# Patient Record
Sex: FEMALE | Race: WHITE | NOT HISPANIC OR LATINO
[De-identification: names, ages, dates, MRNs, and addresses within clinical notes are randomized per-mention and may not be internally consistent; named-entity substitution may affect disease eponyms.]

---

## 2017-01-23 ENCOUNTER — APPOINTMENT (OUTPATIENT)
Age: 52
End: 2017-01-23

## 2017-01-23 VITALS
BODY MASS INDEX: 19.67 KG/M2 | DIASTOLIC BLOOD PRESSURE: 73 MMHG | RESPIRATION RATE: 14 BRPM | HEIGHT: 63 IN | SYSTOLIC BLOOD PRESSURE: 118 MMHG | WEIGHT: 111 LBS | HEART RATE: 90 BPM | TEMPERATURE: 98.6 F

## 2017-01-23 DIAGNOSIS — K76.89 OTHER SPECIFIED DISEASES OF LIVER: ICD-10-CM

## 2017-01-23 LAB
BASOPHILS # BLD AUTO: 0.03 K/UL
BASOPHILS NFR BLD AUTO: 0.4 %
EOSINOPHIL # BLD AUTO: 0.02 K/UL
EOSINOPHIL NFR BLD AUTO: 0.3 %
HCT VFR BLD CALC: 40.6 %
HGB BLD-MCNC: 13.3 G/DL
IMM GRANULOCYTES NFR BLD AUTO: 0.3 %
LYMPHOCYTES # BLD AUTO: 1.38 K/UL
LYMPHOCYTES NFR BLD AUTO: 18.9 %
MAN DIFF?: NORMAL
MCHC RBC-ENTMCNC: 30.2 PG
MCHC RBC-ENTMCNC: 32.8 GM/DL
MCV RBC AUTO: 92.1 FL
MONOCYTES # BLD AUTO: 0.41 K/UL
MONOCYTES NFR BLD AUTO: 5.6 %
NEUTROPHILS # BLD AUTO: 5.43 K/UL
NEUTROPHILS NFR BLD AUTO: 74.5 %
PLATELET # BLD AUTO: 181 K/UL
RBC # BLD: 4.41 M/UL
RBC # FLD: 12.4 %
WBC # FLD AUTO: 7.29 K/UL

## 2017-01-24 LAB
ALBUMIN SERPL ELPH-MCNC: 4.3 G/DL
ALP BLD-CCNC: 49 U/L
ALT SERPL-CCNC: 5 U/L
ANION GAP SERPL CALC-SCNC: 13 MMOL/L
AST SERPL-CCNC: 14 U/L
BILIRUB SERPL-MCNC: 0.4 MG/DL
BUN SERPL-MCNC: 13 MG/DL
CALCIUM SERPL-MCNC: 8.9 MG/DL
CHLORIDE SERPL-SCNC: 102 MMOL/L
CO2 SERPL-SCNC: 26 MMOL/L
CREAT SERPL-MCNC: 0.63 MG/DL
GLUCOSE SERPL-MCNC: 87 MG/DL
HAV IGG+IGM SER QL: NONREACTIVE
HBV CORE IGG+IGM SER QL: NONREACTIVE
HBV SURFACE AB SER QL: NONREACTIVE
HBV SURFACE AG SER QL: NONREACTIVE
HCV AB SER QL: NONREACTIVE
HCV S/CO RATIO: 0.17 S/CO
POTASSIUM SERPL-SCNC: 3.7 MMOL/L
PROT SERPL-MCNC: 7 G/DL
SODIUM SERPL-SCNC: 141 MMOL/L

## 2017-01-25 ENCOUNTER — LABORATORY RESULT (OUTPATIENT)
Age: 52
End: 2017-01-25

## 2017-01-25 ENCOUNTER — APPOINTMENT (OUTPATIENT)
Dept: ENDOCRINOLOGY | Facility: CLINIC | Age: 52
End: 2017-01-25

## 2017-01-25 VITALS
BODY MASS INDEX: 18.61 KG/M2 | OXYGEN SATURATION: 98 % | SYSTOLIC BLOOD PRESSURE: 110 MMHG | HEIGHT: 63 IN | WEIGHT: 105 LBS | DIASTOLIC BLOOD PRESSURE: 60 MMHG | HEART RATE: 61 BPM

## 2017-01-26 LAB
T3RU NFR SERPL: 1.06 INDEX
T4 SERPL-MCNC: 6.7 UG/DL
TSH SERPL-ACNC: 0.82 UIU/ML

## 2017-01-27 LAB
ALBUMIN SERPL ELPH-MCNC: 4.5 G/DL
ALP BLD-CCNC: 57 U/L
ALT SERPL-CCNC: 8 U/L
ANION GAP SERPL CALC-SCNC: 18 MMOL/L
AST SERPL-CCNC: 16 U/L
BILIRUB SERPL-MCNC: 0.4 MG/DL
BUN SERPL-MCNC: 10 MG/DL
CALCIUM SERPL-MCNC: 9.6 MG/DL
CHLORIDE SERPL-SCNC: 103 MMOL/L
CO2 SERPL-SCNC: 24 MMOL/L
CREAT SERPL-MCNC: 0.72 MG/DL
GLUCOSE SERPL-MCNC: 82 MG/DL
POTASSIUM SERPL-SCNC: 4.2 MMOL/L
PROT SERPL-MCNC: 7.1 G/DL
SODIUM SERPL-SCNC: 145 MMOL/L

## 2017-02-27 ENCOUNTER — APPOINTMENT (OUTPATIENT)
Dept: GASTROENTEROLOGY | Facility: CLINIC | Age: 52
End: 2017-02-27

## 2017-02-27 VITALS
RESPIRATION RATE: 12 BRPM | DIASTOLIC BLOOD PRESSURE: 60 MMHG | HEIGHT: 63 IN | SYSTOLIC BLOOD PRESSURE: 127 MMHG | TEMPERATURE: 83 F | WEIGHT: 105 LBS | BODY MASS INDEX: 18.61 KG/M2

## 2017-03-02 ENCOUNTER — APPOINTMENT (OUTPATIENT)
Dept: GASTROENTEROLOGY | Facility: CLINIC | Age: 52
End: 2017-03-02

## 2017-03-02 VITALS
BODY MASS INDEX: 18.15 KG/M2 | DIASTOLIC BLOOD PRESSURE: 61 MMHG | HEART RATE: 58 BPM | HEIGHT: 63.75 IN | SYSTOLIC BLOOD PRESSURE: 98 MMHG | WEIGHT: 105 LBS

## 2017-03-27 ENCOUNTER — NON-APPOINTMENT (OUTPATIENT)
Age: 52
End: 2017-03-27

## 2017-03-27 ENCOUNTER — APPOINTMENT (OUTPATIENT)
Dept: CARDIOLOGY | Facility: CLINIC | Age: 52
End: 2017-03-27

## 2017-03-27 VITALS
WEIGHT: 105 LBS | BODY MASS INDEX: 18.61 KG/M2 | SYSTOLIC BLOOD PRESSURE: 111 MMHG | HEIGHT: 63 IN | DIASTOLIC BLOOD PRESSURE: 69 MMHG

## 2017-03-27 LAB
25(OH)D3 SERPL-MCNC: 40.6 NG/ML
ALBUMIN SERPL ELPH-MCNC: 4.5 G/DL
ALP BLD-CCNC: 59 U/L
ALT SERPL-CCNC: 11 U/L
ANION GAP SERPL CALC-SCNC: 23 MMOL/L
AST SERPL-CCNC: 19 U/L
BASOPHILS # BLD AUTO: 0.02 K/UL
BASOPHILS NFR BLD AUTO: 0.3 %
BILIRUB SERPL-MCNC: 0.5 MG/DL
BUN SERPL-MCNC: 12 MG/DL
CALCIUM SERPL-MCNC: 9.2 MG/DL
CHLORIDE SERPL-SCNC: 101 MMOL/L
CHOLEST SERPL-MCNC: 196 MG/DL
CHOLEST/HDLC SERPL: 2.6 RATIO
CO2 SERPL-SCNC: 21 MMOL/L
CREAT SERPL-MCNC: 0.71 MG/DL
EOSINOPHIL # BLD AUTO: 0.28 K/UL
EOSINOPHIL NFR BLD AUTO: 3.8 %
GLUCOSE SERPL-MCNC: 88 MG/DL
HBA1C MFR BLD HPLC: 5 %
HCT VFR BLD CALC: 34.6 %
HDLC SERPL-MCNC: 74 MG/DL
HGB BLD-MCNC: 11.5 G/DL
IMM GRANULOCYTES NFR BLD AUTO: 0.3 %
IRON SATN MFR SERPL: 41 %
IRON SERPL-MCNC: 104 UG/DL
LDLC SERPL CALC-MCNC: 110 MG/DL
LYMPHOCYTES # BLD AUTO: 1.99 K/UL
LYMPHOCYTES NFR BLD AUTO: 27.3 %
MAN DIFF?: NORMAL
MCHC RBC-ENTMCNC: 30.5 PG
MCHC RBC-ENTMCNC: 33.2 GM/DL
MCV RBC AUTO: 91.8 FL
MONOCYTES # BLD AUTO: 0.59 K/UL
MONOCYTES NFR BLD AUTO: 8.1 %
NEUTROPHILS # BLD AUTO: 4.4 K/UL
NEUTROPHILS NFR BLD AUTO: 60.2 %
PLATELET # BLD AUTO: 164 K/UL
POTASSIUM SERPL-SCNC: 3.7 MMOL/L
PROT SERPL-MCNC: 7.3 G/DL
RBC # BLD: 3.77 M/UL
RBC # FLD: 16.1 %
SODIUM SERPL-SCNC: 145 MMOL/L
TIBC SERPL-MCNC: 251 UG/DL
TRIGL SERPL-MCNC: 58 MG/DL
TSH SERPL-ACNC: 0.89 UIU/ML
UIBC SERPL-MCNC: 147 UG/DL
VIT B12 SERPL-MCNC: 567 PG/ML
WBC # FLD AUTO: 7.3 K/UL

## 2017-03-27 RX ORDER — ASCORBIC ACID 500 MG
TABLET ORAL
Refills: 0 | Status: DISCONTINUED | COMMUNITY
End: 2017-03-27

## 2017-03-27 RX ORDER — IBUPROFEN 200 MG
600 CAPSULE ORAL
Refills: 0 | Status: DISCONTINUED | COMMUNITY
End: 2017-03-27

## 2017-03-28 LAB — 24R-OH-CALCIDIOL SERPL-MCNC: 65.5 PG/ML

## 2017-03-30 LAB
B HENSELAE IGG SER-ACNC: NORMAL TITER
B HENSELAE IGM SER QL: NORMAL TITER
B QUINTANA IGG SER QL: NORMAL TITER
B QUINTANA IGM SER QL: NORMAL TITER

## 2017-04-09 ENCOUNTER — RESULT REVIEW (OUTPATIENT)
Age: 52
End: 2017-04-09

## 2017-04-10 ENCOUNTER — APPOINTMENT (OUTPATIENT)
Dept: OBGYN | Facility: CLINIC | Age: 52
End: 2017-04-10

## 2017-04-28 ENCOUNTER — APPOINTMENT (OUTPATIENT)
Age: 52
End: 2017-04-28

## 2017-05-05 ENCOUNTER — APPOINTMENT (OUTPATIENT)
Dept: GASTROENTEROLOGY | Facility: GI CENTER | Age: 52
End: 2017-05-05

## 2017-05-05 ENCOUNTER — OUTPATIENT (OUTPATIENT)
Dept: OUTPATIENT SERVICES | Facility: HOSPITAL | Age: 52
LOS: 1 days | End: 2017-05-05
Payer: COMMERCIAL

## 2017-05-05 DIAGNOSIS — Z83.71 FAMILY HISTORY OF COLONIC POLYPS: ICD-10-CM

## 2017-05-05 DIAGNOSIS — K62.5 HEMORRHAGE OF ANUS AND RECTUM: ICD-10-CM

## 2017-05-05 DIAGNOSIS — K60.2 ANAL FISSURE, UNSPECIFIED: ICD-10-CM

## 2017-05-05 PROCEDURE — 45378 DIAGNOSTIC COLONOSCOPY: CPT

## 2017-06-09 ENCOUNTER — APPOINTMENT (OUTPATIENT)
Dept: OTOLARYNGOLOGY | Facility: CLINIC | Age: 52
End: 2017-06-09

## 2017-06-09 VITALS
BODY MASS INDEX: 18.61 KG/M2 | DIASTOLIC BLOOD PRESSURE: 60 MMHG | OXYGEN SATURATION: 98 % | WEIGHT: 105 LBS | SYSTOLIC BLOOD PRESSURE: 97 MMHG | TEMPERATURE: 98.5 F | HEART RATE: 60 BPM | RESPIRATION RATE: 12 BRPM | HEIGHT: 63 IN

## 2017-06-09 RX ORDER — HYDROCORTISONE 25 MG/G
2.5 CREAM TOPICAL
Qty: 1 | Refills: 3 | Status: DISCONTINUED | COMMUNITY
Start: 2017-03-06 | End: 2017-06-09

## 2017-06-09 RX ORDER — LORATADINE 5 MG
17 TABLET,CHEWABLE ORAL
Refills: 0 | Status: DISCONTINUED | COMMUNITY
Start: 2017-03-02 | End: 2017-06-09

## 2017-06-09 RX ORDER — HYDROCORTISONE 25 MG/G
2.5 CREAM TOPICAL
Qty: 1 | Refills: 3 | Status: DISCONTINUED | COMMUNITY
Start: 2017-03-02 | End: 2017-06-09

## 2017-06-09 RX ORDER — HYDROCORTISONE 25 MG/G
2.5 CREAM TOPICAL
Qty: 28 | Refills: 0 | Status: DISCONTINUED | COMMUNITY
Start: 2017-03-06

## 2017-09-06 ENCOUNTER — RESULT REVIEW (OUTPATIENT)
Age: 52
End: 2017-09-06

## 2017-09-07 ENCOUNTER — APPOINTMENT (OUTPATIENT)
Dept: OBGYN | Facility: CLINIC | Age: 52
End: 2017-09-07
Payer: COMMERCIAL

## 2017-09-07 PROCEDURE — 99214 OFFICE O/P EST MOD 30 MIN: CPT

## 2017-09-14 ENCOUNTER — APPOINTMENT (OUTPATIENT)
Dept: DERMATOLOGY | Facility: CLINIC | Age: 52
End: 2017-09-14

## 2017-09-14 ENCOUNTER — APPOINTMENT (OUTPATIENT)
Dept: OBGYN | Facility: CLINIC | Age: 52
End: 2017-09-14
Payer: COMMERCIAL

## 2017-09-14 ENCOUNTER — APPOINTMENT (OUTPATIENT)
Dept: OPHTHALMOLOGY | Facility: CLINIC | Age: 52
End: 2017-09-14
Payer: COMMERCIAL

## 2017-09-14 PROCEDURE — 92004 COMPRE OPH EXAM NEW PT 1/>: CPT

## 2017-09-14 PROCEDURE — 99214 OFFICE O/P EST MOD 30 MIN: CPT

## 2017-10-05 ENCOUNTER — APPOINTMENT (OUTPATIENT)
Dept: DERMATOLOGY | Facility: CLINIC | Age: 52
End: 2017-10-05
Payer: COMMERCIAL

## 2017-10-05 ENCOUNTER — RESULT REVIEW (OUTPATIENT)
Age: 52
End: 2017-10-05

## 2017-10-05 PROCEDURE — 99214 OFFICE O/P EST MOD 30 MIN: CPT | Mod: 25

## 2017-10-05 PROCEDURE — 11100 BX SKIN SUBCUTANEOUS&/MUCOUS MEMBRANE 1 LESION: CPT

## 2017-10-12 ENCOUNTER — APPOINTMENT (OUTPATIENT)
Dept: OBGYN | Facility: CLINIC | Age: 52
End: 2017-10-12
Payer: COMMERCIAL

## 2017-10-12 PROCEDURE — 99213 OFFICE O/P EST LOW 20 MIN: CPT

## 2018-01-18 ENCOUNTER — NON-APPOINTMENT (OUTPATIENT)
Age: 53
End: 2018-01-18

## 2018-01-18 ENCOUNTER — APPOINTMENT (OUTPATIENT)
Dept: CARDIOLOGY | Facility: CLINIC | Age: 53
End: 2018-01-18
Payer: COMMERCIAL

## 2018-01-18 VITALS
HEART RATE: 80 BPM | HEIGHT: 63 IN | DIASTOLIC BLOOD PRESSURE: 67 MMHG | SYSTOLIC BLOOD PRESSURE: 110 MMHG | OXYGEN SATURATION: 99 %

## 2018-01-18 PROCEDURE — 99214 OFFICE O/P EST MOD 30 MIN: CPT

## 2018-01-18 PROCEDURE — 93000 ELECTROCARDIOGRAM COMPLETE: CPT

## 2018-01-19 LAB
25(OH)D3 SERPL-MCNC: 42.1 NG/ML
ALBUMIN SERPL ELPH-MCNC: 4.6 G/DL
ALP BLD-CCNC: 63 U/L
ALT SERPL-CCNC: 9 U/L
AMYLASE/CREAT SERPL: 87 U/L
ANION GAP SERPL CALC-SCNC: 11 MMOL/L
APPEARANCE: CLEAR
AST SERPL-CCNC: 19 U/L
BACTERIA: NEGATIVE
BASOPHILS # BLD AUTO: 0.03 K/UL
BASOPHILS NFR BLD AUTO: 0.5 %
BILIRUB SERPL-MCNC: 0.4 MG/DL
BILIRUBIN URINE: NEGATIVE
BLOOD URINE: NEGATIVE
BUN SERPL-MCNC: 11 MG/DL
CALCIUM OXALATE CRYSTALS: ABNORMAL
CALCIUM SERPL-MCNC: 9.7 MG/DL
CHLORIDE SERPL-SCNC: 102 MMOL/L
CHOLEST SERPL-MCNC: 192 MG/DL
CHOLEST/HDLC SERPL: 2.6 RATIO
CO2 SERPL-SCNC: 29 MMOL/L
COLOR: YELLOW
CREAT SERPL-MCNC: 0.76 MG/DL
EOSINOPHIL # BLD AUTO: 0.04 K/UL
EOSINOPHIL NFR BLD AUTO: 0.7 %
FERRITIN SERPL-MCNC: 75 NG/ML
FSH SERPL-MCNC: 84.2 IU/L
GLUCOSE QUALITATIVE U: NEGATIVE MG/DL
GLUCOSE SERPL-MCNC: 90 MG/DL
HBA1C MFR BLD HPLC: 4.7 %
HCT VFR BLD CALC: 39.9 %
HDLC SERPL-MCNC: 74 MG/DL
HGB BLD-MCNC: 13.2 G/DL
HYALINE CASTS: 3 /LPF
IMM GRANULOCYTES NFR BLD AUTO: 0.2 %
IRON SATN MFR SERPL: 24 %
IRON SERPL-MCNC: 60 UG/DL
KETONES URINE: ABNORMAL
LDLC SERPL CALC-MCNC: 107 MG/DL
LEUKOCYTE ESTERASE URINE: NEGATIVE
LH SERPL-ACNC: 33 IU/L
LPL SERPL-CCNC: 35 U/L
LYMPHOCYTES # BLD AUTO: 1.73 K/UL
LYMPHOCYTES NFR BLD AUTO: 31.4 %
MAN DIFF?: NORMAL
MCHC RBC-ENTMCNC: 30.2 PG
MCHC RBC-ENTMCNC: 33.1 GM/DL
MCV RBC AUTO: 91.3 FL
MICROSCOPIC-UA: NORMAL
MONOCYTES # BLD AUTO: 0.4 K/UL
MONOCYTES NFR BLD AUTO: 7.3 %
NEUTROPHILS # BLD AUTO: 3.3 K/UL
NEUTROPHILS NFR BLD AUTO: 59.9 %
NITRITE URINE: NEGATIVE
PH URINE: 7
PLATELET # BLD AUTO: 180 K/UL
POTASSIUM SERPL-SCNC: 4.3 MMOL/L
PROT SERPL-MCNC: 7.4 G/DL
PROTEIN URINE: ABNORMAL MG/DL
RBC # BLD: 4.37 M/UL
RBC # FLD: 12.2 %
RED BLOOD CELLS URINE: 2 /HPF
SODIUM SERPL-SCNC: 142 MMOL/L
SPECIFIC GRAVITY URINE: 1.02
SQUAMOUS EPITHELIAL CELLS: 3 /HPF
T3 SERPL-MCNC: 110 NG/DL
T4 SERPL-MCNC: 6.5 UG/DL
TIBC SERPL-MCNC: 246 UG/DL
TRIGL SERPL-MCNC: 53 MG/DL
TSH SERPL-ACNC: 0.71 UIU/ML
UIBC SERPL-MCNC: 186 UG/DL
UROBILINOGEN URINE: 1 MG/DL
VIT B12 SERPL-MCNC: 569 PG/ML
WBC # FLD AUTO: 5.51 K/UL
WHITE BLOOD CELLS URINE: 4 /HPF

## 2018-01-22 LAB
B HENSELAE IGG SER-ACNC: NORMAL TITER
B HENSELAE IGM SER QL: NORMAL TITER
B QUINTANA IGG SER QL: NORMAL TITER
B QUINTANA IGM SER QL: NORMAL TITER
BACTERIA UR CULT: NORMAL
FOLATE RBC-MCNC: 1305 NG/ML
FOLATE SERPL-MCNC: >20 NG/ML
HCT VFR BLD CALC: 40 %

## 2018-01-26 ENCOUNTER — APPOINTMENT (OUTPATIENT)
Dept: CV DIAGNOSTICS | Facility: HOSPITAL | Age: 53
End: 2018-01-26

## 2018-01-26 ENCOUNTER — OUTPATIENT (OUTPATIENT)
Dept: OUTPATIENT SERVICES | Facility: HOSPITAL | Age: 53
LOS: 1 days | End: 2018-01-26
Payer: COMMERCIAL

## 2018-01-26 DIAGNOSIS — R07.9 CHEST PAIN, UNSPECIFIED: ICD-10-CM

## 2018-01-26 PROCEDURE — 93017 CV STRESS TEST TRACING ONLY: CPT

## 2018-01-26 PROCEDURE — 93018 CV STRESS TEST I&R ONLY: CPT

## 2018-01-26 PROCEDURE — 93016 CV STRESS TEST SUPVJ ONLY: CPT

## 2018-01-29 LAB
APPEARANCE: ABNORMAL
BACTERIA UR CULT: NORMAL
BACTERIA: NEGATIVE
BILIRUBIN URINE: NEGATIVE
BLOOD URINE: NEGATIVE
COLOR: YELLOW
GLUCOSE QUALITATIVE U: NEGATIVE MG/DL
HYALINE CASTS: 1 /LPF
KETONES URINE: NEGATIVE
LEUKOCYTE ESTERASE URINE: NEGATIVE
MICROSCOPIC-UA: NORMAL
NITRITE URINE: NEGATIVE
PH URINE: 7.5
PROTEIN URINE: NEGATIVE MG/DL
RED BLOOD CELLS URINE: 3 /HPF
SPECIFIC GRAVITY URINE: 1.02
SQUAMOUS EPITHELIAL CELLS: 4 /HPF
UROBILINOGEN URINE: NEGATIVE MG/DL
WHITE BLOOD CELLS URINE: 5 /HPF

## 2018-01-31 ENCOUNTER — TRANSCRIPTION ENCOUNTER (OUTPATIENT)
Age: 53
End: 2018-01-31

## 2018-03-30 ENCOUNTER — APPOINTMENT (OUTPATIENT)
Dept: FAMILY MEDICINE | Facility: CLINIC | Age: 53
End: 2018-03-30
Payer: COMMERCIAL

## 2018-03-30 VITALS
SYSTOLIC BLOOD PRESSURE: 120 MMHG | HEIGHT: 63 IN | HEART RATE: 92 BPM | TEMPERATURE: 99.2 F | BODY MASS INDEX: 18.61 KG/M2 | RESPIRATION RATE: 16 BRPM | DIASTOLIC BLOOD PRESSURE: 70 MMHG | OXYGEN SATURATION: 98 % | WEIGHT: 105 LBS

## 2018-03-30 LAB
FLUAV SPEC QL CULT: NEGATIVE
FLUBV AG SPEC QL IA: NEGATIVE
S PYO AG SPEC QL IA: NEGATIVE

## 2018-03-30 PROCEDURE — 99203 OFFICE O/P NEW LOW 30 MIN: CPT | Mod: 25

## 2018-03-30 PROCEDURE — 87804 INFLUENZA ASSAY W/OPTIC: CPT | Mod: QW

## 2018-03-30 PROCEDURE — 87880 STREP A ASSAY W/OPTIC: CPT | Mod: QW

## 2018-05-14 ENCOUNTER — TRANSCRIPTION ENCOUNTER (OUTPATIENT)
Age: 53
End: 2018-05-14

## 2018-05-16 ENCOUNTER — OTHER (OUTPATIENT)
Age: 53
End: 2018-05-16

## 2018-06-04 ENCOUNTER — APPOINTMENT (OUTPATIENT)
Dept: OPHTHALMOLOGY | Facility: CLINIC | Age: 53
End: 2018-06-04

## 2018-06-04 ENCOUNTER — APPOINTMENT (OUTPATIENT)
Dept: OPHTHALMOLOGY | Facility: CLINIC | Age: 53
End: 2018-06-04
Payer: COMMERCIAL

## 2018-06-04 PROCEDURE — 92012 INTRM OPH EXAM EST PATIENT: CPT

## 2018-09-06 ENCOUNTER — RESULT REVIEW (OUTPATIENT)
Age: 53
End: 2018-09-06

## 2018-09-07 ENCOUNTER — APPOINTMENT (OUTPATIENT)
Dept: DERMATOLOGY | Facility: CLINIC | Age: 53
End: 2018-09-07
Payer: COMMERCIAL

## 2018-09-07 PROCEDURE — 99213 OFFICE O/P EST LOW 20 MIN: CPT | Mod: 25

## 2018-09-07 PROCEDURE — 11100 BX SKIN SUBCUTANEOUS&/MUCOUS MEMBRANE 1 LESION: CPT

## 2018-09-10 ENCOUNTER — RESULT REVIEW (OUTPATIENT)
Age: 53
End: 2018-09-10

## 2018-09-10 ENCOUNTER — APPOINTMENT (OUTPATIENT)
Dept: OBGYN | Facility: CLINIC | Age: 53
End: 2018-09-10
Payer: COMMERCIAL

## 2018-09-10 PROCEDURE — 99396 PREV VISIT EST AGE 40-64: CPT

## 2018-09-10 PROCEDURE — 36415 COLL VENOUS BLD VENIPUNCTURE: CPT

## 2018-10-15 ENCOUNTER — APPOINTMENT (OUTPATIENT)
Dept: FAMILY MEDICINE | Facility: CLINIC | Age: 53
End: 2018-10-15
Payer: COMMERCIAL

## 2018-10-15 VITALS
DIASTOLIC BLOOD PRESSURE: 60 MMHG | BODY MASS INDEX: 18.25 KG/M2 | HEIGHT: 63 IN | WEIGHT: 103 LBS | HEART RATE: 61 BPM | RESPIRATION RATE: 16 BRPM | OXYGEN SATURATION: 98 % | TEMPERATURE: 99 F | SYSTOLIC BLOOD PRESSURE: 100 MMHG

## 2018-10-15 DIAGNOSIS — Z87.2 PERSONAL HISTORY OF DISEASES OF THE SKIN AND SUBCUTANEOUS TISSUE: ICD-10-CM

## 2018-10-15 DIAGNOSIS — Z87.898 PERSONAL HISTORY OF OTHER SPECIFIED CONDITIONS: ICD-10-CM

## 2018-10-15 DIAGNOSIS — K13.0 DISEASES OF LIPS: ICD-10-CM

## 2018-10-15 DIAGNOSIS — J39.2 OTHER DISEASES OF PHARYNX: ICD-10-CM

## 2018-10-15 DIAGNOSIS — Z87.09 PERSONAL HISTORY OF OTHER DISEASES OF THE RESPIRATORY SYSTEM: ICD-10-CM

## 2018-10-15 DIAGNOSIS — H11.442 CONJUNCTIVAL CYSTS, LEFT EYE: ICD-10-CM

## 2018-10-15 DIAGNOSIS — Z87.19 PERSONAL HISTORY OF OTHER DISEASES OF THE DIGESTIVE SYSTEM: ICD-10-CM

## 2018-10-15 DIAGNOSIS — R68.89 OTHER GENERAL SYMPTOMS AND SIGNS: ICD-10-CM

## 2018-10-15 DIAGNOSIS — K12.0 RECURRENT ORAL APHTHAE: ICD-10-CM

## 2018-10-15 LAB — CYTOLOGY CVX/VAG DOC THIN PREP: NORMAL

## 2018-10-15 PROCEDURE — 99386 PREV VISIT NEW AGE 40-64: CPT

## 2018-10-15 PROCEDURE — 99396 PREV VISIT EST AGE 40-64: CPT

## 2018-10-15 RX ORDER — CHLORHEXIDINE GLUCONATE 4 %
400 LIQUID (ML) TOPICAL
Refills: 0 | Status: DISCONTINUED | COMMUNITY
End: 2018-10-15

## 2018-10-15 RX ORDER — AZELASTINE HYDROCHLORIDE 137 UG/1
0.1 SPRAY, METERED NASAL TWICE DAILY
Qty: 1 | Refills: 0 | Status: DISCONTINUED | COMMUNITY
Start: 2018-03-31 | End: 2018-10-15

## 2018-10-15 RX ORDER — AZELASTINE HYDROCHLORIDE 205.5 UG/1
0.15 SPRAY, METERED NASAL TWICE DAILY
Qty: 1 | Refills: 1 | Status: DISCONTINUED | COMMUNITY
Start: 2018-03-30 | End: 2018-10-15

## 2018-10-15 RX ORDER — AZITHROMYCIN 250 MG/1
250 TABLET, FILM COATED ORAL
Qty: 1 | Refills: 0 | Status: DISCONTINUED | COMMUNITY
Start: 2018-03-30 | End: 2018-10-15

## 2018-10-16 ENCOUNTER — TRANSCRIPTION ENCOUNTER (OUTPATIENT)
Age: 53
End: 2018-10-16

## 2018-10-16 PROBLEM — K12.0 CANKER SORE: Status: RESOLVED | Noted: 2018-03-31 | Resolved: 2018-10-16

## 2018-10-16 PROBLEM — R68.89 FLU-LIKE SYMPTOMS: Status: RESOLVED | Noted: 2018-03-30 | Resolved: 2018-10-16

## 2018-10-16 PROBLEM — Z87.19 HISTORY OF RECTAL BLEEDING: Status: RESOLVED | Noted: 2017-03-02 | Resolved: 2018-10-16

## 2018-10-16 PROBLEM — Z87.09 HISTORY OF UPPER RESPIRATORY INFECTION: Status: RESOLVED | Noted: 2018-03-30 | Resolved: 2018-10-16

## 2018-10-16 PROBLEM — K13.0 LIP LESION: Status: RESOLVED | Noted: 2017-06-09 | Resolved: 2018-10-16

## 2018-10-16 PROBLEM — H11.442 CONJUNCTIVAL CYST OF LEFT EYE: Status: RESOLVED | Noted: 2017-09-14 | Resolved: 2018-10-16

## 2018-10-16 PROBLEM — Z87.09 HISTORY OF PHARYNGITIS: Status: RESOLVED | Noted: 2018-03-30 | Resolved: 2018-10-16

## 2018-10-16 NOTE — HEALTH RISK ASSESSMENT
[Good] : ~his/her~  mood as  good [No falls in past year] : Patient reported no falls in the past year [0] : 2) Feeling down, depressed, or hopeless: Not at all (0) [Patient reported mammogram was normal] : Patient reported mammogram was normal [Patient reported PAP Smear was normal] : Patient reported PAP Smear was normal [Patient reported colonoscopy was normal] : Patient reported colonoscopy was normal [None] : None [Employed] : employed [Graduate School] : graduate school [Feels Safe at Home] : Feels safe at home [Fully functional (bathing, dressing, toileting, transferring, walking, feeding)] : Fully functional (bathing, dressing, toileting, transferring, walking, feeding) [Fully functional (using the telephone, shopping, preparing meals, housekeeping, doing laundry, using] : Fully functional and needs no help or supervision to perform IADLs (using the telephone, shopping, preparing meals, housekeeping, doing laundry, using transportation, managing medications and managing finances) [Smoke Detector] : smoke detector [Carbon Monoxide Detector] : carbon monoxide detector [Seat Belt] :  uses seat belt [] : No [[id number]] : 0 [Change in mental status noted] : No change in mental status noted [Language] : denies difficulty with language [Reasoning] : denies difficulty with reasoning [Reports changes in hearing] : Reports no changes in hearing [Reports changes in vision] : Reports no changes in vision [Reports changes in dental health] : Reports no changes in dental health [MammogramDate] : 07/18 [MammogramComments] : Breast MRI done - normal [PapSmearDate] : 9/10/18 [ColonoscopyDate] : 5/5/17 [ColonoscopyComments] : return in 5 years [FreeTextEntry2] :

## 2018-10-16 NOTE — ASSESSMENT
[FreeTextEntry1] : Health maintenance\par - will do screening labs in January when due for yearly\par - up to date with pap\par - does not get mammograms, does breast MRI imaging which was done 7/2018 and normal\par - colonoscopy done in May, recommended to follow up in 5 years\par - declined flu shot at this time, will consider\par - up to date with tetanus \par - EKG done in January\par - had recent skin check with dermatology and a benign lesion removed\par \par Multiple thyroid nodules\par - follows up yearly with endocrine \par - is going for US\par - has had multiple biopsies done

## 2018-10-16 NOTE — HISTORY OF PRESENT ILLNESS
[FreeTextEntry1] : cpe [de-identified] : Patient is feeling well today.  No major complaints.  She did recently get over a cold/ sore throat but she is feeling much better now.  She had a full panel of labs done in January so she will return then to do blood work.

## 2018-10-16 NOTE — PHYSICAL EXAM
[No Acute Distress] : no acute distress [Well Nourished] : well nourished [Well Developed] : well developed [Well-Appearing] : well-appearing [Normal Sclera/Conjunctiva] : normal sclera/conjunctiva [PERRL] : pupils equal round and reactive to light [EOMI] : extraocular movements intact [Normal Outer Ear/Nose] : the outer ears and nose were normal in appearance [Normal Oropharynx] : the oropharynx was normal [Normal TMs] : both tympanic membranes were normal [Supple] : supple [No Lymphadenopathy] : no lymphadenopathy [No Respiratory Distress] : no respiratory distress  [Clear to Auscultation] : lungs were clear to auscultation bilaterally [No Accessory Muscle Use] : no accessory muscle use [Normal Rate] : normal rate  [Regular Rhythm] : with a regular rhythm [Normal S1, S2] : normal S1 and S2 [No Murmur] : no murmur heard [Pedal Pulses Present] : the pedal pulses are present [No Edema] : there was no peripheral edema [Soft] : abdomen soft [Non Tender] : non-tender [Non-distended] : non-distended [No Masses] : no abdominal mass palpated [No HSM] : no HSM [Normal Bowel Sounds] : normal bowel sounds [Normal Posterior Cervical Nodes] : no posterior cervical lymphadenopathy [Normal Anterior Cervical Nodes] : no anterior cervical lymphadenopathy [No CVA Tenderness] : no CVA  tenderness [No Joint Swelling] : no joint swelling [Grossly Normal Strength/Tone] : grossly normal strength/tone [No Rash] : no rash [Normal Gait] : normal gait [No Focal Deficits] : no focal deficits [Normal Affect] : the affect was normal [Normal Insight/Judgement] : insight and judgment were intact [Alert and Oriented x3] : oriented to person, place, and time

## 2018-12-18 ENCOUNTER — OTHER (OUTPATIENT)
Age: 53
End: 2018-12-18

## 2018-12-21 ENCOUNTER — TRANSCRIPTION ENCOUNTER (OUTPATIENT)
Age: 53
End: 2018-12-21

## 2018-12-24 ENCOUNTER — TRANSCRIPTION ENCOUNTER (OUTPATIENT)
Age: 53
End: 2018-12-24

## 2018-12-31 ENCOUNTER — TRANSCRIPTION ENCOUNTER (OUTPATIENT)
Age: 53
End: 2018-12-31

## 2019-01-02 ENCOUNTER — MOBILE ON CALL (OUTPATIENT)
Age: 54
End: 2019-01-02

## 2019-01-03 ENCOUNTER — TRANSCRIPTION ENCOUNTER (OUTPATIENT)
Age: 54
End: 2019-01-03

## 2019-01-14 ENCOUNTER — APPOINTMENT (OUTPATIENT)
Dept: OPHTHALMOLOGY | Facility: CLINIC | Age: 54
End: 2019-01-14
Payer: COMMERCIAL

## 2019-01-14 PROCEDURE — 92014 COMPRE OPH EXAM EST PT 1/>: CPT

## 2019-03-03 ENCOUNTER — TRANSCRIPTION ENCOUNTER (OUTPATIENT)
Age: 54
End: 2019-03-03

## 2019-03-04 ENCOUNTER — TRANSCRIPTION ENCOUNTER (OUTPATIENT)
Age: 54
End: 2019-03-04

## 2019-03-07 ENCOUNTER — RESULT REVIEW (OUTPATIENT)
Age: 54
End: 2019-03-07

## 2019-03-08 ENCOUNTER — APPOINTMENT (OUTPATIENT)
Dept: DERMATOLOGY | Facility: CLINIC | Age: 54
End: 2019-03-08
Payer: COMMERCIAL

## 2019-03-08 PROCEDURE — 17110 DESTRUCTION B9 LES UP TO 14: CPT

## 2019-03-08 PROCEDURE — 11102 TANGNTL BX SKIN SINGLE LES: CPT | Mod: 59

## 2019-03-08 PROCEDURE — 11103 TANGNTL BX SKIN EA SEP/ADDL: CPT

## 2019-03-08 PROCEDURE — 99214 OFFICE O/P EST MOD 30 MIN: CPT | Mod: 25

## 2019-04-01 ENCOUNTER — TRANSCRIPTION ENCOUNTER (OUTPATIENT)
Age: 54
End: 2019-04-01

## 2019-04-01 ENCOUNTER — LABORATORY RESULT (OUTPATIENT)
Age: 54
End: 2019-04-01

## 2019-04-02 ENCOUNTER — TRANSCRIPTION ENCOUNTER (OUTPATIENT)
Age: 54
End: 2019-04-02

## 2019-04-04 ENCOUNTER — TRANSCRIPTION ENCOUNTER (OUTPATIENT)
Age: 54
End: 2019-04-04

## 2019-04-04 LAB
25(OH)D3 SERPL-MCNC: 51.2 NG/ML
ALBUMIN SERPL ELPH-MCNC: 4.6 G/DL
ALP BLD-CCNC: 59 U/L
ALT SERPL-CCNC: 10 U/L
ALUMINUM SERPL-MCNC: 6 UG/L
AMYLASE/CREAT SERPL: 94 U/L
ANION GAP SERPL CALC-SCNC: 9 MMOL/L
APPEARANCE: ABNORMAL
AST SERPL-CCNC: 19 U/L
B HENSELAE IGG SER-ACNC: NORMAL TITER
B HENSELAE IGM SER QL: NORMAL TITER
B QUINTANA IGG SER QL: NORMAL TITER
B QUINTANA IGM SER QL: NORMAL TITER
BACTERIA UR CULT: NORMAL
BASOPHILS # BLD AUTO: 0.05 K/UL
BASOPHILS NFR BLD AUTO: 1 %
BILIRUB SERPL-MCNC: 0.4 MG/DL
BILIRUBIN URINE: NEGATIVE
BLOOD URINE: NEGATIVE
BUN SERPL-MCNC: 12 MG/DL
CALCIUM SERPL-MCNC: 9.2 MG/DL
CHLORIDE SERPL-SCNC: 101 MMOL/L
CHOLEST SERPL-MCNC: 193 MG/DL
CHOLEST/HDLC SERPL: 2.5 RATIO
CO2 SERPL-SCNC: 31 MMOL/L
COLOR: YELLOW
CORTIS SERPL-MCNC: 17.3 UG/DL
CREAT SERPL-MCNC: 0.76 MG/DL
CRP SERPL-MCNC: 0.12 MG/DL
EOSINOPHIL # BLD AUTO: 0.09 K/UL
EOSINOPHIL NFR BLD AUTO: 1.9 %
ERYTHROCYTE [SEDIMENTATION RATE] IN BLOOD BY WESTERGREN METHOD: 6 MM/HR
ESTRADIOL SERPL-MCNC: 54 PG/ML
FERRITIN SERPL-MCNC: 48 NG/ML
FOLATE SERPL-MCNC: >20 NG/ML
FSH SERPL-MCNC: 85.5 IU/L
GLUCOSE QUALITATIVE U: NEGATIVE
GLUCOSE SERPL-MCNC: 83 MG/DL
HBA1C MFR BLD HPLC: 4.7 %
HCT VFR BLD CALC: 39.9 %
HDLC SERPL-MCNC: 77 MG/DL
HGB BLD-MCNC: 13 G/DL
IMM GRANULOCYTES NFR BLD AUTO: 0 %
INSULIN SERPL-MCNC: 3.4 UU/ML
IRON SATN MFR SERPL: 27 %
IRON SERPL-MCNC: 81 UG/DL
KETONES URINE: NEGATIVE
LDLC SERPL CALC-MCNC: 104 MG/DL
LEUKOCYTE ESTERASE URINE: NEGATIVE
LH SERPL-ACNC: 70.6 IU/L
LPL SERPL-CCNC: 42 U/L
LYMPHOCYTES # BLD AUTO: 1.82 K/UL
LYMPHOCYTES NFR BLD AUTO: 37.4 %
MAN DIFF?: NORMAL
MCHC RBC-ENTMCNC: 30.4 PG
MCHC RBC-ENTMCNC: 32.6 GM/DL
MCV RBC AUTO: 93.4 FL
MERCURY BLD-MCNC: NORMAL UG/L
MONOCYTES # BLD AUTO: 0.37 K/UL
MONOCYTES NFR BLD AUTO: 7.6 %
NEUTROPHILS # BLD AUTO: 2.53 K/UL
NEUTROPHILS NFR BLD AUTO: 52.1 %
NITRITE URINE: NEGATIVE
PH URINE: 8
PLATELET # BLD AUTO: 180 K/UL
POTASSIUM SERPL-SCNC: 4 MMOL/L
PROGEST SERPL-MCNC: 0.3 NG/ML
PROT SERPL-MCNC: 6.8 G/DL
PROTEIN URINE: NORMAL
RBC # BLD: 4.27 M/UL
RBC # FLD: 12.2 %
SHBG SERPL-SCNC: 139 NMOL/L
SODIUM SERPL-SCNC: 141 MMOL/L
SPECIFIC GRAVITY URINE: 1.02
T3 SERPL-MCNC: 109 NG/DL
T3FREE SERPL-MCNC: 3.28 PG/ML
T3REVERSE SERPL-MCNC: 20.9 NG/DL
T4 FREE SERPL-MCNC: 1.1 NG/DL
T4 SERPL-MCNC: 6.8 UG/DL
TIBC SERPL-MCNC: 298 UG/DL
TRIGL SERPL-MCNC: 59 MG/DL
TSH SERPL-ACNC: 1.23 UIU/ML
UIBC SERPL-MCNC: 217 UG/DL
UROBILINOGEN URINE: NORMAL
VIT B12 SERPL-MCNC: 686 PG/ML
WBC # FLD AUTO: 4.86 K/UL

## 2019-04-05 ENCOUNTER — TRANSCRIPTION ENCOUNTER (OUTPATIENT)
Age: 54
End: 2019-04-05

## 2019-04-05 LAB
DHEA SERPL-MCNC: 936 NG/DL
TESTOST BND SERPL-MCNC: 1.6 PG/ML
TESTOST SERPL-MCNC: 57.7 NG/DL

## 2019-04-08 ENCOUNTER — TRANSCRIPTION ENCOUNTER (OUTPATIENT)
Age: 54
End: 2019-04-08

## 2019-04-09 ENCOUNTER — NON-APPOINTMENT (OUTPATIENT)
Age: 54
End: 2019-04-09

## 2019-04-09 ENCOUNTER — APPOINTMENT (OUTPATIENT)
Dept: CARDIOLOGY | Facility: CLINIC | Age: 54
End: 2019-04-09
Payer: COMMERCIAL

## 2019-04-09 ENCOUNTER — TRANSCRIPTION ENCOUNTER (OUTPATIENT)
Age: 54
End: 2019-04-09

## 2019-04-09 VITALS
WEIGHT: 104 LBS | HEART RATE: 64 BPM | HEIGHT: 63 IN | BODY MASS INDEX: 18.43 KG/M2 | SYSTOLIC BLOOD PRESSURE: 110 MMHG | OXYGEN SATURATION: 99 % | DIASTOLIC BLOOD PRESSURE: 64 MMHG

## 2019-04-09 PROCEDURE — 93000 ELECTROCARDIOGRAM COMPLETE: CPT

## 2019-04-09 PROCEDURE — 99214 OFFICE O/P EST MOD 30 MIN: CPT | Mod: 25

## 2019-04-09 NOTE — HISTORY OF PRESENT ILLNESS
[FreeTextEntry1] : Pt is a 55 y/o F who is referred here today by their PCP for evaluation.  Was diagnosed with MVP many yrs ago but on recent TTE's this was not seen.  Was following with multiple cardiologists: Dr Kristina Lauren then was following with Dr Melvin Banuelos and most recently Dr Shaina Gordon.  Previous ECG shows NSR with poor R wave progression.  She overall feels well - notes some "weird" chest sensation at times, not associated with exertion.  She denies CP, SOB, diaphoresis, palpitations, dizziness, syncope, LE edema, PND.   She remains active but does not exercise\par \par ETT 01/2018: no ischemic changes, exercise time 9min 5 sec\par TTE 03/2016 normal LV function without significant valvular disease\par \par 04/2019:\par A1c = 4.7\par TG 59\par Tchol 193\par HDL 77\par \par PMH: none\par Employed as a \par Smoking status: never\par No drug use or Etoh\par Current exercise: none\par Daily water intake: "not much"\par Daily caffeine intake: soda - few cups\par OTC medications: folic acid, vit D, probiotic, vit C, vit D\par Family hx: father CAD s/p PCI at age 85\par Previous cardiac testing: as mentioned\par Previous hospitalizations: breast augmentation, R knee surgery - no problems with anesthesia\par no children\par LMP 02/2019

## 2019-04-09 NOTE — DISCUSSION/SUMMARY
[FreeTextEntry1] : Pt is a 53 y/o F who presents today for cardiac evaluation.  She describes odd chest sensation at times.  Father recently had PCI at age 85.  Chol and A1c WNL\par ECG shows sinus arrhythmia, with low voltage\par Will check transthoracic echocardiogram to evaluate left ventricular function and assess for any structural abnormalities\par will perform ETT to assess patient's current cardiac reserve to incremental activity and check for provocable ECG changes.\par Will get results of previous CUS and consider repeating based on results\par VSS\par The described plan was discussed with the pt.  All questions and concerns were addressed to the best of my knowledge. \par

## 2019-04-09 NOTE — REVIEW OF SYSTEMS
[Negative] : Heme/Lymph [see HPI] : see HPI [Shortness Of Breath] : no shortness of breath [Chest  Pressure] : no chest pressure [Dyspnea on exertion] : not dyspnea during exertion [Leg Claudication] : no intermittent leg claudication [Chest Pain] : no chest pain [Lower Ext Edema] : no extremity edema [Palpitations] : no palpitations

## 2019-04-09 NOTE — PHYSICAL EXAM
[General Appearance - Well Developed] : well developed [Normal Appearance] : normal appearance [Well Groomed] : well groomed [General Appearance - Well Nourished] : well nourished [No Deformities] : no deformities [General Appearance - In No Acute Distress] : no acute distress [Normal Conjunctiva] : the conjunctiva exhibited no abnormalities [Eyelids - No Xanthelasma] : the eyelids demonstrated no xanthelasmas [Normal Oral Mucosa] : normal oral mucosa [No Oral Pallor] : no oral pallor [No Oral Cyanosis] : no oral cyanosis [Heart Rate And Rhythm] : heart rate and rhythm were normal [Heart Sounds] : normal S1 and S2 [Murmurs] : no murmurs present [Arterial Pulses Normal] : the arterial pulses were normal [Edema] : no peripheral edema present [Respiration, Rhythm And Depth] : normal respiratory rhythm and effort [Exaggerated Use Of Accessory Muscles For Inspiration] : no accessory muscle use [Auscultation Breath Sounds / Voice Sounds] : lungs were clear to auscultation bilaterally [Abdomen Soft] : soft [Abdomen Tenderness] : non-tender [Abdomen Mass (___ Cm)] : no abdominal mass palpated [Abnormal Walk] : normal gait [Gait - Sufficient For Exercise Testing] : the gait was sufficient for exercise testing [Nail Clubbing] : no clubbing of the fingernails [Cyanosis, Localized] : no localized cyanosis [Petechial Hemorrhages (___cm)] : no petechial hemorrhages [Oriented To Time, Place, And Person] : oriented to person, place, and time [] : no ischemic changes [Impaired Insight] : insight and judgment were intact [Mood] : the mood was normal [Affect] : the affect was normal [No Anxiety] : not feeling anxious [FreeTextEntry1] : no JVD or bruits

## 2019-04-11 ENCOUNTER — OTHER (OUTPATIENT)
Age: 54
End: 2019-04-11

## 2019-04-11 ENCOUNTER — TRANSCRIPTION ENCOUNTER (OUTPATIENT)
Age: 54
End: 2019-04-11

## 2019-04-11 ENCOUNTER — RESULT CHARGE (OUTPATIENT)
Age: 54
End: 2019-04-11

## 2019-04-16 ENCOUNTER — TRANSCRIPTION ENCOUNTER (OUTPATIENT)
Age: 54
End: 2019-04-16

## 2019-04-17 ENCOUNTER — TRANSCRIPTION ENCOUNTER (OUTPATIENT)
Age: 54
End: 2019-04-17

## 2019-04-25 ENCOUNTER — TRANSCRIPTION ENCOUNTER (OUTPATIENT)
Age: 54
End: 2019-04-25

## 2019-04-25 ENCOUNTER — APPOINTMENT (OUTPATIENT)
Dept: CARDIOLOGY | Facility: CLINIC | Age: 54
End: 2019-04-25
Payer: COMMERCIAL

## 2019-04-25 PROCEDURE — 93015 CV STRESS TEST SUPVJ I&R: CPT

## 2019-04-25 PROCEDURE — 93306 TTE W/DOPPLER COMPLETE: CPT

## 2019-04-29 ENCOUNTER — TRANSCRIPTION ENCOUNTER (OUTPATIENT)
Age: 54
End: 2019-04-29

## 2019-05-03 ENCOUNTER — TRANSCRIPTION ENCOUNTER (OUTPATIENT)
Age: 54
End: 2019-05-03

## 2019-05-07 ENCOUNTER — TRANSCRIPTION ENCOUNTER (OUTPATIENT)
Age: 54
End: 2019-05-07

## 2019-05-07 ENCOUNTER — MOBILE ON CALL (OUTPATIENT)
Age: 54
End: 2019-05-07

## 2019-05-07 LAB
APPEARANCE: CLEAR
BACTERIA: NEGATIVE
BILIRUBIN URINE: NEGATIVE
BLOOD URINE: NEGATIVE
COLOR: NORMAL
CORTIS SERPL-MCNC: 15.6 UG/DL
DHEA-S SERPL-MCNC: 76.8 UG/DL
GLUCOSE QUALITATIVE U: NEGATIVE
HYALINE CASTS: 1 /LPF
KETONES URINE: NEGATIVE
LEUKOCYTE ESTERASE URINE: NEGATIVE
MICROSCOPIC-UA: NORMAL
NITRITE URINE: NEGATIVE
PH URINE: 8
PROTEIN URINE: NEGATIVE
RED BLOOD CELLS URINE: 9 /HPF
SPECIFIC GRAVITY URINE: 1.01
SQUAMOUS EPITHELIAL CELLS: 1 /HPF
UROBILINOGEN URINE: NORMAL
WHITE BLOOD CELLS URINE: 1 /HPF

## 2019-05-09 ENCOUNTER — TRANSCRIPTION ENCOUNTER (OUTPATIENT)
Age: 54
End: 2019-05-09

## 2019-05-10 ENCOUNTER — RESULT CHARGE (OUTPATIENT)
Age: 54
End: 2019-05-10

## 2019-05-10 ENCOUNTER — TRANSCRIPTION ENCOUNTER (OUTPATIENT)
Age: 54
End: 2019-05-10

## 2019-05-10 ENCOUNTER — OTHER (OUTPATIENT)
Age: 54
End: 2019-05-10

## 2019-05-10 LAB — DHEA SERPL-MCNC: 455 NG/DL

## 2019-05-11 ENCOUNTER — TRANSCRIPTION ENCOUNTER (OUTPATIENT)
Age: 54
End: 2019-05-11

## 2019-05-13 ENCOUNTER — TRANSCRIPTION ENCOUNTER (OUTPATIENT)
Age: 54
End: 2019-05-13

## 2019-05-13 ENCOUNTER — RESULT CHARGE (OUTPATIENT)
Age: 54
End: 2019-05-13

## 2019-05-13 ENCOUNTER — OTHER (OUTPATIENT)
Age: 54
End: 2019-05-13

## 2019-05-14 ENCOUNTER — TRANSCRIPTION ENCOUNTER (OUTPATIENT)
Age: 54
End: 2019-05-14

## 2019-09-10 ENCOUNTER — RESULT REVIEW (OUTPATIENT)
Age: 54
End: 2019-09-10

## 2019-09-10 ENCOUNTER — APPOINTMENT (OUTPATIENT)
Dept: OBGYN | Facility: CLINIC | Age: 54
End: 2019-09-10
Payer: COMMERCIAL

## 2019-09-10 PROCEDURE — 99396 PREV VISIT EST AGE 40-64: CPT

## 2019-09-10 PROCEDURE — 36415 COLL VENOUS BLD VENIPUNCTURE: CPT

## 2019-09-16 ENCOUNTER — FORM ENCOUNTER (OUTPATIENT)
Age: 54
End: 2019-09-16

## 2019-09-25 ENCOUNTER — APPOINTMENT (OUTPATIENT)
Dept: FAMILY MEDICINE | Facility: CLINIC | Age: 54
End: 2019-09-25
Payer: COMMERCIAL

## 2019-09-25 VITALS
RESPIRATION RATE: 16 BRPM | HEIGHT: 63 IN | OXYGEN SATURATION: 98 % | SYSTOLIC BLOOD PRESSURE: 100 MMHG | DIASTOLIC BLOOD PRESSURE: 62 MMHG | BODY MASS INDEX: 18.07 KG/M2 | WEIGHT: 102 LBS | HEART RATE: 64 BPM

## 2019-09-25 LAB — CYTOLOGY CVX/VAG DOC THIN PREP: NORMAL

## 2019-09-25 PROCEDURE — 96127 BRIEF EMOTIONAL/BEHAV ASSMT: CPT

## 2019-09-25 PROCEDURE — G0442 ANNUAL ALCOHOL SCREEN 15 MIN: CPT

## 2019-09-25 PROCEDURE — 99396 PREV VISIT EST AGE 40-64: CPT | Mod: 25

## 2019-09-25 RX ORDER — FLUOROMETHOLONE 1 MG/ML
0.1 SOLUTION/ DROPS OPHTHALMIC AS DIRECTED
Qty: 1 | Refills: 0 | Status: COMPLETED | COMMUNITY
Start: 2019-01-14 | End: 2019-09-25

## 2019-09-25 NOTE — HISTORY OF PRESENT ILLNESS
[FreeTextEntry1] : cpe  [de-identified] : Patient is here today for a physical. \par Overall doing well. \par She has been having some nasal congestion. \par On December 10 - saw GYN \par Recently had UTI treated with cipro.

## 2019-09-25 NOTE — ASSESSMENT
[FreeTextEntry1] : Health maintenance\par - full labs done in April\par - EKG and stress test done in April\par - declines flu shot today\par - up to date with tetanus\par - declines mammos - went for US and breast MRI this summer 2019 which was normal\par - pap done 12/2018\par - colonoscopy done 2017\par - negative depression and alcohol screen\par \par Hormone imbalance\par - fluctuation in hormones\par - labs ordered

## 2019-09-25 NOTE — HEALTH RISK ASSESSMENT
[Good] : ~his/her~  mood as  good [No] : In the past 12 months have you used drugs other than those required for medical reasons? No [No falls in past year] : Patient reported no falls in the past year [0] : 2) Feeling down, depressed, or hopeless: Not at all (0) [Patient reported PAP Smear was normal] : Patient reported PAP Smear was normal [Patient reported colonoscopy was normal] : Patient reported colonoscopy was normal [None] : None [Single] : single [Employed] : employed [Fully functional (using the telephone, shopping, preparing meals, housekeeping, doing laundry, using] : Fully functional and needs no help or supervision to perform IADLs (using the telephone, shopping, preparing meals, housekeeping, doing laundry, using transportation, managing medications and managing finances) [Fully functional (bathing, dressing, toileting, transferring, walking, feeding)] : Fully functional (bathing, dressing, toileting, transferring, walking, feeding) [Smoke Detector] : smoke detector [Carbon Monoxide Detector] : carbon monoxide detector [Seat Belt] :  uses seat belt [Name: ___] : Health Care Proxy's Name: [unfilled]  [With Patient/Caregiver] : With Patient/Caregiver [Relationship: ___] : Relationship: [unfilled] [] : No [[id number]] : 0 [Language] : denies difficulty with language [Change in mental status noted] : No change in mental status noted [Behavior] : denies difficulty with behavior [Reasoning] : denies difficulty with reasoning [Reports changes in hearing] : Reports no changes in hearing [Reports changes in vision] : Reports no changes in vision [Reports changes in dental health] : Reports no changes in dental health [MammogramComments] : breast US 7/2019, breast MRI normal [PapSmearDate] : 9/2019 [ColonoscopyDate] : 5/2017 [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

## 2019-09-26 ENCOUNTER — TRANSCRIPTION ENCOUNTER (OUTPATIENT)
Age: 54
End: 2019-09-26

## 2019-10-31 ENCOUNTER — TRANSCRIPTION ENCOUNTER (OUTPATIENT)
Age: 54
End: 2019-10-31

## 2019-10-31 ENCOUNTER — OUTPATIENT (OUTPATIENT)
Dept: OUTPATIENT SERVICES | Facility: HOSPITAL | Age: 54
LOS: 1 days | End: 2019-10-31

## 2019-11-01 ENCOUNTER — TRANSCRIPTION ENCOUNTER (OUTPATIENT)
Age: 54
End: 2019-11-01

## 2019-11-11 ENCOUNTER — TRANSCRIPTION ENCOUNTER (OUTPATIENT)
Age: 54
End: 2019-11-11

## 2019-11-12 ENCOUNTER — TRANSCRIPTION ENCOUNTER (OUTPATIENT)
Age: 54
End: 2019-11-12

## 2019-11-22 ENCOUNTER — RESULT REVIEW (OUTPATIENT)
Age: 54
End: 2019-11-22

## 2019-11-22 ENCOUNTER — APPOINTMENT (OUTPATIENT)
Dept: DERMATOLOGY | Facility: CLINIC | Age: 54
End: 2019-11-22
Payer: COMMERCIAL

## 2019-11-22 PROCEDURE — 11102 TANGNTL BX SKIN SINGLE LES: CPT

## 2019-11-22 PROCEDURE — 99214 OFFICE O/P EST MOD 30 MIN: CPT | Mod: 25

## 2019-12-06 ENCOUNTER — RX RENEWAL (OUTPATIENT)
Age: 54
End: 2019-12-06

## 2019-12-06 DIAGNOSIS — Z82.2 FAMILY HISTORY OF DEAFNESS AND HEARING LOSS: ICD-10-CM

## 2019-12-07 ENCOUNTER — TRANSCRIPTION ENCOUNTER (OUTPATIENT)
Age: 54
End: 2019-12-07

## 2020-01-06 ENCOUNTER — TRANSCRIPTION ENCOUNTER (OUTPATIENT)
Age: 55
End: 2020-01-06

## 2020-01-07 ENCOUNTER — TRANSCRIPTION ENCOUNTER (OUTPATIENT)
Age: 55
End: 2020-01-07

## 2020-01-08 ENCOUNTER — TRANSCRIPTION ENCOUNTER (OUTPATIENT)
Age: 55
End: 2020-01-08

## 2020-01-21 ENCOUNTER — TRANSCRIPTION ENCOUNTER (OUTPATIENT)
Age: 55
End: 2020-01-21

## 2020-01-23 ENCOUNTER — TRANSCRIPTION ENCOUNTER (OUTPATIENT)
Age: 55
End: 2020-01-23

## 2020-01-23 LAB
APPEARANCE: ABNORMAL
BACTERIA: NEGATIVE
BILIRUBIN URINE: NEGATIVE
BLOOD URINE: NEGATIVE
COLOR: YELLOW
CORTIS SERPL-MCNC: 11.2 UG/DL
CRP SERPL-MCNC: <0.1 MG/DL
DHEA-S SERPL-MCNC: 90.9 UG/DL
ERYTHROCYTE [SEDIMENTATION RATE] IN BLOOD BY WESTERGREN METHOD: 5 MM/HR
ESTRADIOL SERPL-MCNC: 5 PG/ML
FSH SERPL-MCNC: 128 IU/L
GLUCOSE QUALITATIVE U: NEGATIVE
HYALINE CASTS: 1 /LPF
KETONES URINE: NEGATIVE
LEUKOCYTE ESTERASE URINE: NEGATIVE
MICROSCOPIC-UA: NORMAL
NITRITE URINE: NEGATIVE
PH URINE: 7
PROGEST SERPL-MCNC: 0.2 NG/ML
PROTEIN URINE: NORMAL
RED BLOOD CELLS URINE: 6 /HPF
SPECIFIC GRAVITY URINE: 1.02
SQUAMOUS EPITHELIAL CELLS: 1 /HPF
UROBILINOGEN URINE: NORMAL
WHITE BLOOD CELLS URINE: 3 /HPF

## 2020-01-24 ENCOUNTER — TRANSCRIPTION ENCOUNTER (OUTPATIENT)
Age: 55
End: 2020-01-24

## 2020-01-24 LAB
BACTERIA UR CULT: NORMAL
SHBG SERPL-SCNC: 131 NMOL/L

## 2020-01-28 ENCOUNTER — TRANSCRIPTION ENCOUNTER (OUTPATIENT)
Age: 55
End: 2020-01-28

## 2020-01-28 ENCOUNTER — NON-APPOINTMENT (OUTPATIENT)
Age: 55
End: 2020-01-28

## 2020-01-28 ENCOUNTER — APPOINTMENT (OUTPATIENT)
Dept: OPHTHALMOLOGY | Facility: CLINIC | Age: 55
End: 2020-01-28
Payer: COMMERCIAL

## 2020-01-28 PROCEDURE — 92012 INTRM OPH EXAM EST PATIENT: CPT

## 2020-01-29 LAB
TESTOST BND SERPL-MCNC: 1.7 PG/ML
TESTOST SERPL-MCNC: 35.3 NG/DL

## 2020-01-30 ENCOUNTER — APPOINTMENT (OUTPATIENT)
Dept: OPHTHALMOLOGY | Facility: CLINIC | Age: 55
End: 2020-01-30
Payer: COMMERCIAL

## 2020-01-30 ENCOUNTER — TRANSCRIPTION ENCOUNTER (OUTPATIENT)
Age: 55
End: 2020-01-30

## 2020-01-30 ENCOUNTER — NON-APPOINTMENT (OUTPATIENT)
Age: 55
End: 2020-01-30

## 2020-01-30 PROCEDURE — 92012 INTRM OPH EXAM EST PATIENT: CPT

## 2020-02-04 ENCOUNTER — NON-APPOINTMENT (OUTPATIENT)
Age: 55
End: 2020-02-04

## 2020-02-04 ENCOUNTER — APPOINTMENT (OUTPATIENT)
Dept: OPHTHALMOLOGY | Facility: CLINIC | Age: 55
End: 2020-02-04
Payer: COMMERCIAL

## 2020-02-04 PROCEDURE — 92012 INTRM OPH EXAM EST PATIENT: CPT

## 2020-02-11 ENCOUNTER — TRANSCRIPTION ENCOUNTER (OUTPATIENT)
Age: 55
End: 2020-02-11

## 2020-02-18 ENCOUNTER — NON-APPOINTMENT (OUTPATIENT)
Age: 55
End: 2020-02-18

## 2020-02-18 ENCOUNTER — APPOINTMENT (OUTPATIENT)
Dept: OPHTHALMOLOGY | Facility: CLINIC | Age: 55
End: 2020-02-18
Payer: COMMERCIAL

## 2020-02-18 PROCEDURE — 92014 COMPRE OPH EXAM EST PT 1/>: CPT

## 2020-02-19 LAB — LH SERPL-ACNC: 37.8 IU/L

## 2020-02-24 ENCOUNTER — TRANSCRIPTION ENCOUNTER (OUTPATIENT)
Age: 55
End: 2020-02-24

## 2020-02-25 ENCOUNTER — TRANSCRIPTION ENCOUNTER (OUTPATIENT)
Age: 55
End: 2020-02-25

## 2020-03-09 ENCOUNTER — NON-APPOINTMENT (OUTPATIENT)
Age: 55
End: 2020-03-09

## 2020-03-09 ENCOUNTER — APPOINTMENT (OUTPATIENT)
Dept: OPHTHALMOLOGY | Facility: CLINIC | Age: 55
End: 2020-03-09
Payer: COMMERCIAL

## 2020-03-09 PROCEDURE — 92014 COMPRE OPH EXAM EST PT 1/>: CPT

## 2020-03-23 ENCOUNTER — NON-APPOINTMENT (OUTPATIENT)
Age: 55
End: 2020-03-23

## 2020-03-23 ENCOUNTER — APPOINTMENT (OUTPATIENT)
Dept: OPHTHALMOLOGY | Facility: CLINIC | Age: 55
End: 2020-03-23
Payer: COMMERCIAL

## 2020-03-23 PROCEDURE — 92012 INTRM OPH EXAM EST PATIENT: CPT

## 2020-04-02 ENCOUNTER — APPOINTMENT (OUTPATIENT)
Dept: OPHTHALMOLOGY | Facility: CLINIC | Age: 55
End: 2020-04-02
Payer: COMMERCIAL

## 2020-04-02 ENCOUNTER — NON-APPOINTMENT (OUTPATIENT)
Age: 55
End: 2020-04-02

## 2020-04-02 PROCEDURE — 99212 OFFICE O/P EST SF 10 MIN: CPT

## 2020-05-05 ENCOUNTER — TRANSCRIPTION ENCOUNTER (OUTPATIENT)
Age: 55
End: 2020-05-05

## 2020-05-05 ENCOUNTER — APPOINTMENT (OUTPATIENT)
Dept: DISASTER EMERGENCY | Facility: CLINIC | Age: 55
End: 2020-05-05
Payer: COMMERCIAL

## 2020-05-05 VITALS
OXYGEN SATURATION: 97 % | RESPIRATION RATE: 14 BRPM | SYSTOLIC BLOOD PRESSURE: 102 MMHG | DIASTOLIC BLOOD PRESSURE: 60 MMHG | HEART RATE: 72 BPM | TEMPERATURE: 98.2 F

## 2020-05-05 DIAGNOSIS — Z13.31 ENCOUNTER FOR SCREENING FOR DEPRESSION: ICD-10-CM

## 2020-05-05 DIAGNOSIS — R39.9 UNSPECIFIED SYMPTOMS AND SIGNS INVOLVING THE GENITOURINARY SYSTEM: ICD-10-CM

## 2020-05-05 PROCEDURE — 99203 OFFICE O/P NEW LOW 30 MIN: CPT

## 2020-05-05 RX ORDER — MECLIZINE HYDROCHLORIDE 25 MG/1
25 TABLET ORAL 3 TIMES DAILY
Qty: 30 | Refills: 0 | Status: DISCONTINUED | COMMUNITY
Start: 2019-12-06 | End: 2020-05-05

## 2020-05-05 RX ORDER — ALPRAZOLAM 0.25 MG/1
0.25 TABLET ORAL
Qty: 10 | Refills: 0 | Status: DISCONTINUED | COMMUNITY
Start: 2020-02-11 | End: 2020-05-05

## 2020-05-05 NOTE — ADDENDUM
[FreeTextEntry1] : Patient  education  - COVID-19   Counseling and education provided to the patient.  Advised sign and symptoms of the virus.  Advised contact precautions.  Educated patient on proper hand washing and to participate in social distancing. \par  \par We discussed how COVID 19  is tested.  Which is via nasopharyngeal swab.  Patient agree to testing at this time.\par   \par Advised  Ms. GODINEZ  if they feel SOB, DELACRUZ, change in mental status, orthopnea, or dizziness upon standing to seek immediate medical attention. \par \par Patient is in full awareness of the plan and agrees to it.  All pt question was answered.  \par \par Ms. GODINEZ  advised to self quarantine for 4 days. Discussed in full detail what that means. Note was provided to the pt.\par \par  GABRIELLE  was advised to contact anybody that has been in close contact with since she started feeling sick.  They should be told that you are highly suspicious for being COVID 19 positive and they should monitor for signs and symptoms of COVID.  If the start to have symptoms to call there medical provider for evaluation.  \par \par Hand out was given to the patient to refer to for more information about COVID 19.

## 2020-05-05 NOTE — HISTORY OF PRESENT ILLNESS
[Patient presents to the office today for COVID-19 evaluation and testing.] : Patient presents to the office today for COVID-19 evaluation and testing. [Patient has been pre-screened by RN at call center for appointment today with our facility.] : Patient has been pre-screened by RN at call center for appointment today with our facility. [] : no dizziness on standing [With Suspected Case] : patient exposed to a suspected case of COVID-19 [None] : none [Clear] : clear [Good Air Entry] : good air entry [Speaks in full sentences] : speaks in full sentences [Normal O2 sat at rest] : normal O2 sat at rest [Grossly normal, interacts, not tired or weak] : grossly normal, interacts, not tired or weak [Discharged with current Quarantine instructions and advised of signs of worsening illness.] : Patient discharged with current quarantine instructions and advised of signs of worsening illness. Patient told to seek emergent care if symptoms occur. [COVID-19 testing ordered and specimen obtained] : COVID-19 testing ordered and specimen obtained [FreeTextEntry1] : Patient ha no symptoms. 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

## 2020-05-06 ENCOUNTER — APPOINTMENT (OUTPATIENT)
Dept: FAMILY MEDICINE | Facility: CLINIC | Age: 55
End: 2020-05-06
Payer: COMMERCIAL

## 2020-05-06 LAB — SARS-COV-2 N GENE NPH QL NAA+PROBE: DETECTED

## 2020-05-06 PROCEDURE — 99213 OFFICE O/P EST LOW 20 MIN: CPT | Mod: 95

## 2020-05-06 NOTE — HISTORY OF PRESENT ILLNESS
[Home] : at home, [unfilled] , at the time of the visit. [Medical Office: ([facility])___] : at the medical office located in  [Patient] : the patient [FreeTextEntry8] : HAd symptoms about 4-5 weeks ago\par Seen in Testing site yesterday and tested positive.\par She had antibody testing and tested negative for IgM.\par Tested positive for IgG.\par She has an elderly father who she is afraid to visit because she tested positive.

## 2020-05-06 NOTE — PHYSICAL EXAM
[No Acute Distress] : no acute distress [Normal Sclera/Conjunctiva] : normal sclera/conjunctiva [No Respiratory Distress] : no respiratory distress  [de-identified] : anxious appearing

## 2020-05-06 NOTE — ASSESSMENT
[FreeTextEntry1] : COVID Positive yesterday\par IgM neg, IgG postive\par discussed CDC guidelines. 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 Advised to get repeat testing for a negative test.\par

## 2020-05-12 ENCOUNTER — TRANSCRIPTION ENCOUNTER (OUTPATIENT)
Age: 55
End: 2020-05-12

## 2020-05-13 ENCOUNTER — TRANSCRIPTION ENCOUNTER (OUTPATIENT)
Age: 55
End: 2020-05-13

## 2020-05-15 ENCOUNTER — TRANSCRIPTION ENCOUNTER (OUTPATIENT)
Age: 55
End: 2020-05-15

## 2020-05-18 ENCOUNTER — TRANSCRIPTION ENCOUNTER (OUTPATIENT)
Age: 55
End: 2020-05-18

## 2020-05-26 ENCOUNTER — LABORATORY RESULT (OUTPATIENT)
Age: 55
End: 2020-05-26

## 2020-05-26 ENCOUNTER — TRANSCRIPTION ENCOUNTER (OUTPATIENT)
Age: 55
End: 2020-05-26

## 2020-05-27 ENCOUNTER — TRANSCRIPTION ENCOUNTER (OUTPATIENT)
Age: 55
End: 2020-05-27

## 2020-05-28 ENCOUNTER — TRANSCRIPTION ENCOUNTER (OUTPATIENT)
Age: 55
End: 2020-05-28

## 2020-05-29 ENCOUNTER — RESULT REVIEW (OUTPATIENT)
Age: 55
End: 2020-05-29

## 2020-05-29 ENCOUNTER — APPOINTMENT (OUTPATIENT)
Dept: DERMATOLOGY | Facility: CLINIC | Age: 55
End: 2020-05-29
Payer: COMMERCIAL

## 2020-05-29 PROCEDURE — 11103 TANGNTL BX SKIN EA SEP/ADDL: CPT | Mod: 59

## 2020-05-29 PROCEDURE — 17110 DESTRUCTION B9 LES UP TO 14: CPT

## 2020-05-29 PROCEDURE — 11102 TANGNTL BX SKIN SINGLE LES: CPT | Mod: 59

## 2020-05-29 PROCEDURE — 99214 OFFICE O/P EST MOD 30 MIN: CPT | Mod: 25

## 2020-06-04 ENCOUNTER — APPOINTMENT (OUTPATIENT)
Dept: FAMILY MEDICINE | Facility: CLINIC | Age: 55
End: 2020-06-04
Payer: COMMERCIAL

## 2020-06-04 VITALS
SYSTOLIC BLOOD PRESSURE: 100 MMHG | BODY MASS INDEX: 18.07 KG/M2 | HEART RATE: 81 BPM | HEIGHT: 63 IN | WEIGHT: 102 LBS | DIASTOLIC BLOOD PRESSURE: 62 MMHG | OXYGEN SATURATION: 98 %

## 2020-06-04 PROCEDURE — 99204 OFFICE O/P NEW MOD 45 MIN: CPT

## 2020-06-04 NOTE — PHYSICAL EXAM
[No Acute Distress] : no acute distress [Well-Appearing] : well-appearing [Normal Sclera/Conjunctiva] : normal sclera/conjunctiva [Normal Outer Ear/Nose] : the outer ears and nose were normal in appearance [No Respiratory Distress] : no respiratory distress  [No Accessory Muscle Use] : no accessory muscle use [Clear to Auscultation] : lungs were clear to auscultation bilaterally [Normal Rate] : normal rate  [Regular Rhythm] : with a regular rhythm [Grossly Normal Strength/Tone] : grossly normal strength/tone [No Focal Deficits] : no focal deficits

## 2020-06-06 ENCOUNTER — TRANSCRIPTION ENCOUNTER (OUTPATIENT)
Age: 55
End: 2020-06-06

## 2020-06-09 ENCOUNTER — NON-APPOINTMENT (OUTPATIENT)
Age: 55
End: 2020-06-09

## 2020-06-09 ENCOUNTER — TRANSCRIPTION ENCOUNTER (OUTPATIENT)
Age: 55
End: 2020-06-09

## 2020-06-09 ENCOUNTER — APPOINTMENT (OUTPATIENT)
Dept: CARDIOLOGY | Facility: CLINIC | Age: 55
End: 2020-06-09
Payer: COMMERCIAL

## 2020-06-09 VITALS
BODY MASS INDEX: 18.07 KG/M2 | DIASTOLIC BLOOD PRESSURE: 70 MMHG | HEART RATE: 80 BPM | HEIGHT: 63 IN | SYSTOLIC BLOOD PRESSURE: 114 MMHG | OXYGEN SATURATION: 100 % | WEIGHT: 102 LBS

## 2020-06-09 PROCEDURE — 93000 ELECTROCARDIOGRAM COMPLETE: CPT

## 2020-06-09 PROCEDURE — 99214 OFFICE O/P EST MOD 30 MIN: CPT | Mod: 25

## 2020-06-09 RX ORDER — GLUCOSA SU 2KCL/CHONDROITIN SU 500-400 MG
500 CAPSULE ORAL DAILY
Qty: 1 | Refills: 1 | Status: COMPLETED | COMMUNITY
Start: 2019-09-26 | End: 2020-06-09

## 2020-06-09 NOTE — PLAN
[FreeTextEntry1] : plan as above. Repeat labs in 3 months. Pt concerned for slightly elevated LDL. Counseled on diet and exercise. Does not need medication at this time. Has f/u with cardio. \par \par thyroid nodules\par - f/u annual thyroid sono \par \par  COVID + in 4/20\par -  positive IgG on labs\par \par f/u for CPE or PRN\par \par \par \par

## 2020-06-09 NOTE — HISTORY OF PRESENT ILLNESS
[FreeTextEntry1] : establish care , review blood work  [de-identified] : 55 year old female presents to establish care. Was referred from Dr. Landin as she is no longer at [place]. Had recent blood work done and would like to discuss results. She states she has a hx of thyroid nodules and gets annual sonograms. She gets breast US and pelvic US every 6 months by GYN. She tested positive for covid ab. Recalls having symptoms in April followed by a positive COVID-19 test on 5/5. \par She is followed by multiple specialists including cardiology, dermatology and GYN. She previously was followed by urology for a small renal cyst that was found incidentally on pelvic US but does not longer need to followup unless change is noted .

## 2020-06-13 ENCOUNTER — NON-APPOINTMENT (OUTPATIENT)
Age: 55
End: 2020-06-13

## 2020-06-15 ENCOUNTER — NON-APPOINTMENT (OUTPATIENT)
Age: 55
End: 2020-06-15

## 2020-06-15 ENCOUNTER — APPOINTMENT (OUTPATIENT)
Dept: OPHTHALMOLOGY | Facility: CLINIC | Age: 55
End: 2020-06-15
Payer: COMMERCIAL

## 2020-06-15 PROCEDURE — 92012 INTRM OPH EXAM EST PATIENT: CPT

## 2020-06-17 NOTE — REVIEW OF SYSTEMS
[see HPI] : see HPI [Negative] : Heme/Lymph [Shortness Of Breath] : no shortness of breath [Chest  Pressure] : no chest pressure [Dyspnea on exertion] : not dyspnea during exertion [Lower Ext Edema] : no extremity edema [Chest Pain] : chest pain [Leg Claudication] : no intermittent leg claudication [Palpitations] : no palpitations

## 2020-06-17 NOTE — HISTORY OF PRESENT ILLNESS
[FreeTextEntry1] : Pt is a 54 y/o F who presents today for f/u.  On recent lab work her cholesterol level had increased (LDL and Tchol higher with high HDL).  She tells me today that she felt sick 04/2020 and then tested positive for COVID 05/05/2020 - now with some residual chest discomfort but feeling better overall\par In the past she has seen multiple cardiologists: Dr Kristina Lauren then was following with Dr Melvin Banuelos and most recently Dr Shaina Gordon.  \par Previous ECG shows NSR with poor R wave progression.  \par My nurse was present for today's exam\par \par ETT 01/2018: no ischemic changes, exercise time 9min 5 sec\par ETT 04/2019 no ischemic changes, exercise time 9min 30sec\par TTE 03/2016 normal LV function without significant valvular disease\par TTE 04/2019 EF 65-70% without significant valvular disease\par \par 04/2019 - 05/2020:\par A1c = 4.7 - 4.9\par TG 59 - 78\par Tchol 193 - 228\par HDL 77 - 78\par  - 134\par PMH: none\par Smoking status: never\par No drug use or Etoh\par Current exercise: none\par Daily water intake: "not much"\par Daily caffeine intake: soda - few cups\par OTC medications: folic acid, vit D, probiotic, vit C, vit D\par Family hx: father CAD s/p PCI at age 85\par Previous cardiac testing: as mentioned\par Previous hospitalizations: breast augmentation, R knee surgery - no problems with anesthesia\par no children\par

## 2020-06-17 NOTE — PHYSICAL EXAM
[General Appearance - Well Developed] : well developed [Normal Appearance] : normal appearance [General Appearance - Well Nourished] : well nourished [Well Groomed] : well groomed [No Deformities] : no deformities [Normal Conjunctiva] : the conjunctiva exhibited no abnormalities [General Appearance - In No Acute Distress] : no acute distress [Normal Oral Mucosa] : normal oral mucosa [No Oral Pallor] : no oral pallor [Eyelids - No Xanthelasma] : the eyelids demonstrated no xanthelasmas [No Oral Cyanosis] : no oral cyanosis [Respiration, Rhythm And Depth] : normal respiratory rhythm and effort [Exaggerated Use Of Accessory Muscles For Inspiration] : no accessory muscle use [Auscultation Breath Sounds / Voice Sounds] : lungs were clear to auscultation bilaterally [Heart Rate And Rhythm] : heart rate and rhythm were normal [Heart Sounds] : normal S1 and S2 [Murmurs] : no murmurs present [Arterial Pulses Normal] : the arterial pulses were normal [Abdomen Soft] : soft [Abdomen Tenderness] : non-tender [Edema] : no peripheral edema present [Abdomen Mass (___ Cm)] : no abdominal mass palpated [Gait - Sufficient For Exercise Testing] : the gait was sufficient for exercise testing [Abnormal Walk] : normal gait [Nail Clubbing] : no clubbing of the fingernails [Cyanosis, Localized] : no localized cyanosis [Petechial Hemorrhages (___cm)] : no petechial hemorrhages [] : no ischemic changes [Oriented To Time, Place, And Person] : oriented to person, place, and time [Affect] : the affect was normal [No Anxiety] : not feeling anxious [Impaired Insight] : insight and judgment were intact [Mood] : the mood was normal [FreeTextEntry1] : no JVD or bruits

## 2020-06-17 NOTE — DISCUSSION/SUMMARY
[FreeTextEntry1] : Pt is a 54 y/o F who presents today for f/u. \par ECG shows NSR without ST-T wave abnormalities. \par ETT 01/2018: no ischemic changes, exercise time 9min 5 sec\par ETT 04/2019 no ischemic changes, exercise time 9min 30sec\par TTE 03/2016 normal LV function without significant valvular disease\par TTE 04/2019 EF 65-70% without significant valvular disease\par No need for repeat cardiac testing at this time\par Mild elevation in cholesterol numbers - will monitor closely. Advised lifestyle modifications \par Recent COVID 19 infection with some residual symptoms, monitor for now.  If symptoms worsen, can consider further cardiac evaluation\par VSS\par The described plan was discussed with the pt.  All questions and concerns were addressed to the best of my knowledge. \par

## 2020-08-03 ENCOUNTER — FORM ENCOUNTER (OUTPATIENT)
Age: 55
End: 2020-08-03

## 2020-09-01 ENCOUNTER — APPOINTMENT (OUTPATIENT)
Dept: OPHTHALMOLOGY | Facility: CLINIC | Age: 55
End: 2020-09-01
Payer: COMMERCIAL

## 2020-09-01 ENCOUNTER — NON-APPOINTMENT (OUTPATIENT)
Age: 55
End: 2020-09-01

## 2020-09-01 PROCEDURE — 92012 INTRM OPH EXAM EST PATIENT: CPT

## 2020-09-04 ENCOUNTER — TRANSCRIPTION ENCOUNTER (OUTPATIENT)
Age: 55
End: 2020-09-04

## 2020-09-04 LAB
ALUMINUM SERPL-MCNC: 7 UG/L
APPEARANCE: CLEAR
BACTERIA: NEGATIVE
BILIRUBIN URINE: NEGATIVE
BLOOD URINE: NEGATIVE
CHOLEST SERPL-MCNC: 181 MG/DL
CHOLEST/HDLC SERPL: 2.4 RATIO
COLOR: NORMAL
CREAT SPEC-SCNC: 81 MG/DL
GLUCOSE QUALITATIVE U: NEGATIVE
HDLC SERPL-MCNC: 76 MG/DL
HYALINE CASTS: 2 /LPF
KETONES URINE: NEGATIVE
LDLC SERPL CALC-MCNC: 95 MG/DL
LEUKOCYTE ESTERASE URINE: NEGATIVE
MICROALBUMIN 24H UR DL<=1MG/L-MCNC: 1.7 MG/DL
MICROALBUMIN/CREAT 24H UR-RTO: 21 MG/G
MICROSCOPIC-UA: NORMAL
NITRITE URINE: NEGATIVE
PH URINE: 8
PROTEIN URINE: NEGATIVE
RED BLOOD CELLS URINE: 2 /HPF
SPECIFIC GRAVITY URINE: 1.01
SQUAMOUS EPITHELIAL CELLS: 4 /HPF
TRIGL SERPL-MCNC: 53 MG/DL
UROBILINOGEN URINE: NORMAL
VIT B12 SERPL-MCNC: 1248 PG/ML
WHITE BLOOD CELLS URINE: 1 /HPF

## 2020-09-05 ENCOUNTER — TRANSCRIPTION ENCOUNTER (OUTPATIENT)
Age: 55
End: 2020-09-05

## 2020-09-14 ENCOUNTER — FORM ENCOUNTER (OUTPATIENT)
Age: 55
End: 2020-09-14

## 2020-09-15 ENCOUNTER — RESULT REVIEW (OUTPATIENT)
Age: 55
End: 2020-09-15

## 2020-09-15 ENCOUNTER — APPOINTMENT (OUTPATIENT)
Dept: OBGYN | Facility: CLINIC | Age: 55
End: 2020-09-15
Payer: COMMERCIAL

## 2020-09-15 PROCEDURE — 36415 COLL VENOUS BLD VENIPUNCTURE: CPT

## 2020-09-15 PROCEDURE — 99396 PREV VISIT EST AGE 40-64: CPT

## 2020-09-21 ENCOUNTER — FORM ENCOUNTER (OUTPATIENT)
Age: 55
End: 2020-09-21

## 2020-11-02 ENCOUNTER — APPOINTMENT (OUTPATIENT)
Dept: FAMILY MEDICINE | Facility: CLINIC | Age: 55
End: 2020-11-02
Payer: COMMERCIAL

## 2020-11-02 VITALS
OXYGEN SATURATION: 98 % | WEIGHT: 100 LBS | DIASTOLIC BLOOD PRESSURE: 70 MMHG | HEART RATE: 88 BPM | TEMPERATURE: 97.6 F | HEIGHT: 63.75 IN | SYSTOLIC BLOOD PRESSURE: 132 MMHG | BODY MASS INDEX: 17.28 KG/M2

## 2020-11-02 VITALS — DIASTOLIC BLOOD PRESSURE: 70 MMHG | SYSTOLIC BLOOD PRESSURE: 120 MMHG

## 2020-11-02 PROCEDURE — 90471 IMMUNIZATION ADMIN: CPT

## 2020-11-02 PROCEDURE — 99396 PREV VISIT EST AGE 40-64: CPT | Mod: 25

## 2020-11-02 PROCEDURE — 90715 TDAP VACCINE 7 YRS/> IM: CPT

## 2020-11-02 PROCEDURE — 99072 ADDL SUPL MATRL&STAF TM PHE: CPT

## 2020-11-02 NOTE — PLAN
[FreeTextEntry1] : Thyroid nodules\par - f/u US. Has appt in a few weeks\par - f/u annually\par \par Breast CA screening\par - cont breast US and MRI\par \par HCM\par - tdap administered in L deltoid, pt tolerated well\par - pt will need referrals in January when she gets a new insurance card \par - f/u labs. Pt gets them done in April.

## 2020-11-02 NOTE — PHYSICAL EXAM
[No Acute Distress] : no acute distress [Well-Appearing] : well-appearing [Normal Sclera/Conjunctiva] : normal sclera/conjunctiva [Normal Outer Ear/Nose] : the outer ears and nose were normal in appearance [No Respiratory Distress] : no respiratory distress  [No Accessory Muscle Use] : no accessory muscle use [Clear to Auscultation] : lungs were clear to auscultation bilaterally [Normal Rate] : normal rate  [Regular Rhythm] : with a regular rhythm [Soft] : abdomen soft [Non Tender] : non-tender [Non-distended] : non-distended [Normal Bowel Sounds] : normal bowel sounds [No Rash] : no rash [No Focal Deficits] : no focal deficits [Normal Affect] : the affect was normal [Normal Insight/Judgement] : insight and judgment were intact

## 2020-11-02 NOTE — REVIEW OF SYSTEMS
[Depression] : depression [Fever] : no fever [Chills] : no chills [Fatigue] : no fatigue [Discharge] : no discharge [Vision Problems] : no vision problems [Earache] : no earache [Nasal Discharge] : no nasal discharge [Sore Throat] : no sore throat [Chest Pain] : no chest pain [Palpitations] : no palpitations [Shortness Of Breath] : no shortness of breath [Wheezing] : no wheezing [Cough] : no cough [Abdominal Pain] : no abdominal pain [Nausea] : no nausea [Diarrhea] : diarrhea [Vomiting] : no vomiting [Dysuria] : no dysuria [Hematuria] : no hematuria [Headache] : no headache [Dizziness] : no dizziness [Anxiety] : no anxiety

## 2020-11-02 NOTE — HEALTH RISK ASSESSMENT
[Patient reported mammogram was normal] : Patient reported mammogram was normal [Patient reported colonoscopy was normal] : Patient reported colonoscopy was normal [MammogramDate] : 10/20 [MammogramComments] : breast MRI [ColonoscopyDate] : 05/17

## 2020-11-02 NOTE — HISTORY OF PRESENT ILLNESS
[FreeTextEntry1] : CPE [de-identified] : 55 year old female presents for CPE. She is feeling well today overall.\maile Admits to recently losing her father in August and since has been suffering from stress and anxiety. She is coping with it and does not feel she needs additional help or medication. \par She follows with multiple specialists annually and is UTD with preventative screening\par She does not get mammograms due to hx of breast implants. Instead she undergoes breast US twice a year and an MRI once a year which she had last month and was normal. \maile Has upcoming appt for annual thyroid US.

## 2020-11-05 ENCOUNTER — TRANSCRIPTION ENCOUNTER (OUTPATIENT)
Age: 55
End: 2020-11-05

## 2020-11-23 ENCOUNTER — LABORATORY RESULT (OUTPATIENT)
Age: 55
End: 2020-11-23

## 2020-11-23 ENCOUNTER — APPOINTMENT (OUTPATIENT)
Dept: FAMILY MEDICINE | Facility: CLINIC | Age: 55
End: 2020-11-23
Payer: COMMERCIAL

## 2020-11-23 ENCOUNTER — APPOINTMENT (OUTPATIENT)
Dept: RADIOLOGY | Facility: CLINIC | Age: 55
End: 2020-11-23
Payer: COMMERCIAL

## 2020-11-23 ENCOUNTER — OUTPATIENT (OUTPATIENT)
Dept: OUTPATIENT SERVICES | Facility: HOSPITAL | Age: 55
LOS: 1 days | End: 2020-11-23
Payer: COMMERCIAL

## 2020-11-23 VITALS
BODY MASS INDEX: 17.11 KG/M2 | HEIGHT: 63.75 IN | OXYGEN SATURATION: 99 % | TEMPERATURE: 98.2 F | WEIGHT: 99 LBS | HEART RATE: 83 BPM

## 2020-11-23 DIAGNOSIS — R05 COUGH: ICD-10-CM

## 2020-11-23 DIAGNOSIS — Z00.00 ENCOUNTER FOR GENERAL ADULT MEDICAL EXAMINATION WITHOUT ABNORMAL FINDINGS: ICD-10-CM

## 2020-11-23 DIAGNOSIS — Z13.29 ENCOUNTER FOR SCREENING FOR OTHER SUSPECTED ENDOCRINE DISORDER: ICD-10-CM

## 2020-11-23 DIAGNOSIS — Z13.1 ENCOUNTER FOR SCREENING FOR DIABETES MELLITUS: ICD-10-CM

## 2020-11-23 DIAGNOSIS — Z13.220 ENCOUNTER FOR SCREENING FOR LIPOID DISORDERS: ICD-10-CM

## 2020-11-23 PROCEDURE — 99214 OFFICE O/P EST MOD 30 MIN: CPT

## 2020-11-23 PROCEDURE — 71046 X-RAY EXAM CHEST 2 VIEWS: CPT

## 2020-11-23 PROCEDURE — 71046 X-RAY EXAM CHEST 2 VIEWS: CPT | Mod: 26

## 2020-11-24 ENCOUNTER — NON-APPOINTMENT (OUTPATIENT)
Age: 55
End: 2020-11-24

## 2020-11-24 ENCOUNTER — APPOINTMENT (OUTPATIENT)
Dept: OPHTHALMOLOGY | Facility: CLINIC | Age: 55
End: 2020-11-24
Payer: COMMERCIAL

## 2020-11-24 LAB
25(OH)D3 SERPL-MCNC: 56.5 NG/ML
ALBUMIN SERPL ELPH-MCNC: 4.9 G/DL
ALP BLD-CCNC: 67 U/L
ALT SERPL-CCNC: 8 U/L
ANION GAP SERPL CALC-SCNC: 13 MMOL/L
AST SERPL-CCNC: 17 U/L
BASOPHILS # BLD AUTO: 0.05 K/UL
BASOPHILS NFR BLD AUTO: 0.8 %
BILIRUB SERPL-MCNC: 0.4 MG/DL
BUN SERPL-MCNC: 12 MG/DL
CALCIUM SERPL-MCNC: 9.6 MG/DL
CHLORIDE SERPL-SCNC: 102 MMOL/L
CHOLEST SERPL-MCNC: 209 MG/DL
CO2 SERPL-SCNC: 28 MMOL/L
CREAT SERPL-MCNC: 0.7 MG/DL
EOSINOPHIL # BLD AUTO: 0.03 K/UL
EOSINOPHIL NFR BLD AUTO: 0.5 %
ERYTHROCYTE [SEDIMENTATION RATE] IN BLOOD BY WESTERGREN METHOD: 9 MM/HR
ESTIMATED AVERAGE GLUCOSE: 97 MG/DL
GLUCOSE SERPL-MCNC: 84 MG/DL
HBA1C MFR BLD HPLC: 5 %
HCT VFR BLD CALC: 44.7 %
HDLC SERPL-MCNC: 78 MG/DL
HGB BLD-MCNC: 14 G/DL
IMM GRANULOCYTES NFR BLD AUTO: 0.3 %
LDLC SERPL CALC-MCNC: 109 MG/DL
LYMPHOCYTES # BLD AUTO: 1.38 K/UL
LYMPHOCYTES NFR BLD AUTO: 20.8 %
MAN DIFF?: NORMAL
MCHC RBC-ENTMCNC: 29.4 PG
MCHC RBC-ENTMCNC: 31.3 GM/DL
MCV RBC AUTO: 93.7 FL
MONOCYTES # BLD AUTO: 0.37 K/UL
MONOCYTES NFR BLD AUTO: 5.6 %
NEUTROPHILS # BLD AUTO: 4.8 K/UL
NEUTROPHILS NFR BLD AUTO: 72 %
NONHDLC SERPL-MCNC: 131 MG/DL
PLATELET # BLD AUTO: 219 K/UL
POTASSIUM SERPL-SCNC: 4.1 MMOL/L
PROT SERPL-MCNC: 7.3 G/DL
RBC # BLD: 4.77 M/UL
RBC # FLD: 12.1 %
RHEUMATOID FACT SER QL: 14 IU/ML
SODIUM SERPL-SCNC: 143 MMOL/L
T4 SERPL-MCNC: 6.7 UG/DL
TRIGL SERPL-MCNC: 107 MG/DL
TSH SERPL-ACNC: 0.35 UIU/ML
URATE SERPL-MCNC: 3 MG/DL
VIT B12 SERPL-MCNC: 1068 PG/ML
WBC # FLD AUTO: 6.65 K/UL

## 2020-11-24 PROCEDURE — 92012 INTRM OPH EXAM EST PATIENT: CPT

## 2020-11-25 VITALS — DIASTOLIC BLOOD PRESSURE: 70 MMHG | SYSTOLIC BLOOD PRESSURE: 114 MMHG

## 2020-11-25 LAB — B BURGDOR IGG+IGM SER QL IB: NORMAL

## 2020-11-30 LAB — ANA SER IF-ACNC: NEGATIVE

## 2020-12-22 ENCOUNTER — TRANSCRIPTION ENCOUNTER (OUTPATIENT)
Age: 55
End: 2020-12-22

## 2020-12-23 ENCOUNTER — TRANSCRIPTION ENCOUNTER (OUTPATIENT)
Age: 55
End: 2020-12-23

## 2020-12-29 ENCOUNTER — TRANSCRIPTION ENCOUNTER (OUTPATIENT)
Age: 55
End: 2020-12-29

## 2021-01-04 DIAGNOSIS — E34.9 ENDOCRINE DISORDER, UNSPECIFIED: ICD-10-CM

## 2021-01-05 ENCOUNTER — TRANSCRIPTION ENCOUNTER (OUTPATIENT)
Age: 56
End: 2021-01-05

## 2021-01-07 ENCOUNTER — TRANSCRIPTION ENCOUNTER (OUTPATIENT)
Age: 56
End: 2021-01-07

## 2021-01-07 LAB
CORTIS SERPL-MCNC: 12.4 UG/DL
CRP SERPL-MCNC: <0.1 MG/DL
DHEA-S SERPL-MCNC: 82.1 UG/DL
ERYTHROCYTE [SEDIMENTATION RATE] IN BLOOD BY WESTERGREN METHOD: 5 MM/HR
ESTRADIOL SERPL-MCNC: <5 PG/ML
FSH SERPL-MCNC: 80.2 IU/L
LH SERPL-ACNC: 25.1 IU/L
PROGEST SERPL-MCNC: 0.2 NG/ML

## 2021-01-08 ENCOUNTER — TRANSCRIPTION ENCOUNTER (OUTPATIENT)
Age: 56
End: 2021-01-08

## 2021-01-08 LAB — SHBG SERPL-SCNC: 166 NMOL/L

## 2021-01-09 ENCOUNTER — TRANSCRIPTION ENCOUNTER (OUTPATIENT)
Age: 56
End: 2021-01-09

## 2021-01-11 ENCOUNTER — TRANSCRIPTION ENCOUNTER (OUTPATIENT)
Age: 56
End: 2021-01-11

## 2021-01-11 LAB
TESTOST BND SERPL-MCNC: 1.4 PG/ML
TESTOST SERPL-MCNC: 31.2 NG/DL

## 2021-01-14 ENCOUNTER — TRANSCRIPTION ENCOUNTER (OUTPATIENT)
Age: 56
End: 2021-01-14

## 2021-01-14 LAB
APPEARANCE: ABNORMAL
BACTERIA UR CULT: NORMAL
BACTERIA: NEGATIVE
BILIRUBIN URINE: NEGATIVE
BLOOD URINE: NEGATIVE
COLOR: YELLOW
GLUCOSE QUALITATIVE U: NEGATIVE
HYALINE CASTS: 0 /LPF
KETONES URINE: NEGATIVE
LEUKOCYTE ESTERASE URINE: NEGATIVE
MICROSCOPIC-UA: NORMAL
NITRITE URINE: NEGATIVE
PH URINE: 8
PROTEIN URINE: NORMAL
RED BLOOD CELLS URINE: 3 /HPF
SPECIFIC GRAVITY URINE: 1.02
SQUAMOUS EPITHELIAL CELLS: 1 /HPF
UROBILINOGEN URINE: NORMAL
WHITE BLOOD CELLS URINE: 1 /HPF

## 2021-01-19 ENCOUNTER — TRANSCRIPTION ENCOUNTER (OUTPATIENT)
Age: 56
End: 2021-01-19

## 2021-01-29 ENCOUNTER — APPOINTMENT (OUTPATIENT)
Dept: DERMATOLOGY | Facility: CLINIC | Age: 56
End: 2021-01-29
Payer: COMMERCIAL

## 2021-01-29 PROCEDURE — 99213 OFFICE O/P EST LOW 20 MIN: CPT

## 2021-01-29 PROCEDURE — 99072 ADDL SUPL MATRL&STAF TM PHE: CPT

## 2021-02-08 ENCOUNTER — APPOINTMENT (OUTPATIENT)
Dept: DERMATOLOGY | Facility: CLINIC | Age: 56
End: 2021-02-08

## 2021-04-06 ENCOUNTER — TRANSCRIPTION ENCOUNTER (OUTPATIENT)
Age: 56
End: 2021-04-06

## 2021-04-07 ENCOUNTER — TRANSCRIPTION ENCOUNTER (OUTPATIENT)
Age: 56
End: 2021-04-07

## 2021-04-09 ENCOUNTER — TRANSCRIPTION ENCOUNTER (OUTPATIENT)
Age: 56
End: 2021-04-09

## 2021-04-13 ENCOUNTER — TRANSCRIPTION ENCOUNTER (OUTPATIENT)
Age: 56
End: 2021-04-13

## 2021-04-15 ENCOUNTER — TRANSCRIPTION ENCOUNTER (OUTPATIENT)
Age: 56
End: 2021-04-15

## 2021-04-15 ENCOUNTER — NON-APPOINTMENT (OUTPATIENT)
Age: 56
End: 2021-04-15

## 2021-04-16 ENCOUNTER — APPOINTMENT (OUTPATIENT)
Dept: OPHTHALMOLOGY | Facility: CLINIC | Age: 56
End: 2021-04-16
Payer: COMMERCIAL

## 2021-04-16 ENCOUNTER — NON-APPOINTMENT (OUTPATIENT)
Age: 56
End: 2021-04-16

## 2021-04-16 PROCEDURE — 99072 ADDL SUPL MATRL&STAF TM PHE: CPT

## 2021-04-16 PROCEDURE — 92014 COMPRE OPH EXAM EST PT 1/>: CPT

## 2021-04-20 ENCOUNTER — LABORATORY RESULT (OUTPATIENT)
Age: 56
End: 2021-04-20

## 2021-04-22 ENCOUNTER — TRANSCRIPTION ENCOUNTER (OUTPATIENT)
Age: 56
End: 2021-04-22

## 2021-04-22 LAB
25(OH)D3 SERPL-MCNC: 54.2 NG/ML
ALBUMIN SERPL ELPH-MCNC: 4.7 G/DL
ALP BLD-CCNC: 81 U/L
ALT SERPL-CCNC: 8 U/L
AMYLASE/CREAT SERPL: 86 U/L
ANION GAP SERPL CALC-SCNC: 15 MMOL/L
AST SERPL-CCNC: 18 U/L
BASOPHILS # BLD AUTO: 0.05 K/UL
BASOPHILS NFR BLD AUTO: 1.1 %
BILIRUB SERPL-MCNC: 0.4 MG/DL
BUN SERPL-MCNC: 13 MG/DL
CALCIUM SERPL-MCNC: 9.7 MG/DL
CHLORIDE SERPL-SCNC: 103 MMOL/L
CHOLEST SERPL-MCNC: 214 MG/DL
CO2 SERPL-SCNC: 26 MMOL/L
CORTIS SERPL-MCNC: 13.1 UG/DL
CREAT SERPL-MCNC: 0.71 MG/DL
CRP SERPL-MCNC: 4 MG/L
DHEA-S SERPL-MCNC: 99.9 UG/DL
EOSINOPHIL # BLD AUTO: 0.04 K/UL
EOSINOPHIL NFR BLD AUTO: 0.9 %
ERYTHROCYTE [SEDIMENTATION RATE] IN BLOOD BY WESTERGREN METHOD: 18 MM/HR
ESTIMATED AVERAGE GLUCOSE: 94 MG/DL
ESTRADIOL SERPL-MCNC: 9 PG/ML
FERRITIN SERPL-MCNC: 104 NG/ML
FOLATE SERPL-MCNC: >20 NG/ML
FSH SERPL-MCNC: 130 IU/L
GLUCOSE SERPL-MCNC: 96 MG/DL
HBA1C MFR BLD HPLC: 4.9 %
HCT VFR BLD CALC: 45.5 %
HDLC SERPL-MCNC: 82 MG/DL
HGB BLD-MCNC: 14.3 G/DL
IMM GRANULOCYTES NFR BLD AUTO: 0.2 %
INSULIN SERPL-MCNC: 15 UU/ML
IRON SATN MFR SERPL: 27 %
IRON SERPL-MCNC: 85 UG/DL
LDLC SERPL CALC-MCNC: 114 MG/DL
LH SERPL-ACNC: 53.3 IU/L
LPL SERPL-CCNC: 33 U/L
LYMPHOCYTES # BLD AUTO: 1.09 K/UL
LYMPHOCYTES NFR BLD AUTO: 24.3 %
MAN DIFF?: NORMAL
MCHC RBC-ENTMCNC: 30 PG
MCHC RBC-ENTMCNC: 31.4 GM/DL
MCV RBC AUTO: 95.4 FL
MONOCYTES # BLD AUTO: 0.26 K/UL
MONOCYTES NFR BLD AUTO: 5.8 %
NEUTROPHILS # BLD AUTO: 3.03 K/UL
NEUTROPHILS NFR BLD AUTO: 67.7 %
NONHDLC SERPL-MCNC: 132 MG/DL
PLATELET # BLD AUTO: 212 K/UL
POTASSIUM SERPL-SCNC: 4.1 MMOL/L
PROGEST SERPL-MCNC: 0.3 NG/ML
PROT SERPL-MCNC: 7.7 G/DL
RBC # BLD: 4.77 M/UL
RBC # FLD: 11.9 %
SODIUM SERPL-SCNC: 144 MMOL/L
T3 SERPL-MCNC: 142 NG/DL
T3FREE SERPL-MCNC: 3.6 PG/ML
T4 FREE SERPL-MCNC: 1.1 NG/DL
T4 SERPL-MCNC: 7.7 UG/DL
TIBC SERPL-MCNC: 315 UG/DL
TRIGL SERPL-MCNC: 89 MG/DL
TSH SERPL-ACNC: 0.86 UIU/ML
UIBC SERPL-MCNC: 230 UG/DL
VIT B12 SERPL-MCNC: 1094 PG/ML
WBC # FLD AUTO: 4.48 K/UL

## 2021-04-23 ENCOUNTER — APPOINTMENT (OUTPATIENT)
Dept: FAMILY MEDICINE | Facility: CLINIC | Age: 56
End: 2021-04-23
Payer: COMMERCIAL

## 2021-04-23 VITALS
RESPIRATION RATE: 16 BRPM | HEIGHT: 63.75 IN | DIASTOLIC BLOOD PRESSURE: 73 MMHG | HEART RATE: 77 BPM | BODY MASS INDEX: 17.11 KG/M2 | SYSTOLIC BLOOD PRESSURE: 117 MMHG | OXYGEN SATURATION: 98 % | WEIGHT: 99 LBS

## 2021-04-23 LAB
ALUMINUM SERPL-MCNC: 13 UG/L
MERCURY BLD-MCNC: <1 UG/L

## 2021-04-23 PROCEDURE — 99214 OFFICE O/P EST MOD 30 MIN: CPT

## 2021-04-23 PROCEDURE — 99072 ADDL SUPL MATRL&STAF TM PHE: CPT

## 2021-04-23 NOTE — ASSESSMENT
[FreeTextEntry1] : Lory is a 55 yo F presenting for follow up. She follows up with multiple specialist and takes multiple supplements. \par \par She expresses concern about lipid panel and blood pressure. \par Also complaining of R orbital pressure likely secondary to sinusitis vs allergic rhinitis and episode of "room spinning" likely secondary to benign positional vertigo. \par \par Allergic rhinitis\par - Flonase and zyrtec as needed \par - may consider ENT follow up if no improvement\par \par Benign positional vertigo\par - Meclizine as needed\par \par Reviewed labs with patient\par Lipid panel ok - patient aware of normal triglycerides and high HDL.  LDL goal of 100.\par BP well controlled today - likely elevated at last visit with PCP because of stress at home\par Culture not done with last lab order - reordered urinalysis and culture\par

## 2021-04-23 NOTE — HISTORY OF PRESENT ILLNESS
[FreeTextEntry1] : follow up [de-identified] : 57 yo F presenting for follow up \par Patient states that she is very upset because her cholesterol was high during her last visit with previous PCP. States she was eating a bagel with butter every day the month prior and she thinks that might have been the cause of her abnormal lipid panel. She is also very upset because her blood pressure "Was higher than normal" last time she went to the Doctor. She has recently lost her father. \par 4/16 Moderna COVID 19 1/2 \par 4/6 had an episode of vertigo (room spinning) which was relieved with one dose of meclizine. \par 4/13 felt pain on her R eye. When to eye doctor, diagnosed with allergies and has been using allergy relief drops that help. \par C/o sporadic orbital pressure\par \par

## 2021-04-23 NOTE — HISTORY OF PRESENT ILLNESS
[FreeTextEntry1] : follow up [de-identified] : 55 yo F presenting for follow up \par Patient states that she is very upset because her cholesterol was high during her last visit with previous PCP. States she was eating a bagel with butter every day the month prior and she thinks that might have been the cause of her abnormal lipid panel. She is also very upset because her blood pressure "Was higher than normal" last time she went to the Doctor. She has recently lost her father. \par 4/16 Moderna COVID 19 1/2 \par 4/6 had an episode of vertigo (room spinning) which was relieved with one dose of meclizine. \par 4/13 felt pain on her R eye. When to eye doctor, diagnosed with allergies and has been using allergy relief drops that help. \par C/o sporadic orbital pressure\par \par

## 2021-04-23 NOTE — PHYSICAL EXAM
[Normal] : normal gait, coordination grossly intact, no focal deficits and deep tendon reflexes were 2+ and symmetric No

## 2021-04-23 NOTE — REVIEW OF SYSTEMS
[Pain] : pain [Dryness] : dryness  [Itching] : itching [Earache] : earache [Anxiety] : anxiety [Negative] : Heme/Lymph [Headache] : headache [Discharge] : no discharge [Redness] : no redness [Vision Problems] : no vision problems [Hearing Loss] : no hearing loss [Hoarseness] : no hoarseness [Nasal Discharge] : no nasal discharge [Sore Throat] : no sore throat [Postnasal Drip] : no postnasal drip [FreeTextEntry3] : Pressure like feeling to R eye

## 2021-04-26 LAB
TESTOST BND SERPL-MCNC: 1.2 PG/ML
TESTOST SERPL-MCNC: 29.6 NG/DL

## 2021-04-29 LAB
B HENSELAE IGG SER-ACNC: NORMAL
B HENSELAE IGM SER QL: NORMAL
B QUINTANA IGG SER QL: NORMAL
B QUINTANA IGM SER QL: NORMAL
SHBG SERPL-SCNC: NORMAL
T3REVERSE SERPL-MCNC: 21.8 NG/DL

## 2021-04-30 ENCOUNTER — TRANSCRIPTION ENCOUNTER (OUTPATIENT)
Age: 56
End: 2021-04-30

## 2021-04-30 LAB
APPEARANCE: ABNORMAL
BACTERIA UR CULT: NORMAL
BACTERIA: NEGATIVE
BILIRUBIN URINE: NEGATIVE
BLOOD URINE: NEGATIVE
COLOR: YELLOW
GLUCOSE QUALITATIVE U: NEGATIVE
HYALINE CASTS: 0 /LPF
KETONES URINE: NEGATIVE
LEUKOCYTE ESTERASE URINE: NEGATIVE
MICROSCOPIC-UA: NORMAL
NITRITE URINE: NEGATIVE
PH URINE: 8.5
PROTEIN URINE: NORMAL
RED BLOOD CELLS URINE: 4 /HPF
SPECIFIC GRAVITY URINE: 1.02
SQUAMOUS EPITHELIAL CELLS: 0 /HPF
UROBILINOGEN URINE: NORMAL
WHITE BLOOD CELLS URINE: 1 /HPF

## 2021-05-07 ENCOUNTER — TRANSCRIPTION ENCOUNTER (OUTPATIENT)
Age: 56
End: 2021-05-07

## 2021-05-08 ENCOUNTER — TRANSCRIPTION ENCOUNTER (OUTPATIENT)
Age: 56
End: 2021-05-08

## 2021-05-10 ENCOUNTER — NON-APPOINTMENT (OUTPATIENT)
Age: 56
End: 2021-05-10

## 2021-05-10 ENCOUNTER — APPOINTMENT (OUTPATIENT)
Dept: OPHTHALMOLOGY | Facility: CLINIC | Age: 56
End: 2021-05-10
Payer: COMMERCIAL

## 2021-05-10 PROCEDURE — 99072 ADDL SUPL MATRL&STAF TM PHE: CPT

## 2021-05-10 PROCEDURE — 92014 COMPRE OPH EXAM EST PT 1/>: CPT

## 2021-05-13 ENCOUNTER — APPOINTMENT (OUTPATIENT)
Dept: CARDIOLOGY | Facility: CLINIC | Age: 56
End: 2021-05-13
Payer: COMMERCIAL

## 2021-05-13 ENCOUNTER — NON-APPOINTMENT (OUTPATIENT)
Age: 56
End: 2021-05-13

## 2021-05-13 ENCOUNTER — APPOINTMENT (OUTPATIENT)
Dept: OTOLARYNGOLOGY | Facility: CLINIC | Age: 56
End: 2021-05-13
Payer: COMMERCIAL

## 2021-05-13 VITALS
HEIGHT: 63.75 IN | SYSTOLIC BLOOD PRESSURE: 110 MMHG | BODY MASS INDEX: 17.8 KG/M2 | HEART RATE: 76 BPM | WEIGHT: 103 LBS | DIASTOLIC BLOOD PRESSURE: 70 MMHG | OXYGEN SATURATION: 99 %

## 2021-05-13 VITALS
HEART RATE: 76 BPM | DIASTOLIC BLOOD PRESSURE: 70 MMHG | SYSTOLIC BLOOD PRESSURE: 110 MMHG | WEIGHT: 103 LBS | BODY MASS INDEX: 18.25 KG/M2 | TEMPERATURE: 98.2 F | HEIGHT: 63 IN

## 2021-05-13 PROCEDURE — 93000 ELECTROCARDIOGRAM COMPLETE: CPT

## 2021-05-13 PROCEDURE — 99072 ADDL SUPL MATRL&STAF TM PHE: CPT

## 2021-05-13 PROCEDURE — 31575 DIAGNOSTIC LARYNGOSCOPY: CPT

## 2021-05-13 PROCEDURE — 92557 COMPREHENSIVE HEARING TEST: CPT

## 2021-05-13 PROCEDURE — 99214 OFFICE O/P EST MOD 30 MIN: CPT | Mod: 25

## 2021-05-13 PROCEDURE — 99204 OFFICE O/P NEW MOD 45 MIN: CPT | Mod: 25

## 2021-05-13 PROCEDURE — 92550 TYMPANOMETRY & REFLEX THRESH: CPT

## 2021-05-13 PROCEDURE — 31231 NASAL ENDOSCOPY DX: CPT

## 2021-05-13 NOTE — REVIEW OF SYSTEMS
[Sneezing] : sneezing [Post Nasal Drip] : post nasal drip [Vertigo] : vertigo [Ear Noises] : ear noises [Nose Bleeds] : nose bleeds [Sinus Pressure] : sinus pressure [Hoarseness] : hoarseness [Throat Clearing] : throat clearing [Throat Dryness] : throat dryness [Eye Pain] : eye pain [Cough] : cough [Itching] : itching [Negative] : Heme/Lymph [As Noted in HPI] : as noted in HPI [de-identified] : headaches

## 2021-05-13 NOTE — CONSULT LETTER
[Dear  ___] : Dear  [unfilled], [Consult Letter:] : I had the pleasure of evaluating your patient, [unfilled]. [Consult Closing:] : Thank you very much for allowing me to participate in the care of this patient.  If you have any questions, please do not hesitate to contact me. [Sincerely,] : Sincerely, [FreeTextEntry3] : Ric Cueto M.D.\par

## 2021-05-13 NOTE — ASSESSMENT
[FreeTextEntry1] :  WNL AU with slight left asymmetry 4KHz w type A AU\par \par videostrobe\par FEES\par dietary/lifestyle mod instructions given\par \par CT PNS\par \par f/u after studies are complete\par \par

## 2021-05-13 NOTE — HISTORY OF PRESENT ILLNESS
[de-identified] : 56 yr old anxious female c/o vertigo 4/2021 for about 12 hours, resolved after meclizine and zofran. -tinnitus, aural fullness\par -hx otitis, noise exp, FH, head trauma\par 1 week later had pressure OD, redness of the eye w right facial pressure going to the top of her head. Optho rx pataday.  Eval by Dr Landin rx flonase and zyrtec.\par Had dried blood on the right once, no active bleeding. \par c/o thick mucous in her throat, lots of throat clearing\par +change in her voice\par +poss allergy\par -hx sinusitis, heartburn

## 2021-05-18 ENCOUNTER — APPOINTMENT (OUTPATIENT)
Dept: CT IMAGING | Facility: CLINIC | Age: 56
End: 2021-05-18
Payer: COMMERCIAL

## 2021-05-18 PROCEDURE — 70486 CT MAXILLOFACIAL W/O DYE: CPT

## 2021-05-26 ENCOUNTER — APPOINTMENT (OUTPATIENT)
Dept: OTOLARYNGOLOGY | Facility: CLINIC | Age: 56
End: 2021-05-26
Payer: COMMERCIAL

## 2021-05-26 PROCEDURE — 31579 LARYNGOSCOPY TELESCOPIC: CPT | Mod: GN

## 2021-05-26 PROCEDURE — 92612 ENDOSCOPY SWALLOW (FEES) VID: CPT | Mod: GN

## 2021-05-28 NOTE — HISTORY OF PRESENT ILLNESS
[FreeTextEntry1] : Pt is a 55 y/o F who presents today for f/u.  She states that she is feeling well overall and has no acctive cardia symptoms.  She is concerned about her family history - father AS/CHERRIE (completely occluded)/CAD s/p PCI " maker" at age 85\par COVID+ 05/05/2020 \par COVID vaccine 04/2021 #1 Moderna\par In the past she has seen multiple cardiologists: Dr Kristina Lauren then was following with Dr Melvin Banuelos and most recently Dr Shaina Gordon.  \par Previous ECG shows NSR with poor R wave progression.  \par My MA was present for today's exam\par \par ETT 01/2018: no ischemic changes, exercise time 9min 5 sec\par ETT 04/2019 no ischemic changes, exercise time 9min 30sec\par TTE 03/2016 normal LV function without significant valvular disease\par TTE 04/2019 EF 65-70% without significant valvular disease\par CUS 01/2014 WNL\par \par 04/2019 - 05/2020 - 04/2021:\par A1c = 4.7 - 4.9 - 4.9\par TG 59 - 78 - 89\par Tchol 193 - 228 - 214\par HDL 77 - 78 - 82\par  - 134 - 114\par \par PMH: none\par Smoking status: never\par No drug use or Etoh\par Current exercise: none\par Daily water intake: "not much"\par Daily caffeine intake: soda - few cups\par OTC medications: folic acid, vit D, probiotic, vit C, vit D\par Family hx: father AS/CHERRIE (completely occluded)/CAD s/p PCI " maker" at age 85\par Previous cardiac testing: as mentioned\par Previous hospitalizations: breast augmentation, R knee surgery - no problems with anesthesia\par no children\par

## 2021-05-28 NOTE — DISCUSSION/SUMMARY
[FreeTextEntry1] : Pt is a 56 y/o F who presents today for f/u. \par ECG shows NSR low voltage - unchanged. \par ETT 01/2018: no ischemic changes, exercise time 9min 5 sec\par ETT 04/2019 no ischemic changes, exercise time 9min 30sec\par TTE 03/2016 normal LV function without significant valvular disease\par TTE 04/2019 EF 65-70% without significant valvular disease\par \par check TTE\par check ETT\par check CUS\par Mild elevation in cholesterol numbers - will monitor closely. Advised lifestyle modifications \par VSS\par She is scheduled for next blood work 08/2021\par The described plan was discussed with the pt.  All questions and concerns were addressed to the best of my knowledge. \par

## 2021-06-01 ENCOUNTER — APPOINTMENT (OUTPATIENT)
Dept: OTOLARYNGOLOGY | Facility: CLINIC | Age: 56
End: 2021-06-01
Payer: COMMERCIAL

## 2021-06-01 VITALS
SYSTOLIC BLOOD PRESSURE: 110 MMHG | TEMPERATURE: 97.2 F | DIASTOLIC BLOOD PRESSURE: 75 MMHG | HEART RATE: 80 BPM | WEIGHT: 103 LBS | HEIGHT: 63 IN | BODY MASS INDEX: 18.25 KG/M2

## 2021-06-01 PROCEDURE — 99214 OFFICE O/P EST MOD 30 MIN: CPT

## 2021-06-01 NOTE — ASSESSMENT
[FreeTextEntry1] : trial of Mucinex instead of antihistamine\par continue Flonase\par diet/lifestyle mod for GERD\par prefers not to take more meds

## 2021-06-01 NOTE — REVIEW OF SYSTEMS
[Sneezing] : sneezing [Post Nasal Drip] : post nasal drip [Ear Noises] : ear noises [Nose Bleeds] : nose bleeds [Sinus Pressure] : sinus pressure [As Noted in HPI] : as noted in HPI [Hoarseness] : hoarseness [Throat Clearing] : throat clearing [Throat Dryness] : throat dryness [Eye Pain] : eye pain [Cough] : cough [Itching] : itching [Negative] : Heme/Lymph [Vertigo] : no vertigo [de-identified] : headaches

## 2021-06-07 ENCOUNTER — NON-APPOINTMENT (OUTPATIENT)
Age: 56
End: 2021-06-07

## 2021-06-07 ENCOUNTER — TRANSCRIPTION ENCOUNTER (OUTPATIENT)
Age: 56
End: 2021-06-07

## 2021-06-16 ENCOUNTER — FORM ENCOUNTER (OUTPATIENT)
Age: 56
End: 2021-06-16

## 2021-06-24 ENCOUNTER — NON-APPOINTMENT (OUTPATIENT)
Age: 56
End: 2021-06-24

## 2021-06-28 DIAGNOSIS — Z86.79 PERSONAL HISTORY OF OTHER DISEASES OF THE CIRCULATORY SYSTEM: ICD-10-CM

## 2021-06-29 ENCOUNTER — FORM ENCOUNTER (OUTPATIENT)
Age: 56
End: 2021-06-29

## 2021-08-15 ENCOUNTER — FORM ENCOUNTER (OUTPATIENT)
Age: 56
End: 2021-08-15

## 2021-08-16 ENCOUNTER — TRANSCRIPTION ENCOUNTER (OUTPATIENT)
Age: 56
End: 2021-08-16

## 2021-08-18 ENCOUNTER — FORM ENCOUNTER (OUTPATIENT)
Age: 56
End: 2021-08-18

## 2021-08-19 ENCOUNTER — TRANSCRIPTION ENCOUNTER (OUTPATIENT)
Age: 56
End: 2021-08-19

## 2021-08-22 ENCOUNTER — FORM ENCOUNTER (OUTPATIENT)
Age: 56
End: 2021-08-22

## 2021-08-23 ENCOUNTER — APPOINTMENT (OUTPATIENT)
Dept: RADIOLOGY | Facility: CLINIC | Age: 56
End: 2021-08-23
Payer: COMMERCIAL

## 2021-08-23 ENCOUNTER — LABORATORY RESULT (OUTPATIENT)
Age: 56
End: 2021-08-23

## 2021-08-23 ENCOUNTER — APPOINTMENT (OUTPATIENT)
Dept: RHEUMATOLOGY | Facility: CLINIC | Age: 56
End: 2021-08-23
Payer: COMMERCIAL

## 2021-08-23 ENCOUNTER — OUTPATIENT (OUTPATIENT)
Dept: OUTPATIENT SERVICES | Facility: HOSPITAL | Age: 56
LOS: 1 days | End: 2021-08-23
Payer: COMMERCIAL

## 2021-08-23 VITALS
HEART RATE: 67 BPM | OXYGEN SATURATION: 98 % | TEMPERATURE: 98 F | BODY MASS INDEX: 18.25 KG/M2 | DIASTOLIC BLOOD PRESSURE: 70 MMHG | WEIGHT: 103 LBS | HEIGHT: 63 IN | SYSTOLIC BLOOD PRESSURE: 124 MMHG | RESPIRATION RATE: 17 BRPM

## 2021-08-23 DIAGNOSIS — Z78.9 OTHER SPECIFIED HEALTH STATUS: ICD-10-CM

## 2021-08-23 DIAGNOSIS — U07.1 COVID-19: ICD-10-CM

## 2021-08-23 DIAGNOSIS — M19.90 UNSPECIFIED OSTEOARTHRITIS, UNSPECIFIED SITE: ICD-10-CM

## 2021-08-23 DIAGNOSIS — M25.542 PAIN IN JOINTS OF LEFT HAND: ICD-10-CM

## 2021-08-23 DIAGNOSIS — M15.2 BOUCHARD'S NODES (WITH ARTHROPATHY): ICD-10-CM

## 2021-08-23 DIAGNOSIS — M25.50 PAIN IN UNSPECIFIED JOINT: ICD-10-CM

## 2021-08-23 PROCEDURE — 77085 DXA BONE DENSITY AXL VRT FX: CPT | Mod: 26

## 2021-08-23 PROCEDURE — 73110 X-RAY EXAM OF WRIST: CPT

## 2021-08-23 PROCEDURE — 71046 X-RAY EXAM CHEST 2 VIEWS: CPT | Mod: 26

## 2021-08-23 PROCEDURE — 72050 X-RAY EXAM NECK SPINE 4/5VWS: CPT | Mod: 26

## 2021-08-23 PROCEDURE — 77085 DXA BONE DENSITY AXL VRT FX: CPT

## 2021-08-23 PROCEDURE — 73130 X-RAY EXAM OF HAND: CPT | Mod: 26,50

## 2021-08-23 PROCEDURE — 72050 X-RAY EXAM NECK SPINE 4/5VWS: CPT

## 2021-08-23 PROCEDURE — 71046 X-RAY EXAM CHEST 2 VIEWS: CPT

## 2021-08-23 PROCEDURE — 73110 X-RAY EXAM OF WRIST: CPT | Mod: 26,50

## 2021-08-23 PROCEDURE — 73130 X-RAY EXAM OF HAND: CPT

## 2021-08-23 PROCEDURE — 99205 OFFICE O/P NEW HI 60 MIN: CPT

## 2021-08-24 ENCOUNTER — APPOINTMENT (OUTPATIENT)
Dept: CARDIOLOGY | Facility: CLINIC | Age: 56
End: 2021-08-24
Payer: COMMERCIAL

## 2021-08-24 ENCOUNTER — FORM ENCOUNTER (OUTPATIENT)
Age: 56
End: 2021-08-24

## 2021-08-24 PROCEDURE — 93306 TTE W/DOPPLER COMPLETE: CPT

## 2021-08-25 ENCOUNTER — NON-APPOINTMENT (OUTPATIENT)
Age: 56
End: 2021-08-25

## 2021-08-25 LAB
ALBUMIN SERPL ELPH-MCNC: 4.7 G/DL
ALP BLD-CCNC: 83 U/L
ALT SERPL-CCNC: 11 U/L
ANA SER IF-ACNC: NEGATIVE
ANION GAP SERPL CALC-SCNC: 12 MMOL/L
APPEARANCE: ABNORMAL
AST SERPL-CCNC: 21 U/L
B BURGDOR IGG+IGM SER QL IB: NORMAL
BACTERIA: NEGATIVE
BASOPHILS # BLD AUTO: 0.06 K/UL
BASOPHILS NFR BLD AUTO: 1 %
BILIRUB SERPL-MCNC: 0.3 MG/DL
BILIRUBIN URINE: NEGATIVE
BLOOD URINE: NEGATIVE
BUN SERPL-MCNC: 11 MG/DL
CALCIUM SERPL-MCNC: 9.3 MG/DL
CCP AB SER IA-ACNC: 9 UNITS
CHLORIDE SERPL-SCNC: 101 MMOL/L
CK SERPL-CCNC: 86 U/L
CO2 SERPL-SCNC: 29 MMOL/L
COLOR: NORMAL
CREAT SERPL-MCNC: 0.69 MG/DL
CRP SERPL-MCNC: <3 MG/L
DEPRECATED KAPPA LC FREE/LAMBDA SER: 0.84 RATIO
DSDNA AB SER-ACNC: <12 IU/ML
ENA SS-A AB SER IA-ACNC: <0.2 AL
ENA SS-B AB SER IA-ACNC: <0.2 AL
EOSINOPHIL # BLD AUTO: 0.03 K/UL
EOSINOPHIL NFR BLD AUTO: 0.5 %
ERYTHROCYTE [SEDIMENTATION RATE] IN BLOOD BY WESTERGREN METHOD: 12 MM/HR
GLUCOSE QUALITATIVE U: NEGATIVE
GLUCOSE SERPL-MCNC: 88 MG/DL
HCT VFR BLD CALC: 42.2 %
HGB BLD-MCNC: 13.4 G/DL
HYALINE CASTS: 0 /LPF
IGA SER QL IEP: 344 MG/DL
IGG SER QL IEP: 935 MG/DL
IGM SER QL IEP: 214 MG/DL
IMM GRANULOCYTES NFR BLD AUTO: 0.2 %
KAPPA LC CSF-MCNC: 1.09 MG/DL
KAPPA LC SERPL-MCNC: 0.92 MG/DL
KETONES URINE: NEGATIVE
LDH SERPL-CCNC: 184 U/L
LEUKOCYTE ESTERASE URINE: NEGATIVE
LYMPHOCYTES # BLD AUTO: 1.63 K/UL
LYMPHOCYTES NFR BLD AUTO: 28.4 %
MAGNESIUM SERPL-MCNC: 2.2 MG/DL
MAN DIFF?: NORMAL
MCHC RBC-ENTMCNC: 30.2 PG
MCHC RBC-ENTMCNC: 31.8 GM/DL
MCV RBC AUTO: 95.3 FL
MICROSCOPIC-UA: NORMAL
MONOCYTES # BLD AUTO: 0.38 K/UL
MONOCYTES NFR BLD AUTO: 6.6 %
NEUTROPHILS # BLD AUTO: 3.62 K/UL
NEUTROPHILS NFR BLD AUTO: 63.3 %
NITRITE URINE: NEGATIVE
PH URINE: 8
PHOSPHATE SERPL-MCNC: 3.8 MG/DL
PLATELET # BLD AUTO: 224 K/UL
POTASSIUM SERPL-SCNC: 3.8 MMOL/L
PROT SERPL-MCNC: 7 G/DL
PROTEIN URINE: NORMAL
RBC # BLD: 4.43 M/UL
RBC # FLD: 12.2 %
RED BLOOD CELLS URINE: 1 /HPF
RF+CCP IGG SER-IMP: NEGATIVE
RHEUMATOID FACT SER QL: 14 IU/ML
SODIUM SERPL-SCNC: 142 MMOL/L
SPECIFIC GRAVITY URINE: 1.01
SQUAMOUS EPITHELIAL CELLS: 4 /HPF
T3 SERPL-MCNC: 127 NG/DL
T3RU NFR SERPL: 1.1 TBI
T4 SERPL-MCNC: 6.6 UG/DL
THYROGLOB AB SERPL-ACNC: <20 IU/ML
THYROPEROXIDASE AB SERPL IA-ACNC: 15.7 IU/ML
TSH SERPL-ACNC: 0.92 UIU/ML
URATE SERPL-MCNC: 3 MG/DL
UROBILINOGEN URINE: NORMAL
WBC # FLD AUTO: 5.73 K/UL
WHITE BLOOD CELLS URINE: 2 /HPF

## 2021-08-30 ENCOUNTER — NON-APPOINTMENT (OUTPATIENT)
Age: 56
End: 2021-08-30

## 2021-08-30 PROBLEM — U07.1 COVID-19 VIRUS DETECTED: Status: RESOLVED | Noted: 2020-05-06 | Resolved: 2021-08-30

## 2021-08-30 PROBLEM — Z78.9 DOES NOT USE ILLICIT DRUGS: Status: ACTIVE | Noted: 2021-08-30

## 2021-08-30 LAB
HLA-B27 RELATED AG QL: NEGATIVE
M PROTEIN SPEC IFE-MCNC: NORMAL

## 2021-08-31 ENCOUNTER — NON-APPOINTMENT (OUTPATIENT)
Age: 56
End: 2021-08-31

## 2021-09-08 ENCOUNTER — APPOINTMENT (OUTPATIENT)
Dept: CARDIOLOGY | Facility: CLINIC | Age: 56
End: 2021-09-08

## 2021-09-08 ENCOUNTER — TRANSCRIPTION ENCOUNTER (OUTPATIENT)
Age: 56
End: 2021-09-08

## 2021-09-13 ENCOUNTER — APPOINTMENT (OUTPATIENT)
Dept: MRI IMAGING | Facility: CLINIC | Age: 56
End: 2021-09-13

## 2021-09-14 ENCOUNTER — NON-APPOINTMENT (OUTPATIENT)
Age: 56
End: 2021-09-14

## 2021-09-17 ENCOUNTER — TRANSCRIPTION ENCOUNTER (OUTPATIENT)
Age: 56
End: 2021-09-17

## 2021-09-17 ENCOUNTER — NON-APPOINTMENT (OUTPATIENT)
Age: 56
End: 2021-09-17

## 2021-09-18 ENCOUNTER — OUTPATIENT (OUTPATIENT)
Dept: OUTPATIENT SERVICES | Facility: HOSPITAL | Age: 56
LOS: 1 days | End: 2021-09-18
Payer: COMMERCIAL

## 2021-09-18 ENCOUNTER — APPOINTMENT (OUTPATIENT)
Dept: MRI IMAGING | Facility: CLINIC | Age: 56
End: 2021-09-18
Payer: COMMERCIAL

## 2021-09-18 DIAGNOSIS — M25.50 PAIN IN UNSPECIFIED JOINT: ICD-10-CM

## 2021-09-18 DIAGNOSIS — Z00.8 ENCOUNTER FOR OTHER GENERAL EXAMINATION: ICD-10-CM

## 2021-09-18 PROCEDURE — 73218 MRI UPPER EXTREMITY W/O DYE: CPT

## 2021-09-18 PROCEDURE — 73218 MRI UPPER EXTREMITY W/O DYE: CPT | Mod: 26,LT

## 2021-09-21 ENCOUNTER — TRANSCRIPTION ENCOUNTER (OUTPATIENT)
Age: 56
End: 2021-09-21

## 2021-09-21 ENCOUNTER — NON-APPOINTMENT (OUTPATIENT)
Age: 56
End: 2021-09-21

## 2021-09-21 RX ORDER — IBUPROFEN 400 MG/1
400 TABLET, FILM COATED ORAL
Qty: 100 | Refills: 0 | Status: DISCONTINUED | COMMUNITY
Start: 2021-09-14 | End: 2021-09-21

## 2021-09-21 RX ORDER — ASPIRIN ENTERIC COATED TABLETS 81 MG 81 MG/1
81 TABLET, DELAYED RELEASE ORAL
Qty: 100 | Refills: 0 | Status: DISCONTINUED | COMMUNITY
Start: 2021-09-14 | End: 2021-09-21

## 2021-09-22 ENCOUNTER — TRANSCRIPTION ENCOUNTER (OUTPATIENT)
Age: 56
End: 2021-09-22

## 2021-09-22 DIAGNOSIS — N76.0 ACUTE VAGINITIS: ICD-10-CM

## 2021-09-22 DIAGNOSIS — Z86.018 PERSONAL HISTORY OF OTHER BENIGN NEOPLASM: ICD-10-CM

## 2021-09-22 DIAGNOSIS — Z87.42 PERSONAL HISTORY OF OTHER DISEASES OF THE FEMALE GENITAL TRACT: ICD-10-CM

## 2021-09-22 DIAGNOSIS — Z82.3 FAMILY HISTORY OF STROKE: ICD-10-CM

## 2021-09-22 DIAGNOSIS — B96.89 ACUTE VAGINITIS: ICD-10-CM

## 2021-09-23 ENCOUNTER — APPOINTMENT (OUTPATIENT)
Dept: ORTHOPEDIC SURGERY | Facility: CLINIC | Age: 56
End: 2021-09-23
Payer: COMMERCIAL

## 2021-09-23 ENCOUNTER — APPOINTMENT (OUTPATIENT)
Dept: RHEUMATOLOGY | Facility: CLINIC | Age: 56
End: 2021-09-23

## 2021-09-23 VITALS
SYSTOLIC BLOOD PRESSURE: 115 MMHG | HEART RATE: 84 BPM | HEIGHT: 63 IN | BODY MASS INDEX: 18.25 KG/M2 | DIASTOLIC BLOOD PRESSURE: 74 MMHG | WEIGHT: 103 LBS

## 2021-09-23 PROCEDURE — 99203 OFFICE O/P NEW LOW 30 MIN: CPT

## 2021-09-24 ENCOUNTER — TRANSCRIPTION ENCOUNTER (OUTPATIENT)
Age: 56
End: 2021-09-24

## 2021-09-27 ENCOUNTER — APPOINTMENT (OUTPATIENT)
Dept: CARDIOLOGY | Facility: CLINIC | Age: 56
End: 2021-09-27
Payer: COMMERCIAL

## 2021-09-27 PROCEDURE — 93015 CV STRESS TEST SUPVJ I&R: CPT

## 2021-09-29 ENCOUNTER — NON-APPOINTMENT (OUTPATIENT)
Age: 56
End: 2021-09-29

## 2021-09-30 ENCOUNTER — APPOINTMENT (OUTPATIENT)
Dept: CARDIOLOGY | Facility: CLINIC | Age: 56
End: 2021-09-30

## 2021-10-05 ENCOUNTER — APPOINTMENT (OUTPATIENT)
Dept: NEUROLOGY | Facility: CLINIC | Age: 56
End: 2021-10-05
Payer: COMMERCIAL

## 2021-10-05 VITALS
HEIGHT: 63 IN | HEART RATE: 78 BPM | WEIGHT: 103 LBS | TEMPERATURE: 97.8 F | BODY MASS INDEX: 18.25 KG/M2 | SYSTOLIC BLOOD PRESSURE: 114 MMHG | DIASTOLIC BLOOD PRESSURE: 74 MMHG

## 2021-10-05 PROCEDURE — 99244 OFF/OP CNSLTJ NEW/EST MOD 40: CPT

## 2021-10-05 PROCEDURE — 99204 OFFICE O/P NEW MOD 45 MIN: CPT

## 2021-10-05 NOTE — HISTORY OF PRESENT ILLNESS
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 \par \par Opthalmology and ENT evaluations have been unrevealing.\par \par Right sided discomfort persists with a sensation of pressure around her face, orbit, jaw and into her head.\par This pressure is also accompanied by sharp pangs around the orbit.\par She also has right sided neck and shoulder pain.\par A heating pad at the end of the day provides some relief.\par \par Two years earlier she had an episode of vertigo. She was treated with meclizine. Her symptoms resolved.\par In June 2021 she had another episode of vertigo accompanied by vomiting.\par \par She had a course of Augmentin for possible sinusitis.\par Initially she thought that she felt better but she still is noticing the post nasal drip.\par \par She has a history of bruxism. She is currently using Invisalign and is no longer using a .\par \par She has not had any facial or sinus surgery. She does get cosmetic fillers.\par \par She had covid in April 2020.

## 2021-10-05 NOTE — CONSULT LETTER
[Dear  ___] : Dear  [unfilled], [Consult Letter:] : I had the pleasure of evaluating your patient, [unfilled]. [Please see my note below.] : Please see my note below. [Consult Closing:] : Thank you very much for allowing me to participate in the care of this patient.  If you have any questions, please do not hesitate to contact me. [FreeTextEntry2] : Amparo Landin [FreeTextEntry3] : Sincerely,\par \par \par Juliana Garcia MD\par Diplomate, American Academy of Psychiatry and Neurology\par Board Certified in the Subspecialty of Clinical Neurophysiology\par Board Certified in the Subspecialty of Sleep Medicine\par Board Certified in the Subspecialty of Epilepsy\par

## 2021-10-05 NOTE — DISCUSSION/SUMMARY
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 This has also been accompanied by a sensation of a postnasal drip.\par Workup thus far has been unrevealing.\par Her neurological examination is significant for hypoactive reflexes diffusely. She has no focal findings.\par \par Right sided facial and head pain:\par -Likely an element of TMJ dysfunction. Will get MRI brain.\par -Will also get MRI brain and IACs without contrast to r/o any structural lesion in the setting of persistent unilateral pain.\par -If imaging is unremarkable and symptoms persist, can consider medications, physical therapy, acupuncture.\par \par Hypoactive reflexes\par -May be chronic\par -She has had extensive blood tests with Dr. Landin and Dr. Kleiner. Common causes of neuropathy have been checked.\par \par Anxiety\par -Offered treatment for anxiety. 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

## 2021-10-05 NOTE — PHYSICAL EXAM
[FreeTextEntry1] : Examination:\par Constitutional: normal, no apparent distress\par Eyes: normal conjunctiva b/l, no ptosis, visual fields full\par Respiratory: no respiratory distress, normal effort, normal auscultation\par Cardiovascular: normal rate, rhythm, no murmurs\par Neck: supple, no masses\par Vascular: carotids normal\par Skin: normal color, no rashes\par Psych: anxious\par \par Neurological:\par Memory: normal memory, oriented to person, place, time\par Language intact/no aphasia\par Cranial Nerves: II-XII intact, Pupils equally round and reactive to light, ocular muscles/movements intact, no ptosis, no facial weakness, tongue protrudes normally in the midline, \par Motor: normal tone, no pronator drift, full strength in all four extremities in the proximal and distal muscle groups\par Coordination: Fine motor movements intact, rapid alternating movements intact, finger to nose intact bilaterally\par Sensory: intact to light touch, vibration, joint position sense\par DTRs: symmetric, trace in b/l triceps, 1+ in b/l biceps, 1+ in b/l brachioradialis, trace in bilateral patellars, trace in bilateral Achilles, Babinskis negative bilaterally\par Gait: narrow based, steady\par \par

## 2021-10-05 NOTE — DATA REVIEWED
[de-identified] : CT sinus 5/20/21:\par Sinus CT without contrast\par 1. Well aerated paranasal sinuses, as detailed above.\par 2. No intranasal mass.\par \par MRI left hand 2/21/21:1.  Low-grade grade 2 tear (involving 5% of the width) of the radial collateral ligament of the second MCP joint at its attachment on the head of the second metacarpal with adjacent reactive subcutaneous edema.\par 2.  Chronic-appearing low-grade grade 1 sprain of the ulnar collateral ligament of the proximal interphalangeal joint of the index finger.\par 3.  No discrete mass lesions are identified\par \par \par

## 2021-10-06 ENCOUNTER — TRANSCRIPTION ENCOUNTER (OUTPATIENT)
Age: 56
End: 2021-10-06

## 2021-10-07 ENCOUNTER — TRANSCRIPTION ENCOUNTER (OUTPATIENT)
Age: 56
End: 2021-10-07

## 2021-10-08 ENCOUNTER — TRANSCRIPTION ENCOUNTER (OUTPATIENT)
Age: 56
End: 2021-10-08

## 2021-10-08 DIAGNOSIS — M15.2 BOUCHARD'S NODES (WITH ARTHROPATHY): ICD-10-CM

## 2021-10-08 DIAGNOSIS — Z86.19 PERSONAL HISTORY OF OTHER INFECTIOUS AND PARASITIC DISEASES: ICD-10-CM

## 2021-10-08 DIAGNOSIS — Z13.39 ENCOUNTER FOR SCREENING EXAM FOR OTHER MENTAL HEALTH AND BEHAVIORAL DISORDERS: ICD-10-CM

## 2021-10-08 DIAGNOSIS — Z87.39 PERSONAL HISTORY OF OTHER DISEASES OF THE MUSCULOSKELETAL SYSTEM AND CONNECTIVE TISSUE: ICD-10-CM

## 2021-10-13 ENCOUNTER — TRANSCRIPTION ENCOUNTER (OUTPATIENT)
Age: 56
End: 2021-10-13

## 2021-10-14 ENCOUNTER — TRANSCRIPTION ENCOUNTER (OUTPATIENT)
Age: 56
End: 2021-10-14

## 2021-10-15 ENCOUNTER — TRANSCRIPTION ENCOUNTER (OUTPATIENT)
Age: 56
End: 2021-10-15

## 2021-10-15 ENCOUNTER — NON-APPOINTMENT (OUTPATIENT)
Age: 56
End: 2021-10-15

## 2021-10-18 ENCOUNTER — TRANSCRIPTION ENCOUNTER (OUTPATIENT)
Age: 56
End: 2021-10-18

## 2021-10-20 ENCOUNTER — APPOINTMENT (OUTPATIENT)
Dept: DERMATOLOGY | Facility: CLINIC | Age: 56
End: 2021-10-20
Payer: COMMERCIAL

## 2021-10-20 PROCEDURE — 99213 OFFICE O/P EST LOW 20 MIN: CPT

## 2021-10-21 ENCOUNTER — LABORATORY RESULT (OUTPATIENT)
Age: 56
End: 2021-10-21

## 2021-10-21 ENCOUNTER — TRANSCRIPTION ENCOUNTER (OUTPATIENT)
Age: 56
End: 2021-10-21

## 2021-10-22 ENCOUNTER — TRANSCRIPTION ENCOUNTER (OUTPATIENT)
Age: 56
End: 2021-10-22

## 2021-10-22 LAB
24R-OH-CALCIDIOL SERPL-MCNC: 40.6 PG/ML
ANION GAP SERPL CALC-SCNC: 13 MMOL/L
BUN SERPL-MCNC: 12 MG/DL
CALCIUM SERPL-MCNC: 9.5 MG/DL
CHLORIDE SERPL-SCNC: 103 MMOL/L
CO2 SERPL-SCNC: 28 MMOL/L
CREAT SERPL-MCNC: 0.73 MG/DL
GLUCOSE SERPL-MCNC: 92 MG/DL
POTASSIUM SERPL-SCNC: 4.6 MMOL/L
SODIUM SERPL-SCNC: 144 MMOL/L

## 2021-10-24 ENCOUNTER — NON-APPOINTMENT (OUTPATIENT)
Age: 56
End: 2021-10-24

## 2021-10-26 LAB — BACTERIA UR CULT: NORMAL

## 2021-10-27 ENCOUNTER — TRANSCRIPTION ENCOUNTER (OUTPATIENT)
Age: 56
End: 2021-10-27

## 2021-10-28 ENCOUNTER — TRANSCRIPTION ENCOUNTER (OUTPATIENT)
Age: 56
End: 2021-10-28

## 2021-10-29 ENCOUNTER — TRANSCRIPTION ENCOUNTER (OUTPATIENT)
Age: 56
End: 2021-10-29

## 2021-11-01 ENCOUNTER — APPOINTMENT (OUTPATIENT)
Dept: OBGYN | Facility: CLINIC | Age: 56
End: 2021-11-01
Payer: COMMERCIAL

## 2021-11-01 VITALS
DIASTOLIC BLOOD PRESSURE: 68 MMHG | BODY MASS INDEX: 18.25 KG/M2 | SYSTOLIC BLOOD PRESSURE: 110 MMHG | WEIGHT: 103 LBS | HEIGHT: 63 IN

## 2021-11-01 DIAGNOSIS — N39.0 URINARY TRACT INFECTION, SITE NOT SPECIFIED: ICD-10-CM

## 2021-11-01 DIAGNOSIS — Z11.3 ENCOUNTER FOR SCREENING FOR INFECTIONS WITH A PREDOMINANTLY SEXUAL MODE OF TRANSMISSION: ICD-10-CM

## 2021-11-01 PROCEDURE — 36415 COLL VENOUS BLD VENIPUNCTURE: CPT

## 2021-11-01 PROCEDURE — 99396 PREV VISIT EST AGE 40-64: CPT

## 2021-11-01 RX ORDER — AMOXICILLIN AND CLAVULANATE POTASSIUM 875; 125 MG/1; MG/1
875-125 TABLET, COATED ORAL
Qty: 14 | Refills: 0 | Status: DISCONTINUED | COMMUNITY
Start: 2021-09-24 | End: 2021-11-01

## 2021-11-01 NOTE — PROCEDURE
16 [Cervical Pap Smear] : cervical Pap smear [Liquid Base] : liquid base [GC Chlamydia Culture] : GC Chlamydia Culture [Affirm (Triple Culture)] : Affirm (triple culture) [General Vaginal Culture] : general vaginal culture [Tolerated Well] : the patient tolerated the procedure well [No Complications] : there were no complications

## 2021-11-01 NOTE — DISCUSSION/SUMMARY
[FreeTextEntry1] : PAP done\par GC/Ct\par BD affirm\par STD eval\par UA/UCx\par Breast sono\par pelvic sono

## 2021-11-01 NOTE — PROCEDURE
[Cervical Pap Smear] : cervical Pap smear [Liquid Base] : liquid base [GC Chlamydia Culture] : GC Chlamydia Culture [Affirm (Triple Culture)] : Affirm (triple culture) [General Vaginal Culture] : general vaginal culture [Tolerated Well] : the patient tolerated the procedure well [No Complications] : there were no complications

## 2021-11-02 ENCOUNTER — TRANSCRIPTION ENCOUNTER (OUTPATIENT)
Age: 56
End: 2021-11-02

## 2021-11-02 LAB
APPEARANCE: CLEAR
BACTERIA: NEGATIVE
BILIRUBIN URINE: NEGATIVE
BLOOD URINE: NEGATIVE
COLOR: NORMAL
GLUCOSE QUALITATIVE U: NEGATIVE
HPV HIGH+LOW RISK DNA PNL CVX: NOT DETECTED
HYALINE CASTS: 1 /LPF
KETONES URINE: NEGATIVE
LEUKOCYTE ESTERASE URINE: NEGATIVE
MICROSCOPIC-UA: NORMAL
NITRITE URINE: NEGATIVE
PH URINE: 7
PROTEIN URINE: NEGATIVE
RED BLOOD CELLS URINE: 0 /HPF
SPECIFIC GRAVITY URINE: 1.01
SQUAMOUS EPITHELIAL CELLS: 0 /HPF
UROBILINOGEN URINE: NORMAL
WHITE BLOOD CELLS URINE: 1 /HPF

## 2021-11-03 LAB
BACTERIA UR CULT: NORMAL
CANDIDA VAG CYTO: NOT DETECTED
G VAGINALIS+PREV SP MTYP VAG QL MICRO: NOT DETECTED
HBV SURFACE AG SER QL: NONREACTIVE
HCV AB SER QL: NONREACTIVE
HCV S/CO RATIO: 0.25 S/CO
HIV1+2 AB SPEC QL IA.RAPID: NONREACTIVE
T PALLIDUM AB SER QL IA: NEGATIVE
T VAGINALIS VAG QL WET PREP: NOT DETECTED

## 2021-11-04 ENCOUNTER — TRANSCRIPTION ENCOUNTER (OUTPATIENT)
Age: 56
End: 2021-11-04

## 2021-11-04 LAB
B-HEM STREP SPEC QL CULT: NORMAL
C TRACH RRNA SPEC QL NAA+PROBE: NOT DETECTED
N GONORRHOEA RRNA SPEC QL NAA+PROBE: NOT DETECTED
SOURCE AMPLIFICATION: NORMAL

## 2021-11-05 ENCOUNTER — NON-APPOINTMENT (OUTPATIENT)
Age: 56
End: 2021-11-05

## 2021-11-05 ENCOUNTER — TRANSCRIPTION ENCOUNTER (OUTPATIENT)
Age: 56
End: 2021-11-05

## 2021-11-08 ENCOUNTER — NON-APPOINTMENT (OUTPATIENT)
Age: 56
End: 2021-11-08

## 2021-11-08 ENCOUNTER — TRANSCRIPTION ENCOUNTER (OUTPATIENT)
Age: 56
End: 2021-11-08

## 2021-11-08 LAB — CYTOLOGY CVX/VAG DOC THIN PREP: ABNORMAL

## 2021-11-09 ENCOUNTER — TRANSCRIPTION ENCOUNTER (OUTPATIENT)
Age: 56
End: 2021-11-09

## 2021-11-09 LAB
MYCOPLASMA HOMINIS CULTURE: NEGATIVE
UREAPLASMA CULTURE: NEGATIVE

## 2021-11-10 ENCOUNTER — TRANSCRIPTION ENCOUNTER (OUTPATIENT)
Age: 56
End: 2021-11-10

## 2021-11-11 ENCOUNTER — TRANSCRIPTION ENCOUNTER (OUTPATIENT)
Age: 56
End: 2021-11-11

## 2021-11-15 ENCOUNTER — TRANSCRIPTION ENCOUNTER (OUTPATIENT)
Age: 56
End: 2021-11-15

## 2021-11-16 ENCOUNTER — TRANSCRIPTION ENCOUNTER (OUTPATIENT)
Age: 56
End: 2021-11-16

## 2021-11-17 ENCOUNTER — TRANSCRIPTION ENCOUNTER (OUTPATIENT)
Age: 56
End: 2021-11-17

## 2021-11-18 ENCOUNTER — TRANSCRIPTION ENCOUNTER (OUTPATIENT)
Age: 56
End: 2021-11-18

## 2021-11-19 ENCOUNTER — TRANSCRIPTION ENCOUNTER (OUTPATIENT)
Age: 56
End: 2021-11-19

## 2021-11-22 ENCOUNTER — TRANSCRIPTION ENCOUNTER (OUTPATIENT)
Age: 56
End: 2021-11-22

## 2021-11-23 ENCOUNTER — TRANSCRIPTION ENCOUNTER (OUTPATIENT)
Age: 56
End: 2021-11-23

## 2021-11-23 ENCOUNTER — APPOINTMENT (OUTPATIENT)
Dept: ORTHOPEDIC SURGERY | Facility: CLINIC | Age: 56
End: 2021-11-23
Payer: COMMERCIAL

## 2021-11-23 PROCEDURE — 99441: CPT

## 2021-11-23 RX ORDER — DICLOFENAC SODIUM 1% 10 MG/G
1 GEL TOPICAL
Qty: 1 | Refills: 0 | Status: DISCONTINUED | COMMUNITY
Start: 2021-09-23 | End: 2021-11-23

## 2021-11-27 ENCOUNTER — APPOINTMENT (OUTPATIENT)
Dept: MRI IMAGING | Facility: CLINIC | Age: 56
End: 2021-11-27
Payer: COMMERCIAL

## 2021-11-27 ENCOUNTER — OUTPATIENT (OUTPATIENT)
Dept: OUTPATIENT SERVICES | Facility: HOSPITAL | Age: 56
LOS: 1 days | End: 2021-11-27
Payer: COMMERCIAL

## 2021-11-27 DIAGNOSIS — S69.92XD UNSPECIFIED INJURY OF LEFT WRIST, HAND AND FINGER(S), SUBSEQUENT ENCOUNTER: ICD-10-CM

## 2021-11-27 DIAGNOSIS — Z00.8 ENCOUNTER FOR OTHER GENERAL EXAMINATION: ICD-10-CM

## 2021-11-27 PROCEDURE — 73218 MRI UPPER EXTREMITY W/O DYE: CPT

## 2021-11-27 PROCEDURE — 73218 MRI UPPER EXTREMITY W/O DYE: CPT | Mod: 26,LT

## 2021-11-29 ENCOUNTER — NON-APPOINTMENT (OUTPATIENT)
Age: 56
End: 2021-11-29

## 2021-11-29 ENCOUNTER — APPOINTMENT (OUTPATIENT)
Dept: OTOLARYNGOLOGY | Facility: CLINIC | Age: 56
End: 2021-11-29
Payer: COMMERCIAL

## 2021-11-29 VITALS
HEIGHT: 63 IN | SYSTOLIC BLOOD PRESSURE: 110 MMHG | WEIGHT: 103 LBS | RESPIRATION RATE: 18 BRPM | DIASTOLIC BLOOD PRESSURE: 70 MMHG | BODY MASS INDEX: 18.25 KG/M2 | HEART RATE: 75 BPM

## 2021-11-29 PROCEDURE — 99215 OFFICE O/P EST HI 40 MIN: CPT

## 2021-11-29 PROCEDURE — 99245 OFF/OP CONSLTJ NEW/EST HI 55: CPT

## 2021-11-29 NOTE — HISTORY OF PRESENT ILLNESS
[None] : The patient is currently asymptomatic. [de-identified] : Ms. GODINEZ is a 56 year female with history of thyroid nodules presents reporting that she had her annual thyroid ultrasound which noted a 6mm left thyroid nodule.  Final pathology report was AUS and sent for Thyroseq which was positive for cancer - BRAF mutation.  She noted that she had seen Dr. Antonio many years ago when the nodule was only 3mm. [Neck Mass] : no neck mass [Difficulty Swallowing] : no difficulty swallowing [Painful Swallowing] : no painful swallowing

## 2021-11-29 NOTE — DATA REVIEWED
[de-identified] : Cytopath report ([facility] 11/9/21) L upper pole thyroid FNA = AUS, [place] III.\par \par Thyroseq Report (11/26/21) - L upper pole lesion FNA was [place] III.  Subsequent testing was + for BRAF [id number] mutation. [de-identified] : US neck (11/2/21 [facility]) - Stable R inferior 2.2 cm cystic nodule, R inferior 6 mm complex nodule and R superior cystic 5 mm nodule. All 3 nodules were biopsied in 2015 and were benign.\par \par L upper pole 6 mm nodule with microcalcifications, increased from 2015 when it was 3 mm in size.  This was biopsied in 2015 and atypia was seen but Thyroseq was negative at that time

## 2021-11-29 NOTE — PHYSICAL EXAM
[Midline] : trachea located in midline position [Normal] : no rashes [de-identified] : Soft palpable R thyroid nodule.  L thyroid lesion not palpable.

## 2021-11-29 NOTE — ASSESSMENT
[FreeTextEntry1] : Pt with multiple thyroid nodules.  the L upper pole nodule has a BRAF mutation, which carries a high risk of malignancy.  Pt will need complete excision of the lesion.  She has a strong desire to spare as much thyroid as possible.  Since the lesion is only 6 mm in size a partial thyroid lobectomy may be possible.  \par \par Risks of thyroid surgery were discussed with patient including, but not limited to:\par Possible need for more extensive surgery than anticipated, including total thyroidectomy with central neck dissection and parathyroid reimplantation.  \par Recurrent nerve injury with resulting temporary/permanent hoarseness, and respiratory difficulty\par Parathyroid injury with resulting hypocalcemia and need for long term calcium and Vit. D supplementation\par Rare potential need for tracheostomy\par Tumor recurrence\par Scarring/keloid formation\par Need for additional surgery\par Lifetime need for thyroid hormone replacement\par

## 2021-11-29 NOTE — CONSULT LETTER
[Dear  ___] : Dear  [unfilled], [DrJuanjose  ___] : Dr. JONES [Consult Letter:] : I had the pleasure of evaluating your patient, [unfilled]. [Please see my note below.] : Please see my note below. [Consult Closing:] : Thank you very much for allowing me to participate in the care of this patient.  If you have any questions, please do not hesitate to contact me. [Sincerely,] : Sincerely, [FreeTextEntry2] : Amparo Landin DO ([place], [place])\par  [FreeTextEntry3] : Baron Delacruz MD, FACS\par Chief of Otolaryngology [facility]\par  - Dept. of Otolaryngology\par [facility] [employer]\par \par

## 2021-11-30 ENCOUNTER — APPOINTMENT (OUTPATIENT)
Dept: ORTHOPEDIC SURGERY | Facility: CLINIC | Age: 56
End: 2021-11-30
Payer: COMMERCIAL

## 2021-11-30 ENCOUNTER — APPOINTMENT (OUTPATIENT)
Dept: ORTHOPEDIC SURGERY | Facility: CLINIC | Age: 56
End: 2021-11-30

## 2021-11-30 PROCEDURE — 99443: CPT

## 2021-12-01 ENCOUNTER — NON-APPOINTMENT (OUTPATIENT)
Age: 56
End: 2021-12-01

## 2021-12-01 ENCOUNTER — APPOINTMENT (OUTPATIENT)
Dept: ENDOCRINOLOGY | Facility: CLINIC | Age: 56
End: 2021-12-01
Payer: COMMERCIAL

## 2021-12-01 VITALS
OXYGEN SATURATION: 98 % | HEIGHT: 63 IN | DIASTOLIC BLOOD PRESSURE: 62 MMHG | HEART RATE: 99 BPM | SYSTOLIC BLOOD PRESSURE: 106 MMHG | WEIGHT: 104 LBS | BODY MASS INDEX: 18.43 KG/M2

## 2021-12-01 PROCEDURE — 99204 OFFICE O/P NEW MOD 45 MIN: CPT

## 2021-12-01 RX ORDER — FLUTICASONE PROPIONATE 50 UG/1
50 SPRAY, METERED NASAL
Qty: 1 | Refills: 0 | Status: DISCONTINUED | COMMUNITY
Start: 2021-04-23 | End: 2021-12-01

## 2021-12-01 RX ORDER — CALCIUM CARBONATE 500 MG/1
TABLET, CHEWABLE ORAL
Refills: 0 | Status: DISCONTINUED | COMMUNITY
End: 2021-12-01

## 2021-12-01 RX ORDER — CETIRIZINE HYDROCHLORIDE 10 MG/1
CAPSULE, LIQUID FILLED ORAL
Refills: 0 | Status: DISCONTINUED | COMMUNITY
End: 2021-12-01

## 2021-12-02 ENCOUNTER — APPOINTMENT (OUTPATIENT)
Dept: OTOLARYNGOLOGY | Facility: CLINIC | Age: 56
End: 2021-12-02

## 2021-12-03 ENCOUNTER — NON-APPOINTMENT (OUTPATIENT)
Age: 56
End: 2021-12-03

## 2021-12-03 ENCOUNTER — TRANSCRIPTION ENCOUNTER (OUTPATIENT)
Age: 56
End: 2021-12-03

## 2021-12-06 ENCOUNTER — TRANSCRIPTION ENCOUNTER (OUTPATIENT)
Age: 56
End: 2021-12-06

## 2021-12-07 ENCOUNTER — APPOINTMENT (OUTPATIENT)
Dept: CARDIOLOGY | Facility: CLINIC | Age: 56
End: 2021-12-07
Payer: COMMERCIAL

## 2021-12-07 PROCEDURE — 99443: CPT

## 2021-12-09 ENCOUNTER — TRANSCRIPTION ENCOUNTER (OUTPATIENT)
Age: 56
End: 2021-12-09

## 2021-12-13 ENCOUNTER — APPOINTMENT (OUTPATIENT)
Dept: FAMILY MEDICINE | Facility: CLINIC | Age: 56
End: 2021-12-13
Payer: COMMERCIAL

## 2021-12-13 VITALS
OXYGEN SATURATION: 98 % | BODY MASS INDEX: 18.25 KG/M2 | RESPIRATION RATE: 14 BRPM | HEIGHT: 63 IN | WEIGHT: 103 LBS | TEMPERATURE: 98 F | SYSTOLIC BLOOD PRESSURE: 106 MMHG | HEART RATE: 79 BPM | DIASTOLIC BLOOD PRESSURE: 62 MMHG

## 2021-12-13 PROCEDURE — 99214 OFFICE O/P EST MOD 30 MIN: CPT

## 2021-12-13 NOTE — PHYSICAL EXAM
[No Respiratory Distress] : no respiratory distress  [Normal Rate] : normal rate  [Regular Rhythm] : with a regular rhythm [Normal S1, S2] : normal S1 and S2 [No Murmur] : no murmur heard [No Lymphadenopathy] : no lymphadenopathy [Supple] : supple [Normal] : normal gait, coordination grossly intact, no focal deficits and deep tendon reflexes were 2+ and symmetric [Normal Affect] : the affect was normal [Alert and Oriented x3] : oriented to person, place, and time [Normal Insight/Judgement] : insight and judgment were intact

## 2021-12-17 ENCOUNTER — APPOINTMENT (OUTPATIENT)
Dept: OTOLARYNGOLOGY | Facility: CLINIC | Age: 56
End: 2021-12-17
Payer: COMMERCIAL

## 2021-12-17 VITALS
HEIGHT: 63 IN | WEIGHT: 103 LBS | TEMPERATURE: 98.5 F | OXYGEN SATURATION: 98 % | SYSTOLIC BLOOD PRESSURE: 108 MMHG | HEART RATE: 81 BPM | DIASTOLIC BLOOD PRESSURE: 68 MMHG | BODY MASS INDEX: 18.25 KG/M2

## 2021-12-17 PROCEDURE — 99215 OFFICE O/P EST HI 40 MIN: CPT

## 2021-12-17 NOTE — ASSESSMENT
[FreeTextEntry1] : I had an extensive discussion with patient regarding surgical plan.  We will try to simply remove the tumor with a surrounding cuff of normal tissue.  Pt understands that additional thyroid may be taken if it is necessary, in my judgement, to preserve her recurrent nerve.  Pt also understands that if the malignancy is found to be larger than expected, or if it has spread to regional nodes, that a total thyroid with central compartment dissection may be necessary.  \par \par Additional points of discussion included:\par Visible scar \par Potential need for thyroid hormone supplementation.\par Parathyroid injury with resulting hypocalcemia and need for long term calcium and Vit. D supplementation\par Need for additional surgery\par

## 2021-12-17 NOTE — CONSULT LETTER
[Dear  ___] : Dear  [unfilled], [Courtesy Letter:] : I had the pleasure of seeing your patient, [unfilled], in my office today. [Please see my note below.] : Please see my note below. [Consult Closing:] : Thank you very much for allowing me to participate in the care of this patient.  If you have any questions, please do not hesitate to contact me. [Sincerely,] : Sincerely, [FreeTextEntry2] : Amparo Landin, DO [FreeTextEntry3] : Baron Delacruz MD, FACS\par Chief of Otolaryngology [facility]\par  - Dept. of Otolaryngology\par [facility] [employer]\par \par

## 2021-12-20 ENCOUNTER — TRANSCRIPTION ENCOUNTER (OUTPATIENT)
Age: 56
End: 2021-12-20

## 2021-12-26 ENCOUNTER — TRANSCRIPTION ENCOUNTER (OUTPATIENT)
Age: 56
End: 2021-12-26

## 2021-12-27 ENCOUNTER — OUTPATIENT (OUTPATIENT)
Dept: OUTPATIENT SERVICES | Facility: HOSPITAL | Age: 56
LOS: 1 days | End: 2021-12-27
Payer: SELF-PAY

## 2021-12-27 ENCOUNTER — TRANSCRIPTION ENCOUNTER (OUTPATIENT)
Age: 56
End: 2021-12-27

## 2021-12-27 VITALS
DIASTOLIC BLOOD PRESSURE: 74 MMHG | HEART RATE: 69 BPM | OXYGEN SATURATION: 97 % | TEMPERATURE: 97 F | HEIGHT: 64.5 IN | RESPIRATION RATE: 18 BRPM | SYSTOLIC BLOOD PRESSURE: 127 MMHG | WEIGHT: 111.11 LBS

## 2021-12-27 DIAGNOSIS — Z98.82 BREAST IMPLANT STATUS: Chronic | ICD-10-CM

## 2021-12-27 DIAGNOSIS — D27.1 BENIGN NEOPLASM OF LEFT OVARY: Chronic | ICD-10-CM

## 2021-12-27 DIAGNOSIS — Z20.828 CONTACT WITH AND (SUSPECTED) EXPOSURE TO OTHER VIRAL COMMUNICABLE DISEASES: ICD-10-CM

## 2021-12-27 DIAGNOSIS — C73 MALIGNANT NEOPLASM OF THYROID GLAND: ICD-10-CM

## 2021-12-27 DIAGNOSIS — Z98.890 OTHER SPECIFIED POSTPROCEDURAL STATES: Chronic | ICD-10-CM

## 2021-12-27 LAB
ANION GAP SERPL CALC-SCNC: 7 MMOL/L — SIGNIFICANT CHANGE UP (ref 5–17)
BUN SERPL-MCNC: 14 MG/DL — SIGNIFICANT CHANGE UP (ref 7–23)
CALCIUM SERPL-MCNC: 9.4 MG/DL — SIGNIFICANT CHANGE UP (ref 8.4–10.5)
CHLORIDE SERPL-SCNC: 105 MMOL/L — SIGNIFICANT CHANGE UP (ref 96–108)
CO2 SERPL-SCNC: 33 MMOL/L — HIGH (ref 22–31)
CREAT SERPL-MCNC: 0.76 MG/DL — SIGNIFICANT CHANGE UP (ref 0.5–1.3)
GLUCOSE SERPL-MCNC: 94 MG/DL — SIGNIFICANT CHANGE UP (ref 70–99)
HCT VFR BLD CALC: 42.2 % — SIGNIFICANT CHANGE UP (ref 34.5–45)
HGB BLD-MCNC: 13.6 G/DL — SIGNIFICANT CHANGE UP (ref 11.5–15.5)
MCHC RBC-ENTMCNC: 29.7 PG — SIGNIFICANT CHANGE UP (ref 27–34)
MCHC RBC-ENTMCNC: 32.2 GM/DL — SIGNIFICANT CHANGE UP (ref 32–36)
MCV RBC AUTO: 92.1 FL — SIGNIFICANT CHANGE UP (ref 80–100)
NRBC # BLD: 0 /100 WBCS — SIGNIFICANT CHANGE UP (ref 0–0)
PLATELET # BLD AUTO: 191 K/UL — SIGNIFICANT CHANGE UP (ref 150–400)
POTASSIUM SERPL-MCNC: 4.1 MMOL/L — SIGNIFICANT CHANGE UP (ref 3.5–5.3)
POTASSIUM SERPL-SCNC: 4.1 MMOL/L — SIGNIFICANT CHANGE UP (ref 3.5–5.3)
RBC # BLD: 4.58 M/UL — SIGNIFICANT CHANGE UP (ref 3.8–5.2)
RBC # FLD: 11.9 % — SIGNIFICANT CHANGE UP (ref 10.3–14.5)
SODIUM SERPL-SCNC: 145 MMOL/L — SIGNIFICANT CHANGE UP (ref 135–145)
WBC # BLD: 5.67 K/UL — SIGNIFICANT CHANGE UP (ref 3.8–10.5)
WBC # FLD AUTO: 5.67 K/UL — SIGNIFICANT CHANGE UP (ref 3.8–10.5)

## 2021-12-27 PROCEDURE — 80048 BASIC METABOLIC PNL TOTAL CA: CPT

## 2021-12-27 PROCEDURE — G0463: CPT

## 2021-12-27 PROCEDURE — 85027 COMPLETE CBC AUTOMATED: CPT

## 2021-12-27 PROCEDURE — 36415 COLL VENOUS BLD VENIPUNCTURE: CPT

## 2021-12-27 NOTE — H&P PST ADULT - NSICDXFAMILYHX_GEN_ALL_CORE_FT
FAMILY HISTORY:  Father  Still living? Unknown  FH: aortic stenosis, Age at diagnosis: Age Unknown  FH: CAD (coronary artery disease), Age at diagnosis: Age Unknown

## 2021-12-27 NOTE — H&P PST ADULT - NSANTHOSAYNRD_GEN_A_CORE
neck 12.5 inches/No. EVELYN screening performed.  STOP BANG Legend: 0-2 = LOW Risk; 3-4 = INTERMEDIATE Risk; 5-8 = HIGH Risk

## 2021-12-27 NOTE — H&P PST ADULT - NEGATIVE MUSCULOSKELETAL SYMPTOMS
[FreeTextEntry1] : 60 y/o woman with personal history of left breast cancer s/p bilateral mastectomy with SUNNI flap reconstruction on 3/27/19. She is doing well.  Today we have removed drains # 3 and 4. Return in one week for drain removal.
no arthralgia/no arthritis/no joint swelling

## 2021-12-27 NOTE — ASSESSMENT
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 \par PSTs to be done 12/27 - reviewed and within normal limits\par Medically optimized for procedure

## 2021-12-27 NOTE — H&P PST ADULT - NEGATIVE ENMT SYMPTOMS
no hearing difficulty/no ear pain/no nasal discharge/no nasal obstruction/no post-nasal discharge/no nose bleeds/no throat pain

## 2021-12-27 NOTE — H&P PST ADULT - PROBLEM SELECTOR PLAN 1
Scheduled for left thyroidectomy with Dr Delacruz on 01/05/2021.  CBC, BMP, medical clearance pending.  chest x ray and ECHO/stress on chart.  Pre op instructions given and patient verbalized understanding.  Chlorhexidene wash given with instructions.  To stop all ASA, NSAIDs, vitamins and supplements 1 week prior to procedure.  NPO after midnight  UCG AM of surgery.

## 2021-12-27 NOTE — HISTORY OF PRESENT ILLNESS
[No Pertinent Cardiac History] : no history of aortic stenosis, atrial fibrillation, coronary artery disease, recent myocardial infarction, or implantable device/pacemaker [No Pertinent Pulmonary History] : no history of asthma, COPD, sleep apnea, or smoking [No Adverse Anesthesia Reaction] : no adverse anesthesia reaction in self or family member [(Patient denies any chest pain, claudication, dyspnea on exertion, orthopnea, palpitations or syncope)] : Patient denies any chest pain, claudication, dyspnea on exertion, orthopnea, palpitations or syncope [Excellent (>10 METs)] : Excellent (>10 METs) [Aortic Stenosis] : no aortic stenosis [Atrial Fibrillation] : no atrial fibrillation [Coronary Artery Disease] : no coronary artery disease [Recent Myocardial Infarction] : no recent myocardial infarction [Implantable Device/Pacemaker] : no implantable device/pacemaker [Asthma] : no asthma [COPD] : no COPD [Sleep Apnea] : no sleep apnea [Smoker] : not a smoker [Self] : no previous adverse anesthesia reaction [Chronic Anticoagulation] : no chronic anticoagulation [Chronic Kidney Disease] : no chronic kidney disease [Diabetes] : no diabetes [FreeTextEntry1] : partial thyroidectomy [FreeTextEntry2] : 1/5/22 [FreeTextEntry3] : Dr. Delacruz [FreeTextEntry4] : 57 y/o female presents for preop visit for partial thyroidectomy. Patient states she is feeling anxious about the procedure. Patient has had surgery in the past, no issues with anesthesia. No significant cardiac history. Had a stress test and TTE in 9/21 which were normal. Patient has never been a smoker. Not on any blood thinners. Denies fevers, chills, cough, shortness of breath. Exercises daily with walking, no dyspnea on exertion, no chest pain.

## 2021-12-27 NOTE — PLAN
[FreeTextEntry1] : Thyroid Carcinoma:\par -scheduled for partial thyroidectomy with Dr. Delacruz on 1/5/22\par -patient is low risk. No modifiable risk factors\par -medical optimization pending review of presurgical testing results

## 2021-12-27 NOTE — H&P PST ADULT - HISTORY OF PRESENT ILLNESS
57 y/o female with PMH of COVID-19 ( 4/2020-5/2020 - mild - not hospitalized) and thyroid nodules presents for PST.  C/O left thyroid nodule which was biopsied and due to results resulted in recommendation for surgery.  Scheduled for left thyroid lobectomy with Dr Delacruz on 01/05/2021.   COVID-19 testing scheduled for 01/02/2021 at [facility]  States that she is feeling well today.

## 2021-12-27 NOTE — H&P PST ADULT - NSICDXPASTSURGICALHX_GEN_ALL_CORE_FT
PAST SURGICAL HISTORY:  Cyst, ovary, dermoid, left 20 y/o    H/O breast augmentation 2007    S/P arthroscopic surgery of right knee 16 y/p

## 2021-12-27 NOTE — REVIEW OF SYSTEMS
[Fever] : no fever [Chills] : no chills [Discharge] : no discharge [Vision Problems] : no vision problems [Itching] : no itching [Earache] : no earache [Nasal Discharge] : no nasal discharge [Sore Throat] : no sore throat [Chest Pain] : no chest pain [Palpitations] : no palpitations [Lower Ext Edema] : no lower extremity edema [Shortness Of Breath] : no shortness of breath [Wheezing] : no wheezing [Abdominal Pain] : no abdominal pain [Nausea] : no nausea [Vomiting] : no vomiting [Dysuria] : no dysuria [Joint Pain] : no joint pain [Muscle Pain] : no muscle pain [Skin Rash] : no skin rash [Headache] : no headache [Dizziness] : no dizziness

## 2021-12-27 NOTE — H&P PST ADULT - FALL HARM RISK - UNIVERSAL INTERVENTIONS
Bed in lowest position, wheels locked, appropriate side rails in place/Call bell, personal items and telephone in reach/Instruct patient to call for assistance before getting out of bed or chair/Non-slip footwear when patient is out of bed/[place] to call system/Physically safe environment - no spills, clutter or unnecessary equipment/Purposeful Proactive Rounding/Room/bathroom lighting operational, light cord in reach

## 2022-01-03 LAB — SARS-COV-2 N GENE NPH QL NAA+PROBE: NOT DETECTED

## 2022-01-04 ENCOUNTER — NON-APPOINTMENT (OUTPATIENT)
Age: 57
End: 2022-01-04

## 2022-01-04 PROBLEM — U07.1 COVID-19: Chronic | Status: ACTIVE | Noted: 2021-12-27

## 2022-01-04 PROBLEM — Z86.39 PERSONAL HISTORY OF OTHER ENDOCRINE, NUTRITIONAL AND METABOLIC DISEASE: Chronic | Status: ACTIVE | Noted: 2021-12-27

## 2022-01-05 ENCOUNTER — RESULT REVIEW (OUTPATIENT)
Age: 57
End: 2022-01-05

## 2022-01-05 ENCOUNTER — OUTPATIENT (OUTPATIENT)
Dept: OUTPATIENT SERVICES | Facility: HOSPITAL | Age: 57
LOS: 1 days | Discharge: HOME | End: 2022-01-05
Payer: COMMERCIAL

## 2022-01-05 ENCOUNTER — APPOINTMENT (OUTPATIENT)
Dept: OTOLARYNGOLOGY | Facility: HOSPITAL | Age: 57
End: 2022-01-05

## 2022-01-05 VITALS
WEIGHT: 111.11 LBS | SYSTOLIC BLOOD PRESSURE: 118 MMHG | RESPIRATION RATE: 16 BRPM | HEART RATE: 88 BPM | HEIGHT: 64.5 IN | OXYGEN SATURATION: 96 % | TEMPERATURE: 98 F | DIASTOLIC BLOOD PRESSURE: 70 MMHG

## 2022-01-05 VITALS
DIASTOLIC BLOOD PRESSURE: 70 MMHG | RESPIRATION RATE: 16 BRPM | SYSTOLIC BLOOD PRESSURE: 105 MMHG | OXYGEN SATURATION: 97 % | HEART RATE: 75 BPM

## 2022-01-05 DIAGNOSIS — Z98.890 OTHER SPECIFIED POSTPROCEDURAL STATES: Chronic | ICD-10-CM

## 2022-01-05 DIAGNOSIS — C73 MALIGNANT NEOPLASM OF THYROID GLAND: ICD-10-CM

## 2022-01-05 DIAGNOSIS — D27.1 BENIGN NEOPLASM OF LEFT OVARY: Chronic | ICD-10-CM

## 2022-01-05 DIAGNOSIS — Z98.82 BREAST IMPLANT STATUS: Chronic | ICD-10-CM

## 2022-01-05 PROCEDURE — C1889: CPT

## 2022-01-05 PROCEDURE — 60210 PARTIAL THYROID EXCISION: CPT | Mod: LT

## 2022-01-05 PROCEDURE — 88307 TISSUE EXAM BY PATHOLOGIST: CPT | Mod: 26

## 2022-01-05 PROCEDURE — C9399: CPT

## 2022-01-05 PROCEDURE — 88331 PATH CONSLTJ SURG 1 BLK 1SPC: CPT | Mod: 26

## 2022-01-05 PROCEDURE — 88307 TISSUE EXAM BY PATHOLOGIST: CPT

## 2022-01-05 PROCEDURE — 88331 PATH CONSLTJ SURG 1 BLK 1SPC: CPT

## 2022-01-05 PROCEDURE — 60210 PARTIAL THYROID EXCISION: CPT | Mod: AS,LT

## 2022-01-05 DEVICE — CLIP 24 SMALL TITAN: Type: IMPLANTABLE DEVICE | Status: FUNCTIONAL

## 2022-01-05 DEVICE — HEMOCLIP MED BLUE 6 CARTRIDGE TITANIUM: Type: IMPLANTABLE DEVICE | Status: FUNCTIONAL

## 2022-01-05 RX ORDER — ACETAMINOPHEN 500 MG
650 TABLET ORAL EVERY 6 HOURS
Refills: 0 | Status: DISCONTINUED | OUTPATIENT
Start: 2022-01-05 | End: 2022-01-19

## 2022-01-05 RX ORDER — HYDROMORPHONE HYDROCHLORIDE 2 MG/ML
0.5 INJECTION INTRAMUSCULAR; INTRAVENOUS; SUBCUTANEOUS
Refills: 0 | Status: DISCONTINUED | OUTPATIENT
Start: 2022-01-05 | End: 2022-01-05

## 2022-01-05 RX ORDER — SODIUM CHLORIDE 9 MG/ML
1000 INJECTION, SOLUTION INTRAVENOUS
Refills: 0 | Status: DISCONTINUED | OUTPATIENT
Start: 2022-01-05 | End: 2022-01-05

## 2022-01-05 RX ORDER — ACETAMINOPHEN 500 MG
2 TABLET ORAL
Qty: 0 | Refills: 0 | DISCHARGE
Start: 2022-01-05

## 2022-01-05 RX ORDER — SODIUM CHLORIDE 9 MG/ML
3 INJECTION INTRAMUSCULAR; INTRAVENOUS; SUBCUTANEOUS EVERY 8 HOURS
Refills: 0 | Status: DISCONTINUED | OUTPATIENT
Start: 2022-01-05 | End: 2022-01-19

## 2022-01-05 RX ORDER — OXYCODONE AND ACETAMINOPHEN 5; 325 MG/1; MG/1
1 TABLET ORAL EVERY 4 HOURS
Refills: 0 | Status: DISCONTINUED | OUTPATIENT
Start: 2022-01-05 | End: 2022-01-05

## 2022-01-05 RX ORDER — ONDANSETRON 8 MG/1
4 TABLET, FILM COATED ORAL ONCE
Refills: 0 | Status: DISCONTINUED | OUTPATIENT
Start: 2022-01-05 | End: 2022-01-05

## 2022-01-05 RX ADMIN — SODIUM CHLORIDE 50 MILLILITER(S): 9 INJECTION, SOLUTION INTRAVENOUS at 07:38

## 2022-01-05 NOTE — ASU DISCHARGE PLAN (ADULT/PEDIATRIC) - CARE PROVIDER_API CALL
Juan Walsh (PA)  Physician Assistant Services  [address] [place] [address]  Phone: [phone]  Fax: [phone]  Established Patient  Scheduled Appointment: 01/12/2022 11:00 AM

## 2022-01-05 NOTE — ASU DISCHARGE PLAN (ADULT/PEDIATRIC) - PROVIDER TOKENS
PROVIDER:[TOKEN:[[id number]],SCHEDULEDAPPT:[01/12/2022],SCHEDULEDAPPTTIME:[11:00 AM],ESTABLISHEDPATIENT:[T]]

## 2022-01-05 NOTE — ASU PATIENT PROFILE, ADULT - FALL HARM RISK - UNIVERSAL INTERVENTIONS
Bed in lowest position, wheels locked, appropriate side rails in place/Call bell, personal items and telephone in reach/Instruct patient to call for assistance before getting out of bed or chair/Non-slip footwear when patient is out of bed/[place] to call system/Physically safe environment - no spills, clutter or unnecessary equipment/Purposeful Proactive Rounding/Room/bathroom lighting operational, light cord in reach

## 2022-01-05 NOTE — ASU DISCHARGE PLAN (ADULT/PEDIATRIC) - NS MD DC FALL RISK RISK
For information on Fall & Injury Prevention, visit: [email or web address] OR  [email or web address] OR  https://www.cdc.gov/steadi/patient.html

## 2022-01-05 NOTE — ASU DISCHARGE PLAN (ADULT/PEDIATRIC) - NURSING INSTRUCTIONS
All discharge information reviewed with patient including pain, safety, medication and follow up care . Pt acknowl;edges understanding of discharge instructions. All discharge information reviewed with patient including pain, safety, medication and follow up care . Pt acknowledges understanding of discharge instructions.

## 2022-01-05 NOTE — BRIEF OPERATIVE NOTE - OPERATION/FINDINGS
6 mm encapsulated mass in the L upper pole of the thyroid.  Excised with a 1 cm margin.  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  R side strap muscles were not elevated.    L upper parathyroid was preserved.  L inferior para was not dissected.  Recurrent N preserved.

## 2022-01-07 ENCOUNTER — NON-APPOINTMENT (OUTPATIENT)
Age: 57
End: 2022-01-07

## 2022-01-11 ENCOUNTER — NON-APPOINTMENT (OUTPATIENT)
Age: 57
End: 2022-01-11

## 2022-01-12 ENCOUNTER — APPOINTMENT (OUTPATIENT)
Dept: OTOLARYNGOLOGY | Facility: CLINIC | Age: 57
End: 2022-01-12
Payer: COMMERCIAL

## 2022-01-12 PROCEDURE — 99024 POSTOP FOLLOW-UP VISIT: CPT

## 2022-01-12 PROCEDURE — 31575 DIAGNOSTIC LARYNGOSCOPY: CPT | Mod: NC

## 2022-01-17 ENCOUNTER — TRANSCRIPTION ENCOUNTER (OUTPATIENT)
Age: 57
End: 2022-01-17

## 2022-01-18 NOTE — PROCEDURE
[Unable to Cooperate with Mirror] : patient unable to cooperate with mirror [Hoarseness] : hoarseness not clearly evaluated by indirect laryngoscopy [Topical Lidocaine] : topical lidocaine [Oxymetazoline HCl] : oxymetazoline HCl [Flexible Endoscope] : examined with the flexible endoscope [Serial Number: ___] : Serial Number: [unfilled] [Normal] : posterior cricoid area had healthy pink mucosa in the interarytenoid area and the esophageal inlet [de-identified] : s/p left superior partial thyroidectomy

## 2022-01-18 NOTE — HISTORY OF PRESENT ILLNESS
[de-identified] : Ms. GODINEZ is a 56 year female who presents one week post left superior partial lobectomy for 6mm micropapillary carcinomata.  Final pathology report also noted an incidental 0.3mm micropapillary carcinomata.  She is recovering well and denies any pain, discomfort or dysphagia.  She was concerned that her voice sounds "tired" since yesterday.  She notes that she has a follow up appointment with her endocrinologist, Dr. Love on February 2. [Neck Mass] : no neck mass [Difficulty Swallowing] : no difficulty swallowing [Painful Swallowing] : no painful swallowing

## 2022-01-18 NOTE — REVIEW OF SYSTEMS
[Nasal Congestion] : nasal congestion [As Noted in HPI] : as noted in HPI [Negative] : Heme/Lymph [Swelling Face] : no face swelling

## 2022-01-18 NOTE — CONSULT LETTER
[Dear  ___] : Dear  [unfilled], [Courtesy Letter:] : I had the pleasure of seeing your patient, [unfilled], in my office today. [Please see my note below.] : Please see my note below. [Sincerely,] : Sincerely, [FreeTextEntry2] : Amparo Landin, DO\par  [FreeTextEntry3] : Juan Walsh PA-C, NUHA, DFAAPA\par Sr. Physician Assistant, Otolaryngology at [facility]\par Adjunct Clinical Instructor, Department of Physician Assistant Studies, [facility]\par \par [facility]\par [address]\[place] [address]\par

## 2022-01-18 NOTE — PHYSICAL EXAM
[Nasal Endoscopy Performed] : nasal endoscopy was performed, see procedure section for findings [Normal] : mucosa is normal [Midline] : trachea located in midline position [de-identified] : Wound clean and intact without very minimal swelling superior to the incision, nontender, nonerythematous, no discharge.

## 2022-01-20 ENCOUNTER — TRANSCRIPTION ENCOUNTER (OUTPATIENT)
Age: 57
End: 2022-01-20

## 2022-01-20 ENCOUNTER — APPOINTMENT (OUTPATIENT)
Dept: FAMILY MEDICINE | Facility: CLINIC | Age: 57
End: 2022-01-20

## 2022-01-20 DIAGNOSIS — R07.89 OTHER CHEST PAIN: ICD-10-CM

## 2022-01-20 DIAGNOSIS — Z87.09 PERSONAL HISTORY OF OTHER DISEASES OF THE RESPIRATORY SYSTEM: ICD-10-CM

## 2022-01-20 DIAGNOSIS — Z87.898 PERSONAL HISTORY OF OTHER SPECIFIED CONDITIONS: ICD-10-CM

## 2022-01-21 ENCOUNTER — TRANSCRIPTION ENCOUNTER (OUTPATIENT)
Age: 57
End: 2022-01-21

## 2022-01-24 ENCOUNTER — TRANSCRIPTION ENCOUNTER (OUTPATIENT)
Age: 57
End: 2022-01-24

## 2022-01-24 ENCOUNTER — NON-APPOINTMENT (OUTPATIENT)
Age: 57
End: 2022-01-24

## 2022-01-24 DIAGNOSIS — H01.02B SQUAMOUS BLEPHARITIS RIGHT EYE, UPPER AND LOWER EYELIDS: ICD-10-CM

## 2022-01-24 DIAGNOSIS — H01.02A SQUAMOUS BLEPHARITIS RIGHT EYE, UPPER AND LOWER EYELIDS: ICD-10-CM

## 2022-01-25 ENCOUNTER — TRANSCRIPTION ENCOUNTER (OUTPATIENT)
Age: 57
End: 2022-01-25

## 2022-01-25 ENCOUNTER — NON-APPOINTMENT (OUTPATIENT)
Age: 57
End: 2022-01-25

## 2022-01-27 DIAGNOSIS — Z87.2 PERSONAL HISTORY OF DISEASES OF THE SKIN AND SUBCUTANEOUS TISSUE: ICD-10-CM

## 2022-01-31 ENCOUNTER — APPOINTMENT (OUTPATIENT)
Dept: OTOLARYNGOLOGY | Facility: CLINIC | Age: 57
End: 2022-01-31
Payer: COMMERCIAL

## 2022-01-31 VITALS
TEMPERATURE: 98.6 F | HEART RATE: 82 BPM | SYSTOLIC BLOOD PRESSURE: 106 MMHG | DIASTOLIC BLOOD PRESSURE: 68 MMHG | HEIGHT: 63 IN | WEIGHT: 103 LBS | OXYGEN SATURATION: 98 % | BODY MASS INDEX: 18.25 KG/M2

## 2022-01-31 PROCEDURE — 99024 POSTOP FOLLOW-UP VISIT: CPT

## 2022-01-31 RX ORDER — ONDANSETRON 4 MG/1
4 TABLET ORAL 4 TIMES DAILY
Qty: 6 | Refills: 0 | Status: DISCONTINUED | COMMUNITY
Start: 2022-01-04 | End: 2022-01-31

## 2022-01-31 NOTE — HISTORY OF PRESENT ILLNESS
[de-identified] : Pt here for f/u after her L thyroid mass excision.  Pt doing well, without complaints.

## 2022-01-31 NOTE — ASSESSMENT
[FreeTextEntry1] : Pt doing well postoperatively.  Pt to proceed with her Endocrinology evaluation.  She will f/u if necessary.

## 2022-01-31 NOTE — CONSULT LETTER
[Dear  ___] : Dear  [unfilled], [Courtesy Letter:] : I had the pleasure of seeing your patient, [unfilled], in my office today. [Please see my note below.] : Please see my note below. [Consult Closing:] : Thank you very much for allowing me to participate in the care of this patient.  If you have any questions, please do not hesitate to contact me. [Sincerely,] : Sincerely, [DrJuanjose  ___] : Dr. JONES [FreeTextEntry2] : Amparo Ladnin, DO [FreeTextEntry3] : Baron Delacruz MD, FACS\par Chief of Otolaryngology [facility]\par  - Dept. of Otolaryngology\par [employer]\par \par

## 2022-01-31 NOTE — PHYSICAL EXAM
[Midline] : trachea located in midline position [Normal] : no rashes [de-identified] : Anterior neck surgical wound healing well, without keloid formation

## 2022-02-02 ENCOUNTER — APPOINTMENT (OUTPATIENT)
Dept: ENDOCRINOLOGY | Facility: CLINIC | Age: 57
End: 2022-02-02
Payer: COMMERCIAL

## 2022-02-02 ENCOUNTER — TRANSCRIPTION ENCOUNTER (OUTPATIENT)
Age: 57
End: 2022-02-02

## 2022-02-02 DIAGNOSIS — Z85.850 PERSONAL HISTORY OF MALIGNANT NEOPLASM OF THYROID: ICD-10-CM

## 2022-02-02 DIAGNOSIS — Z87.19 PERSONAL HISTORY OF OTHER DISEASES OF THE DIGESTIVE SYSTEM: ICD-10-CM

## 2022-02-02 PROCEDURE — 99215 OFFICE O/P EST HI 40 MIN: CPT

## 2022-02-03 ENCOUNTER — TRANSCRIPTION ENCOUNTER (OUTPATIENT)
Age: 57
End: 2022-02-03

## 2022-02-04 ENCOUNTER — TRANSCRIPTION ENCOUNTER (OUTPATIENT)
Age: 57
End: 2022-02-04

## 2022-02-07 ENCOUNTER — TRANSCRIPTION ENCOUNTER (OUTPATIENT)
Age: 57
End: 2022-02-07

## 2022-02-07 DIAGNOSIS — Z11.59 ENCOUNTER FOR SCREENING FOR OTHER VIRAL DISEASES: ICD-10-CM

## 2022-02-07 DIAGNOSIS — R80.9 PROTEINURIA, UNSPECIFIED: ICD-10-CM

## 2022-02-07 DIAGNOSIS — Z36.85 ENCOUNTER FOR ANTENATAL SCREENING FOR STREPTOCOCCUS B: ICD-10-CM

## 2022-02-07 DIAGNOSIS — Z11.8 ENCOUNTER FOR SCREENING FOR OTHER INFECTIOUS AND PARASITIC DISEASES: ICD-10-CM

## 2022-02-07 DIAGNOSIS — Z11.4 ENCOUNTER FOR SCREENING FOR HUMAN IMMUNODEFICIENCY VIRUS [HIV]: ICD-10-CM

## 2022-02-07 DIAGNOSIS — R82.90 UNSPECIFIED ABNORMAL FINDINGS IN URINE: ICD-10-CM

## 2022-02-14 ENCOUNTER — NON-APPOINTMENT (OUTPATIENT)
Age: 57
End: 2022-02-14

## 2022-02-14 DIAGNOSIS — Z01.818 ENCOUNTER FOR OTHER PREPROCEDURAL EXAMINATION: ICD-10-CM

## 2022-02-15 PROBLEM — Z01.818 PRE-OP EVALUATION: Status: RESOLVED | Noted: 2021-12-13 | Resolved: 2022-02-15

## 2022-02-16 DIAGNOSIS — Z11.51 ENCOUNTER FOR SCREENING FOR HUMAN PAPILLOMAVIRUS (HPV): ICD-10-CM

## 2022-02-22 ENCOUNTER — TRANSCRIPTION ENCOUNTER (OUTPATIENT)
Age: 57
End: 2022-02-22

## 2022-02-26 ENCOUNTER — LABORATORY RESULT (OUTPATIENT)
Age: 57
End: 2022-02-26

## 2022-02-28 ENCOUNTER — LABORATORY RESULT (OUTPATIENT)
Age: 57
End: 2022-02-28

## 2022-02-28 ENCOUNTER — APPOINTMENT (OUTPATIENT)
Dept: ENDOCRINOLOGY | Facility: CLINIC | Age: 57
End: 2022-02-28
Payer: COMMERCIAL

## 2022-02-28 LAB
25(OH)D3 SERPL-MCNC: 53.3 NG/ML
ALBUMIN SERPL ELPH-MCNC: 4.7 G/DL
ALP BLD-CCNC: 79 U/L
ALT SERPL-CCNC: 9 U/L
ANION GAP SERPL CALC-SCNC: 8 MMOL/L
AST SERPL-CCNC: 20 U/L
BILIRUB SERPL-MCNC: 0.4 MG/DL
BUN SERPL-MCNC: 15 MG/DL
CALCIUM SERPL-MCNC: 9.8 MG/DL
CHLORIDE SERPL-SCNC: 103 MMOL/L
CO2 SERPL-SCNC: 31 MMOL/L
CREAT SERPL-MCNC: 0.7 MG/DL
GLUCOSE SERPL-MCNC: 85 MG/DL
POTASSIUM SERPL-SCNC: 4.3 MMOL/L
PROT SERPL-MCNC: 6.9 G/DL
SODIUM SERPL-SCNC: 142 MMOL/L

## 2022-02-28 PROCEDURE — 36415 COLL VENOUS BLD VENIPUNCTURE: CPT

## 2022-03-01 LAB — T4 SERPL-MCNC: 6.4 UG/DL

## 2022-03-02 DIAGNOSIS — Z86.03 PERSONAL HISTORY OF NEOPLASM OF UNCERTAIN BEHAVIOR: ICD-10-CM

## 2022-03-02 DIAGNOSIS — Z86.018 PERSONAL HISTORY OF OTHER BENIGN NEOPLASM: ICD-10-CM

## 2022-03-03 ENCOUNTER — TRANSCRIPTION ENCOUNTER (OUTPATIENT)
Age: 57
End: 2022-03-03

## 2022-03-21 RX ORDER — PREGABALIN 225 MG/1
2 CAPSULE ORAL
Qty: 0 | Refills: 0 | DISCHARGE

## 2022-03-21 RX ORDER — L.ACIDOPH/B.ANIMALIS/B.LONGUM 15B CELL
1 CAPSULE ORAL
Qty: 0 | Refills: 0 | DISCHARGE

## 2022-03-21 RX ORDER — LANOLIN ALCOHOL/MO/W.PET/CERES
1 CREAM (GRAM) TOPICAL
Qty: 0 | Refills: 0 | DISCHARGE

## 2022-03-21 RX ORDER — ASCORBIC ACID 60 MG
1 TABLET,CHEWABLE ORAL
Qty: 0 | Refills: 0 | DISCHARGE

## 2022-03-21 RX ORDER — CHOLECALCIFEROL (VITAMIN D3) 125 MCG
2 CAPSULE ORAL
Qty: 0 | Refills: 0 | DISCHARGE

## 2022-03-21 RX ORDER — FOLIC ACID 0.8 MG
1 TABLET ORAL
Qty: 0 | Refills: 0 | DISCHARGE

## 2022-03-21 RX ORDER — ASCORBIC ACID 60 MG
2 TABLET,CHEWABLE ORAL
Qty: 0 | Refills: 0 | DISCHARGE

## 2022-03-21 RX ORDER — CHOLECALCIFEROL (VITAMIN D3) 125 MCG
1 CAPSULE ORAL
Qty: 0 | Refills: 0 | DISCHARGE

## 2022-03-21 RX ORDER — FOLIC ACID 0.8 MG
400 TABLET ORAL
Qty: 0 | Refills: 0 | DISCHARGE

## 2022-03-31 ENCOUNTER — TRANSCRIPTION ENCOUNTER (OUTPATIENT)
Age: 57
End: 2022-03-31

## 2022-04-08 ENCOUNTER — TRANSCRIPTION ENCOUNTER (OUTPATIENT)
Age: 57
End: 2022-04-08

## 2022-04-11 ENCOUNTER — TRANSCRIPTION ENCOUNTER (OUTPATIENT)
Age: 57
End: 2022-04-11

## 2022-05-04 ENCOUNTER — APPOINTMENT (OUTPATIENT)
Dept: ENDOCRINOLOGY | Facility: CLINIC | Age: 57
End: 2022-05-04

## 2022-05-09 ENCOUNTER — APPOINTMENT (OUTPATIENT)
Dept: OPHTHALMOLOGY | Facility: CLINIC | Age: 57
End: 2022-05-09
Payer: COMMERCIAL

## 2022-05-09 ENCOUNTER — NON-APPOINTMENT (OUTPATIENT)
Age: 57
End: 2022-05-09

## 2022-05-09 PROCEDURE — 92014 COMPRE OPH EXAM EST PT 1/>: CPT

## 2022-05-10 LAB
T3 SERPL-MCNC: 120 NG/DL
T3FREE SERPL-MCNC: 3.2 PG/ML
T4 FREE SERPL-MCNC: 1.1 NG/DL
T4 SERPL-MCNC: 7.1 UG/DL
TSH SERPL-ACNC: 1.57 UIU/ML

## 2022-05-17 ENCOUNTER — APPOINTMENT (OUTPATIENT)
Dept: ENDOCRINOLOGY | Facility: CLINIC | Age: 57
End: 2022-05-17
Payer: COMMERCIAL

## 2022-05-17 VITALS — WEIGHT: 103 LBS | HEART RATE: 81 BPM | HEIGHT: 63 IN | BODY MASS INDEX: 18.25 KG/M2

## 2022-05-17 VITALS — SYSTOLIC BLOOD PRESSURE: 108 MMHG | DIASTOLIC BLOOD PRESSURE: 68 MMHG

## 2022-05-17 DIAGNOSIS — R79.89 OTHER SPECIFIED ABNORMAL FINDINGS OF BLOOD CHEMISTRY: ICD-10-CM

## 2022-05-17 PROCEDURE — 99214 OFFICE O/P EST MOD 30 MIN: CPT

## 2022-06-10 ENCOUNTER — LABORATORY RESULT (OUTPATIENT)
Age: 57
End: 2022-06-10

## 2022-06-10 ENCOUNTER — TRANSCRIPTION ENCOUNTER (OUTPATIENT)
Age: 57
End: 2022-06-10

## 2022-06-13 ENCOUNTER — TRANSCRIPTION ENCOUNTER (OUTPATIENT)
Age: 57
End: 2022-06-13

## 2022-06-13 LAB
25(OH)D3 SERPL-MCNC: 56.2 NG/ML
ALBUMIN SERPL ELPH-MCNC: 4.8 G/DL
ALP BLD-CCNC: 77 U/L
ALT SERPL-CCNC: 10 U/L
ALUMINUM SERPL-MCNC: 7 UG/L
AMYLASE/CREAT SERPL: 102 U/L
ANION GAP SERPL CALC-SCNC: 14 MMOL/L
APPEARANCE: CLEAR
AST SERPL-CCNC: 18 U/L
BACTERIA UR CULT: NORMAL
BACTERIA: NEGATIVE
BASOPHILS # BLD AUTO: 0.04 K/UL
BASOPHILS NFR BLD AUTO: 0.6 %
BILIRUB SERPL-MCNC: 0.5 MG/DL
BILIRUBIN URINE: NEGATIVE
BLOOD URINE: NEGATIVE
BUN SERPL-MCNC: 13 MG/DL
CALCIUM SERPL-MCNC: 9.7 MG/DL
CHLORIDE SERPL-SCNC: 101 MMOL/L
CHOLEST SERPL-MCNC: 216 MG/DL
CO2 SERPL-SCNC: 27 MMOL/L
COLOR: COLORLESS
CORTIS SERPL-MCNC: 7.4 UG/DL
CREAT SERPL-MCNC: 0.72 MG/DL
CRP SERPL-MCNC: <3 MG/L
EGFR: 98 ML/MIN/1.73M2
EOSINOPHIL # BLD AUTO: 0.02 K/UL
EOSINOPHIL NFR BLD AUTO: 0.3 %
ERYTHROCYTE [SEDIMENTATION RATE] IN BLOOD BY WESTERGREN METHOD: 10 MM/HR
ESTIMATED AVERAGE GLUCOSE: 97 MG/DL
ESTRADIOL SERPL-MCNC: 5 PG/ML
FERRITIN SERPL-MCNC: 51 NG/ML
FOLATE SERPL-MCNC: >20 NG/ML
FSH SERPL-MCNC: 132 IU/L
GLUCOSE QUALITATIVE U: NEGATIVE
GLUCOSE SERPL-MCNC: 91 MG/DL
HBA1C MFR BLD HPLC: 5 %
HCT VFR BLD CALC: 41 %
HDLC SERPL-MCNC: 90 MG/DL
HGB BLD-MCNC: 13.2 G/DL
HYALINE CASTS: 0 /LPF
IMM GRANULOCYTES NFR BLD AUTO: 0.2 %
INSULIN SERPL-MCNC: 5 UU/ML
IRON SATN MFR SERPL: 23 %
IRON SERPL-MCNC: 69 UG/DL
KETONES URINE: NEGATIVE
LDLC SERPL CALC-MCNC: 114 MG/DL
LEUKOCYTE ESTERASE URINE: NEGATIVE
LH SERPL-ACNC: 58.7 IU/L
LPL SERPL-CCNC: 45 U/L
LYMPHOCYTES # BLD AUTO: 1.64 K/UL
LYMPHOCYTES NFR BLD AUTO: 26 %
MAN DIFF?: NORMAL
MCHC RBC-ENTMCNC: 30 PG
MCHC RBC-ENTMCNC: 32.2 GM/DL
MCV RBC AUTO: 93.2 FL
MICROSCOPIC-UA: NORMAL
MONOCYTES # BLD AUTO: 0.33 K/UL
MONOCYTES NFR BLD AUTO: 5.2 %
NEUTROPHILS # BLD AUTO: 4.27 K/UL
NEUTROPHILS NFR BLD AUTO: 67.7 %
NITRITE URINE: NEGATIVE
NONHDLC SERPL-MCNC: 126 MG/DL
PH URINE: 7.5
PLATELET # BLD AUTO: 206 K/UL
POTASSIUM SERPL-SCNC: 3.8 MMOL/L
PROGEST SERPL-MCNC: 0.3 NG/ML
PROT SERPL-MCNC: 7 G/DL
PROTEIN URINE: NEGATIVE
RBC # BLD: 4.4 M/UL
RBC # FLD: 12.3 %
RED BLOOD CELLS URINE: 2 /HPF
SODIUM SERPL-SCNC: 143 MMOL/L
SPECIFIC GRAVITY URINE: 1.01
SQUAMOUS EPITHELIAL CELLS: 0 /HPF
T3 SERPL-MCNC: 122 NG/DL
T3FREE SERPL-MCNC: 3.3 PG/ML
T4 FREE SERPL-MCNC: 1.1 NG/DL
T4 SERPL-MCNC: 6.9 UG/DL
TIBC SERPL-MCNC: 304 UG/DL
TRIGL SERPL-MCNC: 60 MG/DL
TSH SERPL-ACNC: 0.64 UIU/ML
UIBC SERPL-MCNC: 234 UG/DL
UROBILINOGEN URINE: NORMAL
VIT B12 SERPL-MCNC: 1117 PG/ML
WBC # FLD AUTO: 6.31 K/UL
WHITE BLOOD CELLS URINE: 0 /HPF

## 2022-06-14 ENCOUNTER — NON-APPOINTMENT (OUTPATIENT)
Age: 57
End: 2022-06-14

## 2022-06-15 ENCOUNTER — RESULT REVIEW (OUTPATIENT)
Age: 57
End: 2022-06-15

## 2022-06-15 ENCOUNTER — APPOINTMENT (OUTPATIENT)
Dept: DERMATOLOGY | Facility: CLINIC | Age: 57
End: 2022-06-15
Payer: COMMERCIAL

## 2022-06-15 PROCEDURE — 17110 DESTRUCTION B9 LES UP TO 14: CPT

## 2022-06-15 PROCEDURE — 99213 OFFICE O/P EST LOW 20 MIN: CPT | Mod: 25

## 2022-06-15 PROCEDURE — 11102 TANGNTL BX SKIN SINGLE LES: CPT | Mod: 59

## 2022-06-16 LAB
B HENSELAE IGG SER-ACNC: NEGATIVE TITER
B HENSELAE IGM SER QL: NEGATIVE TITER
B QUINTANA IGG SER QL: NEGATIVE TITER
B QUINTANA IGM SER QL: NEGATIVE TITER
DHEA-SULFATE, SERUM: 61 UG/DL
MERCURY BLD-MCNC: 1.6 UG/L
SHBG SERPL-SCNC: 123 NMOL/L
TESTOST BND SERPL-MCNC: 0.6 PG/ML
TESTOSTERONE TOTAL S: 17 NG/DL

## 2022-06-17 ENCOUNTER — TRANSCRIPTION ENCOUNTER (OUTPATIENT)
Age: 57
End: 2022-06-17

## 2022-06-20 LAB — T3REVERSE SERPL-MCNC: 16.7 NG/DL

## 2022-06-27 ENCOUNTER — APPOINTMENT (OUTPATIENT)
Dept: FAMILY MEDICINE | Facility: CLINIC | Age: 57
End: 2022-06-27
Payer: COMMERCIAL

## 2022-06-27 VITALS
SYSTOLIC BLOOD PRESSURE: 120 MMHG | OXYGEN SATURATION: 98 % | TEMPERATURE: 98 F | HEIGHT: 63 IN | RESPIRATION RATE: 16 BRPM | DIASTOLIC BLOOD PRESSURE: 72 MMHG | HEART RATE: 86 BPM

## 2022-06-27 DIAGNOSIS — Z87.09 PERSONAL HISTORY OF OTHER DISEASES OF THE RESPIRATORY SYSTEM: ICD-10-CM

## 2022-06-27 DIAGNOSIS — Z87.898 PERSONAL HISTORY OF OTHER SPECIFIED CONDITIONS: ICD-10-CM

## 2022-06-27 DIAGNOSIS — Z72.821 INADEQUATE SLEEP HYGIENE: ICD-10-CM

## 2022-06-27 DIAGNOSIS — E04.2 NONTOXIC MULTINODULAR GOITER: ICD-10-CM

## 2022-06-27 DIAGNOSIS — K21.9 GASTRO-ESOPHAGEAL REFLUX DISEASE W/OUT ESOPHAGITIS: ICD-10-CM

## 2022-06-27 DIAGNOSIS — R51.9 HEADACHE, UNSPECIFIED: ICD-10-CM

## 2022-06-27 DIAGNOSIS — G50.1 ATYPICAL FACIAL PAIN: ICD-10-CM

## 2022-06-27 DIAGNOSIS — Z86.018 PERSONAL HISTORY OF OTHER BENIGN NEOPLASM: ICD-10-CM

## 2022-06-27 DIAGNOSIS — R74.8 ABNORMAL LEVELS OF OTHER SERUM ENZYMES: ICD-10-CM

## 2022-06-27 DIAGNOSIS — Z87.39 PERSONAL HISTORY OF OTHER DISEASES OF THE MUSCULOSKELETAL SYSTEM AND CONNECTIVE TISSUE: ICD-10-CM

## 2022-06-27 DIAGNOSIS — M79.643 PAIN IN UNSPECIFIED HAND: ICD-10-CM

## 2022-06-27 DIAGNOSIS — Z87.2 PERSONAL HISTORY OF DISEASES OF THE SKIN AND SUBCUTANEOUS TISSUE: ICD-10-CM

## 2022-06-27 DIAGNOSIS — R79.89 OTHER SPECIFIED ABNORMAL FINDINGS OF BLOOD CHEMISTRY: ICD-10-CM

## 2022-06-27 DIAGNOSIS — R49.0 DYSPHONIA: ICD-10-CM

## 2022-06-27 DIAGNOSIS — R13.14 DYSPHAGIA, PHARYNGOESOPHAGEAL PHASE: ICD-10-CM

## 2022-06-27 DIAGNOSIS — S69.92XD UNSPECIFIED INJURY OF LEFT WRIST, HAND AND FINGER(S), SUBSEQUENT ENCOUNTER: ICD-10-CM

## 2022-06-27 DIAGNOSIS — H90.42 SENSORINEURAL HEARING LOSS, UNILATERAL, LEFT EAR, WITH UNRESTRICTED HEARING ON THE CONTRALATERAL SIDE: ICD-10-CM

## 2022-06-27 DIAGNOSIS — Z86.39 PERSONAL HISTORY OF OTHER ENDOCRINE, NUTRITIONAL AND METABOLIC DISEASE: ICD-10-CM

## 2022-06-27 DIAGNOSIS — M25.542 PAIN IN JOINTS OF LEFT HAND: ICD-10-CM

## 2022-06-27 DIAGNOSIS — L73.8 OTHER SPECIFIED FOLLICULAR DISORDERS: ICD-10-CM

## 2022-06-27 DIAGNOSIS — L82.0 INFLAMED SEBORRHEIC KERATOSIS: ICD-10-CM

## 2022-06-27 DIAGNOSIS — Z00.00 ENCOUNTER FOR GENERAL ADULT MEDICAL EXAMINATION W/OUT ABNORMAL FINDINGS: ICD-10-CM

## 2022-06-27 PROCEDURE — 99396 PREV VISIT EST AGE 40-64: CPT

## 2022-06-27 RX ORDER — ADHESIVE TAPE 3"X 2.3 YD
50 MCG TAPE, NON-MEDICATED TOPICAL
Qty: 90 | Refills: 0 | Status: ACTIVE | COMMUNITY

## 2022-06-27 NOTE — HEALTH RISK ASSESSMENT
[Very Good] : ~his/her~  mood as very good [Never] : Never [No] : In the past 12 months have you used drugs other than those required for medical reasons? No [No falls in past year] : Patient reported no falls in the past year [0] : 2) Feeling down, depressed, or hopeless: Not at all (0) [PHQ-2 Negative - No further assessment needed] : PHQ-2 Negative - No further assessment needed [Patient reported PAP Smear was normal] : Patient reported PAP Smear was normal [Patient reported bone density results were abnormal] : Patient reported bone density results were abnormal [Patient reported colonoscopy was normal] : Patient reported colonoscopy was normal [None] : None [Employed] : employed [Feels Safe at Home] : Feels safe at home [Fully functional (bathing, dressing, toileting, transferring, walking, feeding)] : Fully functional (bathing, dressing, toileting, transferring, walking, feeding) [Fully functional (using the telephone, shopping, preparing meals, housekeeping, doing laundry, using] : Fully functional and needs no help or supervision to perform IADLs (using the telephone, shopping, preparing meals, housekeeping, doing laundry, using transportation, managing medications and managing finances) [Smoke Detector] : smoke detector [Carbon Monoxide Detector] : carbon monoxide detector [Seat Belt] :  uses seat belt [With Patient/Caregiver] : , with patient/caregiver [Audit-CScore] : 0 [[id number]] : 0 [Change in mental status noted] : No change in mental status noted [Language] : denies difficulty with language [Behavior] : denies difficulty with behavior [Reports changes in hearing] : Reports no changes in hearing [Reports changes in vision] : Reports no changes in vision [Reports changes in dental health] : Reports no changes in dental health [MammogramComments] : breast MRI 9/2021 - normal [PapSmearDate] : 11/2/2021 [BoneDensityDate] : 8/2021 [BoneDensityComments] : osteopenia [ColonoscopyDate] : 5/2017 [AdvancecareDate] : 6/2022 [FreeTextEntry4] : unsure at this time for a health care proxy - she will think about it

## 2022-06-27 NOTE — ASSESSMENT
[FreeTextEntry1] : Health maintenance\par - labs reviewed\par - up to date with maintenance\par - EKGs are done with cardiologist\par - will return in one year for physical

## 2022-06-27 NOTE — HISTORY OF PRESENT ILLNESS
[FreeTextEntry1] : cpe [de-identified] : Patient is here today for a full physical. \par Overall she is doing well. \par She is s/p partial thyroidectomy. (left upper lobe removed)\par She had blood work prior to visit that she would like to discuss today. \par Patient has increased her exercise.  She has been walking 2 miles about 5 times per week.

## 2022-06-27 NOTE — PHYSICAL EXAM
[No Acute Distress] : no acute distress [Well Nourished] : well nourished [Well Developed] : well developed [Normal Sclera/Conjunctiva] : normal sclera/conjunctiva [PERRL] : pupils equal round and reactive to light [Normal Outer Ear/Nose] : the outer ears and nose were normal in appearance [Normal Oropharynx] : the oropharynx was normal [No Lymphadenopathy] : no lymphadenopathy [Supple] : supple [No Respiratory Distress] : no respiratory distress  [No Accessory Muscle Use] : no accessory muscle use [Clear to Auscultation] : lungs were clear to auscultation bilaterally [Normal Rate] : normal rate  [Regular Rhythm] : with a regular rhythm [Normal S1, S2] : normal S1 and S2 [No Edema] : there was no peripheral edema [Soft] : abdomen soft [Non Tender] : non-tender [Non-distended] : non-distended [Normal Bowel Sounds] : normal bowel sounds [No Spinal Tenderness] : no spinal tenderness [Grossly Normal Strength/Tone] : grossly normal strength/tone [No Rash] : no rash [No Focal Deficits] : no focal deficits [Normal Gait] : normal gait [Normal Affect] : the affect was normal [Alert and Oriented x3] : oriented to person, place, and time [Normal Insight/Judgement] : insight and judgment were intact

## 2022-07-01 ENCOUNTER — TRANSCRIPTION ENCOUNTER (OUTPATIENT)
Age: 57
End: 2022-07-01

## 2022-07-08 ENCOUNTER — TRANSCRIPTION ENCOUNTER (OUTPATIENT)
Age: 57
End: 2022-07-08

## 2022-07-25 ENCOUNTER — TRANSCRIPTION ENCOUNTER (OUTPATIENT)
Age: 57
End: 2022-07-25

## 2022-07-25 DIAGNOSIS — N28.1 CYST OF KIDNEY, ACQUIRED: ICD-10-CM

## 2022-07-25 NOTE — H&P PST ADULT - ENERGY EXPENDITURE (METS)
[facility]  Phone: [phone]       PATIENT NAME: Ratna Amin   : 1976    AGE: 46 y.o. DATE: 2022    MRN: [id number]        Reason for Visit / Chief Complaint:  Sinus Problem (Patient is here today with sinus problems and headaches. Patient stated that she was has had a headache for about 3 days with no relief of pain. Patient stated that she was diagnose  with fibromyalgia and it could be symptoms of it and stated that  she took a covid test this morning and it was neg.  )     Subjective:     HPI:  46 yr old aimee presents to the office for sinus congestion and drainage and Ha x 3 days  Hx of fibromyalgia   Took covid test at home today and it was neg   Reports HA is front of head- constant - aching - taking multiple otc without relief     Review of Systems:    Pertinent items are above noted in HPI.    Review of patient's allergies indicates:   Allergen Reactions    Penicillins         Med List:  Current Outpatient Medications on File Prior to Visit   Medication Sig Dispense Refill    cyclobenzaprine (FLEXERIL) 10 MG tablet Take 10 mg by mouth.      albuterol (VENTOLIN HFA) 90 mcg/actuation inhaler Inhale 2 puffs into the lungs every 6 (six) hours as needed for Wheezing. Rescue 18 g 0    citalopram (CELEXA) 20 MG tablet Take 20 mg by mouth.      dexchlorphen-phenylephrine-DM (POLYTUSSIN DM) 1-5-10 mg/5 mL Syrp Take 5 mLs by mouth 3 (three) times daily as needed (for COUGH and CONGESTION). 120 mL 0    dextroamphetamine-amphetamine 30 mg Tab Take 30 mg by mouth.      lamoTRIgine (LAMICTAL) 200 MG tablet Take 200 mg by mouth.      levoFLOXacin (LEVAQUIN) 500 MG tablet Take 1 tablet (500 mg total) by mouth once daily. 7 tablet 0    predniSONE (DELTASONE) 20 MG tablet Take 1 tablet (20 mg total) by mouth 2 (two) times daily. 14 tablet 0     No current facility-administered medications on file prior to visit.  "      Medical/Social/Family History:  Past Medical History:   Diagnosis Date    Bipolar disorder       Social History     Tobacco Use   Smoking Status Current Every Day Smoker    Packs/day: 1.00    Types: Cigarettes   Smokeless Tobacco Never Used      Social History     Substance and Sexual Activity   Alcohol Use Not Currently       Family History   Problem Relation Age of Onset    Diabetes Mother     Cancer Mother     Cancer Father     Mental illness Father     Hypertension Father       Past Surgical History:   Procedure Laterality Date    CHOLECYSTECTOMY      HYSTERECTOMY      TUBAL LIGATION          Objective:      Vitals:    07/25/22 0831   BP: 127/82   BP Location: Left arm   Patient Position: Sitting   Pulse: 78   Resp: 16   Temp: 98 °F (36.7 °C)   SpO2: 98%   Weight: 64 kg (141 lb)   Height: 5' 9" (1.753 m)     Body mass index is 20.82 kg/m².     Physical Exam:    General: well developed, well nourished    Head: normocephalic, atraumatic    Eyes: conjunctivae/corneas clear. PERRL.    Mouth/ENT: ENT exam normal, no neck nodes or sinus tendernessmucous membranes moist, pharynx normal without lesions Uvula is midline.     Neck: supple, symmetrical, trachea midline, no adenopathy and thyroid: not enlarged, symmetric, no tenderness/mass/nodules    Respiratory: unlabored respirations, no intercostal retractions or accessory muscle use, clear to auscultation without rales or wheezes    Cardiovascular: normal rate and regular rhythm, S1 and S2 normal, no murmurs noted    Abdomen: soft, nontender    Musculoskeletal: negative; full range of motion, no tenderness, palpable spasm or pain on motion    Lymphadenopathy: No cervical or supraclavicular adenopathy    Neurological: normal without focal findings and mental status, speech normal, alert and oriented x3    Skin: Skin color, texture, turgor normal. No rashes or lesions    Psych: no auditory or visual hallucinations, no anxiety, no depression/worrying alot "       Assessment:          ICD-10-CM ICD-9-CM   1. Other migraine without status migrainosus, not intractable  G43.809 346.80   2. Cough with exposure to COVID-19 virus  R05.8 786.2    Z20.822 V01.79   3. Sinus congestion  R09.81 478.19        Plan:       Other migraine without status migrainosus, not intractable  -     ketorolac injection 60 mg  -     dexamethasone injection 4 mg  -     methylPREDNISolone acetate injection 40 mg    Cough with exposure to COVID-19 virus  -     POCT SARS-COV2 (COVID) with Flu Antigen    Sinus congestion  -     dexamethasone injection 4 mg  -     methylPREDNISolone acetate injection 40 mg        Current Outpatient Medications:     cyclobenzaprine (FLEXERIL) 10 MG tablet, Take 10 mg by mouth., Disp: , Rfl:     albuterol (VENTOLIN HFA) 90 mcg/actuation inhaler, Inhale 2 puffs into the lungs every 6 (six) hours as needed for Wheezing. Rescue, Disp: 18 g, Rfl: 0    citalopram (CELEXA) 20 MG tablet, Take 20 mg by mouth., Disp: , Rfl:     dexchlorphen-phenylephrine-DM (POLYTUSSIN DM) 1-5-10 mg/5 mL Syrp, Take 5 mLs by mouth 3 (three) times daily as needed (for COUGH and CONGESTION)., Disp: 120 mL, Rfl: 0    dextroamphetamine-amphetamine 30 mg Tab, Take 30 mg by mouth., Disp: , Rfl:     lamoTRIgine (LAMICTAL) 200 MG tablet, Take 200 mg by mouth., Disp: , Rfl:     levoFLOXacin (LEVAQUIN) 500 MG tablet, Take 1 tablet (500 mg total) by mouth once daily., Disp: 7 tablet, Rfl: 0    predniSONE (DELTASONE) 20 MG tablet, Take 1 tablet (20 mg total) by mouth 2 (two) times daily., Disp: 14 tablet, Rfl: 0    Current Facility-Administered Medications:     dexamethasone injection 4 mg, 4 mg, Intramuscular, 1 time in Clinic/HOD, [facility]    ketorolac injection 60 mg, 60 mg, Intramuscular, 1 time in Clinic/HOD, [facility]    methylPREDNISolone acetate injection 40 mg, 40 mg, Intramuscular, 1 time in Clinic/HOD, [facility]      New & refilled meds:  Requested  Prescriptions      No prescriptions requested or ordered in this encounter     Treatment Recommendations:    Orders and follow up as documented in patient record.  mucinext otc r/t congestion   Return to clinic as needed.    Signature: KAMILA Iglesias     6 mets

## 2022-07-28 ENCOUNTER — TRANSCRIPTION ENCOUNTER (OUTPATIENT)
Age: 57
End: 2022-07-28

## 2022-08-03 ENCOUNTER — APPOINTMENT (OUTPATIENT)
Dept: DERMATOLOGY | Facility: CLINIC | Age: 57
End: 2022-08-03

## 2022-08-03 ENCOUNTER — RESULT REVIEW (OUTPATIENT)
Age: 57
End: 2022-08-03

## 2022-08-03 PROCEDURE — 99213 OFFICE O/P EST LOW 20 MIN: CPT | Mod: 25

## 2022-08-03 PROCEDURE — 11103 TANGNTL BX SKIN EA SEP/ADDL: CPT

## 2022-08-03 PROCEDURE — 11104 PUNCH BX SKIN SINGLE LESION: CPT

## 2022-08-04 ENCOUNTER — APPOINTMENT (OUTPATIENT)
Dept: ENDOCRINOLOGY | Facility: CLINIC | Age: 57
End: 2022-08-04

## 2022-08-04 PROCEDURE — 36415 COLL VENOUS BLD VENIPUNCTURE: CPT

## 2022-08-11 ENCOUNTER — APPOINTMENT (OUTPATIENT)
Dept: DERMATOLOGY | Facility: CLINIC | Age: 57
End: 2022-08-11

## 2022-08-11 PROCEDURE — ZZZZZ: CPT

## 2022-08-18 ENCOUNTER — LABORATORY RESULT (OUTPATIENT)
Age: 57
End: 2022-08-18

## 2022-08-18 ENCOUNTER — APPOINTMENT (OUTPATIENT)
Dept: ENDOCRINOLOGY | Facility: CLINIC | Age: 57
End: 2022-08-18

## 2022-08-19 ENCOUNTER — TRANSCRIPTION ENCOUNTER (OUTPATIENT)
Age: 57
End: 2022-08-19

## 2022-08-19 LAB
T3 SERPL-MCNC: 130 NG/DL
T3FREE SERPL-MCNC: 3.28 PG/ML
T4 FREE SERPL-MCNC: 1 NG/DL
T4 SERPL-MCNC: 6.8 UG/DL
THYROGLOB AB SERPL-ACNC: NORMAL
THYROGLOB SERPL-MCNC: NORMAL
TSH SERPL-ACNC: 1.02 UIU/ML

## 2022-08-22 LAB
THYROGLOB AB SERPL-ACNC: <20 IU/ML
THYROGLOB SERPL-MCNC: 24.4 NG/ML

## 2022-08-29 ENCOUNTER — NON-APPOINTMENT (OUTPATIENT)
Age: 57
End: 2022-08-29

## 2022-08-29 LAB
T3 SERPL-MCNC: NORMAL
T3FREE SERPL-MCNC: NORMAL
T4 FREE SERPL-MCNC: NORMAL
T4 SERPL-MCNC: NORMAL
TSH SERPL-ACNC: NORMAL

## 2022-08-30 ENCOUNTER — APPOINTMENT (OUTPATIENT)
Dept: ENDOCRINOLOGY | Facility: CLINIC | Age: 57
End: 2022-08-30

## 2022-08-30 VITALS — DIASTOLIC BLOOD PRESSURE: 60 MMHG | SYSTOLIC BLOOD PRESSURE: 110 MMHG

## 2022-08-30 PROCEDURE — 99214 OFFICE O/P EST MOD 30 MIN: CPT

## 2022-09-06 ENCOUNTER — TRANSCRIPTION ENCOUNTER (OUTPATIENT)
Age: 57
End: 2022-09-06

## 2022-09-13 ENCOUNTER — TRANSCRIPTION ENCOUNTER (OUTPATIENT)
Age: 57
End: 2022-09-13

## 2022-09-16 ENCOUNTER — TRANSCRIPTION ENCOUNTER (OUTPATIENT)
Age: 57
End: 2022-09-16

## 2022-10-31 ENCOUNTER — TRANSCRIPTION ENCOUNTER (OUTPATIENT)
Age: 57
End: 2022-10-31

## 2022-11-01 ENCOUNTER — APPOINTMENT (OUTPATIENT)
Dept: OBGYN | Facility: CLINIC | Age: 57
End: 2022-11-01

## 2022-11-01 VITALS
SYSTOLIC BLOOD PRESSURE: 112 MMHG | WEIGHT: 103 LBS | DIASTOLIC BLOOD PRESSURE: 82 MMHG | BODY MASS INDEX: 18.25 KG/M2 | HEIGHT: 63 IN

## 2022-11-01 PROCEDURE — 99396 PREV VISIT EST AGE 40-64: CPT

## 2022-11-01 NOTE — PLAN
[FreeTextEntry1] : GABRIELLE GODINEZ is an 57 year old G0 pt presents for annual GYN exam. \par \par 1.HCM\par -PAP done today\par -BSE \par -rx  breast sono\par -rx pelvic sono\par -GC/CT, BD affirm, UCx, UA cultures done today\par -STD screen done today\par -Colon screening discussed\par \par RTO in 1 year for annual.

## 2022-11-01 NOTE — HISTORY OF PRESENT ILLNESS
[FreeTextEntry1] : GABRIELLE GODINEZ is an 57 year old G0 pt presents for annual GYN exam. Pt has no further complaints.\par \par GYNHx: h/o STDs (in her early 20s), h/o ovarian cysts, h/o abnormal PAPs, left dermoid (2004)\par PSHx: Breast implants, knee surgery\par NKDA\par Meds: calcium 600 mg 1xd, Folic Acid 400 mg 1xd, Melatonin 10 mg 1xd at night, Vit C 500 mg 1xd, Vit D3 1000 mg 1xd, Probiotic B10, Blz 100 mg 1xd\par  [Mammogramdate] : 08/04/19   [TextBox_19] : BIRADS-3 [BreastSonogramDate] : 08/04/19    [TextBox_25] : BIRADS-3 [PapSmeardate] : 11/01/21 [TextBox_31] : neg [BoneDensityDate] : 08/30/21 [TextBox_37] : Osteopenia

## 2022-11-02 LAB
APPEARANCE: CLEAR
BACTERIA UR CULT: NORMAL
BACTERIA: NEGATIVE
BILIRUBIN URINE: NEGATIVE
BLOOD URINE: NEGATIVE
C TRACH RRNA SPEC QL NAA+PROBE: NOT DETECTED
CANDIDA VAG CYTO: NOT DETECTED
COLOR: NORMAL
G VAGINALIS+PREV SP MTYP VAG QL MICRO: NOT DETECTED
GLUCOSE QUALITATIVE U: NEGATIVE
HBV SURFACE AG SER QL: NONREACTIVE
HCV AB SER QL: NONREACTIVE
HCV S/CO RATIO: 0.09 S/CO
HPV HIGH+LOW RISK DNA PNL CVX: NOT DETECTED
HYALINE CASTS: 0 /LPF
KETONES URINE: NEGATIVE
LEUKOCYTE ESTERASE URINE: NEGATIVE
MICROSCOPIC-UA: NORMAL
N GONORRHOEA RRNA SPEC QL NAA+PROBE: NOT DETECTED
NITRITE URINE: NEGATIVE
PH URINE: 7.5
PROTEIN URINE: NEGATIVE
RED BLOOD CELLS URINE: 2 /HPF
SOURCE AMPLIFICATION: NORMAL
SPECIFIC GRAVITY URINE: 1.01
SQUAMOUS EPITHELIAL CELLS: 0 /HPF
T PALLIDUM AB SER QL IA: NEGATIVE
T VAGINALIS VAG QL WET PREP: NOT DETECTED
UROBILINOGEN URINE: NORMAL
WHITE BLOOD CELLS URINE: 0 /HPF

## 2022-11-03 ENCOUNTER — APPOINTMENT (OUTPATIENT)
Dept: DERMATOLOGY | Facility: CLINIC | Age: 57
End: 2022-11-03

## 2022-11-03 LAB — HIV1+2 AB SPEC QL IA.RAPID: NONREACTIVE

## 2022-11-03 PROCEDURE — 99213 OFFICE O/P EST LOW 20 MIN: CPT

## 2022-11-04 ENCOUNTER — TRANSCRIPTION ENCOUNTER (OUTPATIENT)
Age: 57
End: 2022-11-04

## 2022-11-04 LAB
ALBUMIN SERPL ELPH-MCNC: 4.4 G/DL
ALP BLD-CCNC: 91 U/L
ALT SERPL-CCNC: 9 U/L
ANION GAP SERPL CALC-SCNC: 15 MMOL/L
AST SERPL-CCNC: 21 U/L
B-HEM STREP SPEC QL CULT: NORMAL
BASOPHILS # BLD AUTO: 0.04 K/UL
BASOPHILS NFR BLD AUTO: 0.7 %
BILIRUB SERPL-MCNC: 0.4 MG/DL
BUN SERPL-MCNC: 14 MG/DL
CALCIUM SERPL-MCNC: 9.8 MG/DL
CHLORIDE SERPL-SCNC: 100 MMOL/L
CHOLEST SERPL-MCNC: 207 MG/DL
CO2 SERPL-SCNC: 26 MMOL/L
CORTIS SERPL-MCNC: 10.3 UG/DL
CREAT SERPL-MCNC: 0.69 MG/DL
EGFR: 102 ML/MIN/1.73M2
EOSINOPHIL # BLD AUTO: 0.05 K/UL
EOSINOPHIL NFR BLD AUTO: 0.9 %
FERRITIN SERPL-MCNC: 63 NG/ML
GLUCOSE SERPL-MCNC: 85 MG/DL
HCT VFR BLD CALC: 37.3 %
HDLC SERPL-MCNC: 72 MG/DL
HGB BLD-MCNC: 12.6 G/DL
IMM GRANULOCYTES NFR BLD AUTO: 0.7 %
IRON SATN MFR SERPL: 25 %
IRON SERPL-MCNC: 74 UG/DL
LDLC SERPL CALC-MCNC: 119 MG/DL
LYMPHOCYTES # BLD AUTO: 1.28 K/UL
LYMPHOCYTES NFR BLD AUTO: 23.5 %
MAN DIFF?: NORMAL
MCHC RBC-ENTMCNC: 30.1 PG
MCHC RBC-ENTMCNC: 33.8 GM/DL
MCV RBC AUTO: 89 FL
MONOCYTES # BLD AUTO: 0.45 K/UL
MONOCYTES NFR BLD AUTO: 8.3 %
NEUTROPHILS # BLD AUTO: 3.59 K/UL
NEUTROPHILS NFR BLD AUTO: 65.9 %
NONHDLC SERPL-MCNC: 134 MG/DL
PLATELET # BLD AUTO: 192 K/UL
POTASSIUM SERPL-SCNC: 3.9 MMOL/L
PROT SERPL-MCNC: 7.1 G/DL
RBC # BLD: 4.19 M/UL
RBC # FLD: 12.6 %
SODIUM SERPL-SCNC: 141 MMOL/L
T3 SERPL-MCNC: 139 NG/DL
T4 FREE SERPL-MCNC: 1.1 NG/DL
T4 SERPL-MCNC: 7.8 UG/DL
TIBC SERPL-MCNC: 298 UG/DL
TRIGL SERPL-MCNC: 73 MG/DL
TSH SERPL-ACNC: 0.84 UIU/ML
UIBC SERPL-MCNC: 224 UG/DL
WBC # FLD AUTO: 5.45 K/UL

## 2022-11-08 LAB
CYTOLOGY CVX/VAG DOC THIN PREP: ABNORMAL
MYCOPLASMA HOMINIS CULTURE: NEGATIVE
UREAPLASMA CULTURE: NEGATIVE

## 2022-11-08 RX ORDER — VITAMIN B COMPLEX
CAPSULE ORAL DAILY
Qty: 30 | Refills: 0 | Status: ACTIVE | COMMUNITY
Start: 2022-11-08

## 2022-11-08 RX ORDER — ELECTROLYTES/DEXTROSE
10 SOLUTION, ORAL ORAL AT BEDTIME
Refills: 0 | Status: COMPLETED | COMMUNITY
Start: 2020-06-04 | End: 2022-11-08

## 2022-11-21 ENCOUNTER — LABORATORY RESULT (OUTPATIENT)
Age: 57
End: 2022-11-21

## 2022-11-21 ENCOUNTER — TRANSCRIPTION ENCOUNTER (OUTPATIENT)
Age: 57
End: 2022-11-21

## 2022-11-22 ENCOUNTER — LABORATORY RESULT (OUTPATIENT)
Age: 57
End: 2022-11-22

## 2022-11-22 ENCOUNTER — APPOINTMENT (OUTPATIENT)
Dept: ORTHOPEDIC SURGERY | Facility: CLINIC | Age: 57
End: 2022-11-22
Payer: COMMERCIAL

## 2022-11-22 ENCOUNTER — TRANSCRIPTION ENCOUNTER (OUTPATIENT)
Age: 57
End: 2022-11-22

## 2022-11-22 VITALS — SYSTOLIC BLOOD PRESSURE: 136 MMHG | DIASTOLIC BLOOD PRESSURE: 85 MMHG | HEART RATE: 76 BPM

## 2022-11-22 VITALS — BODY MASS INDEX: 18.25 KG/M2 | HEIGHT: 63 IN | WEIGHT: 103 LBS

## 2022-11-22 PROCEDURE — 99215 OFFICE O/P EST HI 40 MIN: CPT

## 2022-11-22 PROCEDURE — 73110 X-RAY EXAM OF WRIST: CPT | Mod: RT

## 2022-11-28 ENCOUNTER — TRANSCRIPTION ENCOUNTER (OUTPATIENT)
Age: 57
End: 2022-11-28

## 2022-12-01 ENCOUNTER — TRANSCRIPTION ENCOUNTER (OUTPATIENT)
Age: 57
End: 2022-12-01

## 2022-12-01 LAB
CCP AB SER IA-ACNC: 14 UNITS
CRP SERPL-MCNC: <3 MG/L
ERYTHROCYTE [SEDIMENTATION RATE] IN BLOOD BY WESTERGREN METHOD: 21 MM/HR
MAGNESIUM SERPL-MCNC: 2 MG/DL
RF+CCP IGG SER-IMP: NEGATIVE
RHEUMATOID FACT SER QL: 11 IU/ML

## 2022-12-05 ENCOUNTER — APPOINTMENT (OUTPATIENT)
Dept: NEUROLOGY | Facility: CLINIC | Age: 57
End: 2022-12-05

## 2022-12-05 ENCOUNTER — NON-APPOINTMENT (OUTPATIENT)
Age: 57
End: 2022-12-05

## 2022-12-05 VITALS
BODY MASS INDEX: 17.72 KG/M2 | DIASTOLIC BLOOD PRESSURE: 78 MMHG | SYSTOLIC BLOOD PRESSURE: 130 MMHG | HEIGHT: 63 IN | WEIGHT: 100 LBS

## 2022-12-05 PROCEDURE — 99215 OFFICE O/P EST HI 40 MIN: CPT

## 2022-12-05 NOTE — ASSESSMENT
[FreeTextEntry1] : Carpal tunnel syndrome\par Agree with use of wrist braces at night\par Likely has some degenerative changes around the right wrist and thumb as she is having pain in this region and she is seeing a hand orthopedist\par \par I would like to recheck her in about 3 months and I will be available by phone prior to that as needed

## 2022-12-05 NOTE — HISTORY OF PRESENT ILLNESS
[FreeTextEntry1] : The end of September she developed pain in her right thumb\par That same night she woke with the right hand being numb and has had intermittent numbness in her hands since when sleeping\par For short period of time she started to feel zaps in both her legs and occasionally her face.  They have gone away.\par She does have a moderate amount of pain around the base of the right thumb and the lateral aspect of the wrist.\par She is seeing a hand orthopedist and a neurologist at [facility]\par Imaging studies done include:\par \par Cervical spine MRI done 11/18/2021 showing degenerative changes\par Thoracic spine MRI showed mild foraminal narrowing at T3-4\par Brain MRI shows stable white matter lesions compared to prior study done a year before\par EMG done of the arms and legs dated 12/2/2021 showed mild right carpal tunnel syndrome, normal study of the legs\par She has been using wrist braces at night when she sleeps\par \par She has no neck pain whatsoever.  Has no lower back pain\par \par She saw a neurologist at [facility] a year ago for some right-sided facial pains.  These have resolved.\par Those records have been reviewed\par \par Medical history significant for surgery for thyroid cancer about a year ago otherwise negative\par \par She is a

## 2022-12-05 NOTE — PHYSICAL EXAM
[FreeTextEntry1] : There is full range of movement of the cervical spine.\par There is no cervical palpation tenderness.\par Tinel sign negative at the wrists and elbows.\par Phalen sign negative at the wrists.\par Adson's maneuver negative bilaterally.\par No palpation tenderness over the extensor tendons of the forearm\par Finkelstein's maneuver negative [General Appearance - Alert] : alert [General Appearance - In No Acute Distress] : in no acute distress [General Appearance - Well-Appearing] : healthy appearing [Oriented To Time, Place, And Person] : oriented to person, place, and time [Impaired Insight] : insight and judgment were intact [Affect] : the affect was normal [Memory Recent] : recent memory was not impaired [Person] : oriented to person [Place] : oriented to place [Time] : oriented to time [Concentration Intact] : normal concentrating ability [Fluency] : fluency intact [Comprehension] : comprehension intact [Cranial Nerves Optic (II)] : visual acuity intact bilaterally,  visual fields full to confrontation, pupils equal round and reactive to light [Cranial Nerves Oculomotor (III)] : extraocular motion intact [Cranial Nerves Trigeminal (V)] : facial sensation intact symmetrically [Cranial Nerves Facial (VII)] : face symmetrical [Cranial Nerves Vestibulocochlear (VIII)] : hearing was intact bilaterally [Cranial Nerves Glossopharyngeal (IX)] : tongue and palate midline [Cranial Nerves Accessory (XI - Cranial And Spinal)] : head turning and shoulder shrug symmetric [Cranial Nerves Hypoglossal (XII)] : there was no tongue deviation with protrusion [Motor Tone] : muscle tone was normal in all four extremities [Motor Strength] : muscle strength was normal in all four extremities [No Muscle Atrophy] : normal bulk in all four extremities [Paresis Pronator Drift Right-Sided] : no pronator drift on the right [Paresis Pronator Drift Left-Sided] : no pronator drift on the left [Sensation Tactile Decrease] : light touch was intact [Sensation Pain / Temperature Decrease] : pain and temperature was intact [Romberg's Sign] : Romberg's sign was negtive [Abnormal Walk] : normal gait [Balance] : balance was intact [Past-pointing] : there was no past-pointing [Tremor] : no tremor present [Coordination - Dysmetria Impaired Finger-to-Nose Bilateral] : not present [Coordination - Dysmetria Impaired Heel-to-Shin Bilateral] : not present [2+] : Ankle jerk left 2+ [Plantar Reflex Right Only] : normal on the right [Plantar Reflex Left Only] : normal on the left [PERRL With Normal Accommodation] : pupils were equal in size, round, reactive to light, with normal accommodation [Extraocular Movements] : extraocular movements were intact [Full Visual Field] : full visual field

## 2022-12-06 ENCOUNTER — TRANSCRIPTION ENCOUNTER (OUTPATIENT)
Age: 57
End: 2022-12-06

## 2022-12-06 LAB
ANA SER IF-ACNC: NEGATIVE
B BURGDOR AB SER-IMP: NEGATIVE
B BURGDOR IGG+IGM SER QL: 0.18 INDEX
DEPRECATED KAPPA LC FREE/LAMBDA SER: 1.44 RATIO
DSDNA AB SER-ACNC: <12 IU/ML
ENA SCL70 IGG SER IA-ACNC: <0.2 AL
IGA SER QL IEP: 331 MG/DL
IGG SER QL IEP: 948 MG/DL
IGM SER QL IEP: 235 MG/DL
KAPPA LC CSF-MCNC: 1.22 MG/DL
KAPPA LC SERPL-MCNC: 1.76 MG/DL
VIT A SERPL-MCNC: 27.7 UG/DL
ZINC SERPL-MCNC: 85 UG/DL

## 2022-12-07 ENCOUNTER — NON-APPOINTMENT (OUTPATIENT)
Age: 57
End: 2022-12-07

## 2022-12-07 ENCOUNTER — APPOINTMENT (OUTPATIENT)
Dept: HEMATOLOGY ONCOLOGY | Facility: CLINIC | Age: 57
End: 2022-12-07

## 2022-12-07 ENCOUNTER — OUTPATIENT (OUTPATIENT)
Dept: OUTPATIENT SERVICES | Facility: HOSPITAL | Age: 57
LOS: 1 days | Discharge: HOME | End: 2022-12-07

## 2022-12-07 VITALS
DIASTOLIC BLOOD PRESSURE: 72 MMHG | WEIGHT: 105 LBS | SYSTOLIC BLOOD PRESSURE: 120 MMHG | BODY MASS INDEX: 18.61 KG/M2 | OXYGEN SATURATION: 97 % | HEIGHT: 63 IN | HEART RATE: 78 BPM

## 2022-12-07 DIAGNOSIS — Z98.890 OTHER SPECIFIED POSTPROCEDURAL STATES: Chronic | ICD-10-CM

## 2022-12-07 DIAGNOSIS — D47.2 MONOCLONAL GAMMOPATHY: ICD-10-CM

## 2022-12-07 DIAGNOSIS — Z98.82 BREAST IMPLANT STATUS: Chronic | ICD-10-CM

## 2022-12-07 DIAGNOSIS — D27.1 BENIGN NEOPLASM OF LEFT OVARY: Chronic | ICD-10-CM

## 2022-12-07 LAB
T3 SERPL-MCNC: 133 NG/DL
T4 FREE SERPL-MCNC: 1 NG/DL
T4 SERPL-MCNC: 7.5 UG/DL
TSH SERPL-ACNC: 1.22 UIU/ML

## 2022-12-07 PROCEDURE — 99203 OFFICE O/P NEW LOW 30 MIN: CPT

## 2022-12-07 NOTE — ASSESSMENT
[FreeTextEntry1] : 57 F here for evaluation of elevated IgM kappa.\par \par Pt has muscle and joint issues following up with neurology and rheum. Labs on 11/21/22 showed weak IgM kappa on LORNA.. Previous LORNA x 2did not show any monoclonal band. Labs reviewed showed pt is not anemic, not thrombocytopenic. Normal calcium lvl and normal renal functions. \par \par Pt feels extremely anxious and distressed about the findings. \par \par Medical history significant for surgery for thyroid cancer about a year ago otherwise negative\par \par \par # possible IgM MGUS\par -11/21/22 labs showed weak IgM kappa on LORNA. No M-spike\par -previous LORNA in 2021 and 2015 were negative\par -normal immunoglbulin panels\par -no anemia, thrombocytopenia, hypercalcemia, renal dysfuction\par -discussed with pt that it could be transient as it could be elevated during inflammation, infections, renal dysfunction \par -provided reassurance \par -we will repeat tests in 1 month \par \par \par # anxiety\par -pt is extremely anxious and in distress, at times tearful, regarding the finding\par -however she denies that she have anxiety during normal times and does not want to take medication\par \par \par

## 2022-12-07 NOTE — PHYSICAL EXAM
[Fully active, able to carry on all pre-disease performance without restriction] : Status 0 - Fully active, able to carry on all pre-disease performance without restriction [Normal] : grossly intact [de-identified] : very anxious, tearful at times, in distress about elvated IgM K

## 2022-12-07 NOTE — HISTORY OF PRESENT ILLNESS
[de-identified] : 57 F here for evaluation of elevated IgM kappa.\par \par Pt has muscle and joint issues following up with neurology and rheum. Labs in 11/21/22 showed weak IgM kappa on LORNA.. Previous LORNA x 2did not show any monoclonal band. Labs reviewed showed pt is not anemic, not thrombocytopenic. Normal calcium lvl and normal renal functions. \par \par Pt feels extremely anxious and distressed about the findings. \par \par Medical history significant for surgery for thyroid cancer about a year ago otherwise negative\par \par She is a

## 2022-12-08 ENCOUNTER — TRANSCRIPTION ENCOUNTER (OUTPATIENT)
Age: 57
End: 2022-12-08

## 2022-12-11 NOTE — CONSULT LETTER
[Dear  ___] : Dear  [unfilled], [Consult Letter:] : I had the pleasure of evaluating your patient, [unfilled]. [Please see my note below.] : Please see my note below. [Consult Closing:] : Thank you very much for allowing me to participate in the care of this patient.  If you have any questions, please do not hesitate to contact me. [Sincerely,] : Sincerely, [FreeTextEntry2] : Amparo Landin, \par [address] [place] [address]\[place] [address] [FreeTextEntry3] : Lexx Angeles MD

## 2022-12-11 NOTE — PHYSICAL EXAM
[de-identified] : General Exam:\par \par [The patient is alert and oriented, is well appearing, and in no apparent distress]\par [Inspection of the *wrists shows no signs of skin changes or muscle asymmetry]\par \par Examination of bilateral wrists reveals *no discomfort with compression of carpal tunnels. \par Examination of bilateral thumbs at the DIP joints shows signs of early osteoarthritis.  There is cystic formation dorsally. [de-identified] : [4] views of bilateral hands and wrists were obtained today in my office and were seen by me and discussed with the patient. 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

## 2022-12-11 NOTE — DISCUSSION/SUMMARY
[FreeTextEntry1] : 1.  She has elected to wear bilateral wrist braces that should help with her carpal tunnel like symptoms\par \par #2 I would like to see her back when she gets the results of the EMG nerve study of bilateral upper extremities \par \par * all areas with * have been changed from original documentation on patients request.

## 2022-12-11 NOTE — HISTORY OF PRESENT ILLNESS
[FreeTextEntry1] : Lory is a 57-year-old female who presents today with complaints of bilateral hand numbness and tingling waking her up at night.  She also complains of multiple finger pain.

## 2022-12-11 NOTE — ASSESSMENT
[FreeTextEntry1] : ASSESSMENT:\par \par The patient comes in today with *injured right hand thumb on 9/23/22 with symptoms that start sporadic numbness at night waking up in one hand or both sometimes tingling 2 or more fingers or palms * of numbness and tingling to bilateral hands with on and off symptoms of tendinosis and finger pain radiating up into *right elbow only with left shoulder ache she states but not pain*.  She *is currently being treated by other physicians* including a neurologist who recommended multiple MRIs including brain, cervical and thoracic spine. She is scheduled to undergo EMG of both upper extremities and lower extremities due to complaints of numbness and tingling.\par \par [For this I was able to review other physician’s note(s)]\par [I was able to review other tests or imaging results] \par \par She was adequately and thoroughly informed of my assessment of their current condition(s). \par \par For this office visit I spent up to 80 minutes or more discussing all of the above symptoms with the patient as well as analyzing imaging and other studies.\par \par \par \par

## 2022-12-12 ENCOUNTER — TRANSCRIPTION ENCOUNTER (OUTPATIENT)
Age: 57
End: 2022-12-12

## 2022-12-12 ENCOUNTER — APPOINTMENT (OUTPATIENT)
Dept: HEMATOLOGY ONCOLOGY | Facility: CLINIC | Age: 57
End: 2022-12-12

## 2022-12-12 PROCEDURE — 99213 OFFICE O/P EST LOW 20 MIN: CPT

## 2022-12-12 NOTE — PHYSICAL EXAM
[Fully active, able to carry on all pre-disease performance without restriction] : Status 0 - Fully active, able to carry on all pre-disease performance without restriction [Normal] : grossly intact [de-identified] : very anxious, tearful at times, in distress about elvated IgM K

## 2022-12-12 NOTE — HISTORY OF PRESENT ILLNESS
[de-identified] : 57 F here for evaluation of elevated IgM kappa.\par \par Pt has muscle and joint issues following up with neurology and rheum. Labs in 11/21/22 showed weak IgM kappa on LORNA.. Previous LORNA x 2did not show any monoclonal band. Labs reviewed showed pt is not anemic, not thrombocytopenic. Normal calcium lvl and normal renal functions. \par \par Pt feels extremely anxious and distressed about the findings. \par \par Medical history significant for surgery for thyroid cancer about a year ago otherwise negative\par \par She is a   [de-identified] : 12/12/22: Lory is here to discuss immunoglobulin levels. She feels very anxious about polyclonal increase of IgM. Reassured her that polyclonal elevation can be seen in inflammatory reaction, infection. Unlikely to MM.

## 2022-12-12 NOTE — ASSESSMENT
[FreeTextEntry1] : 57 F here for evaluation of elevated IgM kappa.\par \par Pt has muscle and joint issues following up with neurology and rheum. Labs on 11/21/22 showed weak IgM kappa on LORNA.. Previous LORNA x 2did not show any monoclonal band. Labs reviewed showed pt is not anemic, not thrombocytopenic. Normal calcium lvl and normal renal functions. \par \par Pt feels extremely anxious and distressed about the findings. \par \par Medical history significant for surgery for thyroid cancer about a year ago, otherwise negative\par \par \par # possible IgM MGUS\par -11/21/22 labs showed weak IgM kappa on LORNA. No M-spike\par -previous LORNA in 2021 and 2015 were negative\par -normal immunoglbulin panels\par -no anemia, thrombocytopenia, hypercalcemia, renal dysfuction\par -discussed with pt that it could be transient as it could be elevated during inflammation, infections, renal dysfunction \par -provided reassurance and emotional support\par -we will repeat tests in 1 month \par \par \par # anxiety\par -pt is extremely anxious and in distress, at times tearful, regarding the finding\par -however she denies that she have anxiety during normal times and does not want to take medication\par \par \par

## 2022-12-13 ENCOUNTER — APPOINTMENT (OUTPATIENT)
Dept: ENDOCRINOLOGY | Facility: CLINIC | Age: 57
End: 2022-12-13

## 2022-12-13 VITALS
HEART RATE: 78 BPM | OXYGEN SATURATION: 97 % | HEIGHT: 63 IN | SYSTOLIC BLOOD PRESSURE: 118 MMHG | BODY MASS INDEX: 18.25 KG/M2 | WEIGHT: 103 LBS | DIASTOLIC BLOOD PRESSURE: 72 MMHG

## 2022-12-13 PROCEDURE — 99214 OFFICE O/P EST MOD 30 MIN: CPT

## 2022-12-14 ENCOUNTER — APPOINTMENT (OUTPATIENT)
Dept: ORTHOPEDIC SURGERY | Facility: CLINIC | Age: 57
End: 2022-12-14

## 2022-12-14 VITALS
DIASTOLIC BLOOD PRESSURE: 66 MMHG | SYSTOLIC BLOOD PRESSURE: 110 MMHG | BODY MASS INDEX: 18.25 KG/M2 | WEIGHT: 103 LBS | HEIGHT: 63 IN

## 2022-12-14 PROCEDURE — 99214 OFFICE O/P EST MOD 30 MIN: CPT

## 2022-12-15 ENCOUNTER — TRANSCRIPTION ENCOUNTER (OUTPATIENT)
Age: 57
End: 2022-12-15

## 2022-12-16 ENCOUNTER — TRANSCRIPTION ENCOUNTER (OUTPATIENT)
Age: 57
End: 2022-12-16

## 2022-12-20 ENCOUNTER — TRANSCRIPTION ENCOUNTER (OUTPATIENT)
Age: 57
End: 2022-12-20

## 2022-12-22 ENCOUNTER — TRANSCRIPTION ENCOUNTER (OUTPATIENT)
Age: 57
End: 2022-12-22

## 2022-12-22 ENCOUNTER — LABORATORY RESULT (OUTPATIENT)
Age: 57
End: 2022-12-22

## 2022-12-27 LAB
ALBUMIN SERPL ELPH-MCNC: 4.6 G/DL
ALBUPE: 20.2 %
ALP BLD-CCNC: 101 U/L
ALPHA1UPE: 27.8 %
ALPHA2UPE: 23.9 %
ALT SERPL-CCNC: 10 U/L
ANION GAP SERPL CALC-SCNC: 11 MMOL/L
AST SERPL-CCNC: 21 U/L
BASOPHILS # BLD AUTO: 0.05 K/UL
BASOPHILS NFR BLD AUTO: 0.8 %
BETAUPE: 14.7 %
BILIRUB SERPL-MCNC: 0.3 MG/DL
BUN SERPL-MCNC: 10 MG/DL
CALCIUM SERPL-MCNC: 9.3 MG/DL
CHLORIDE SERPL-SCNC: 99 MMOL/L
CO2 SERPL-SCNC: 31 MMOL/L
CREAT 24H UR-MCNC: NORMAL G/24 H
CREAT SERPL-MCNC: 0.76 MG/DL
CREATININE UR (MAYO): 70 MG/DL
DEPRECATED KAPPA LC FREE/LAMBDA SER: 1.27 RATIO
DEPRECATED KAPPA LC FREE/LAMBDA SER: 1.27 RATIO
EGFR: 92 ML/MIN/1.73M2
EOSINOPHIL # BLD AUTO: 0.09 K/UL
EOSINOPHIL NFR BLD AUTO: 1.5 %
GAMMAUPE: 13.4 %
GLUCOSE SERPL-MCNC: 91 MG/DL
HCT VFR BLD CALC: 39.5 %
HGB BLD-MCNC: 12.9 G/DL
IGA 24H UR QL IFE: NORMAL
IGA SER QL IEP: 322 MG/DL
IGG SER QL IEP: 891 MG/DL
IGM SER QL IEP: 222 MG/DL
IMM GRANULOCYTES NFR BLD AUTO: 0.3 %
KAPPA LC 24H UR QL: NORMAL
KAPPA LC CSF-MCNC: 1.22 MG/DL
KAPPA LC CSF-MCNC: 1.22 MG/DL
KAPPA LC SERPL-MCNC: 1.55 MG/DL
KAPPA LC SERPL-MCNC: 1.55 MG/DL
LYMPHOCYTES # BLD AUTO: 1.74 K/UL
LYMPHOCYTES NFR BLD AUTO: 29 %
MAN DIFF?: NORMAL
MCHC RBC-ENTMCNC: 29.7 PG
MCHC RBC-ENTMCNC: 32.7 GM/DL
MCV RBC AUTO: 90.8 FL
MONOCYTES # BLD AUTO: 0.37 K/UL
MONOCYTES NFR BLD AUTO: 6.2 %
NEUTROPHILS # BLD AUTO: 3.72 K/UL
NEUTROPHILS NFR BLD AUTO: 62.2 %
PLATELET # BLD AUTO: 241 K/UL
POTASSIUM SERPL-SCNC: 4.2 MMOL/L
PROT PATTERN 24H UR ELPH-IMP: NORMAL
PROT SERPL-MCNC: 7.1 G/DL
PROT UR-MCNC: 6 MG/DL
PROT UR-MCNC: 6 MG/DL
RBC # BLD: 4.35 M/UL
RBC # FLD: 12.6 %
SODIUM SERPL-SCNC: 141 MMOL/L
SPECIMEN VOL 24H UR: NORMAL ML
WBC # FLD AUTO: 5.99 K/UL

## 2022-12-28 ENCOUNTER — NON-APPOINTMENT (OUTPATIENT)
Age: 57
End: 2022-12-28

## 2022-12-29 ENCOUNTER — APPOINTMENT (OUTPATIENT)
Dept: CARDIOLOGY | Facility: CLINIC | Age: 57
End: 2022-12-29

## 2022-12-29 ENCOUNTER — TRANSCRIPTION ENCOUNTER (OUTPATIENT)
Age: 57
End: 2022-12-29

## 2022-12-29 ENCOUNTER — NON-APPOINTMENT (OUTPATIENT)
Age: 57
End: 2022-12-29

## 2022-12-29 VITALS
BODY MASS INDEX: 18.25 KG/M2 | HEART RATE: 77 BPM | DIASTOLIC BLOOD PRESSURE: 74 MMHG | WEIGHT: 103 LBS | SYSTOLIC BLOOD PRESSURE: 120 MMHG | OXYGEN SATURATION: 98 % | HEIGHT: 63 IN

## 2022-12-29 PROCEDURE — 99214 OFFICE O/P EST MOD 30 MIN: CPT | Mod: 25

## 2022-12-29 PROCEDURE — 93000 ELECTROCARDIOGRAM COMPLETE: CPT

## 2022-12-29 NOTE — DISCUSSION/SUMMARY
[FreeTextEntry1] : Pt is a 56 y/o F who presents today for f/u. \par ECG reviewed with pt, recent labs reviewed\par \par ETT 01/2018: no ischemic changes, exercise time 9min 5 sec\par ETT 04/2019 no ischemic changes, exercise time 9min 30sec\par TTE 03/2016 normal LV function without significant valvular disease\par TTE 04/2019 EF 65-70% without significant valvular disease\par \par check ETT\par Mild elevation in cholesterol numbers with high HDL - will monitor closely. \par Advised lifestyle modifications \par VSS\par \par The described plan was discussed with the pt.  All questions and concerns were addressed to the best of my knowledge. \par

## 2022-12-29 NOTE — HISTORY OF PRESENT ILLNESS
[FreeTextEntry1] : Pt is a 56 y/o F who presents today for f/u.  She states that she is feeling well overall and has no active cardia symptoms.  She is concerned about her family history - father AS/CHERRIE (completely occluded)/CAD s/p PCI " maker" at age 85\par This past fall she started to skin "discomfort/tingling" and is following with neurology.  Found to have elevated IgM kappa - following with hematology\par COVID+ 05/05/2020 \par \par In the past she has seen multiple cardiologists: Dr Kristina Lauren then was following with Dr Melvin Banuelos and most recently Dr Shaina Gordon.  \par Previous ECG shows NSR with poor R wave progression.  \par \par ETT 01/2018: no ischemic changes, exercise time 9min 5 sec\par ETT 04/2019 no ischemic changes, exercise time 9min 30sec\par ETT 09/2021 no ischemic changes exercise time 8min 3sec\par TTE 03/2016 normal LV function without significant valvular disease\par TTE 04/2019 EF 65-70% without significant valvular disease\par TTE 08/2021 EF 60-65%, min MR/TR\par CUS 01/2014 WNL\par CUS 06/2021 WNL\par \par 04/2019 - 05/2020 - 04/2021 - 11/2022:\par A1c = 4.7 - 4.9 - 4.9 - 5\par TG 59 - 78 - 89 - 72\par Tchol 193 - 228 - 214 - 207\par HDL 77 - 78 - 82 - 72\par  - 134 - 114 - 119\par \par PMH: none\par Smoking status: never\par No drug use or Etoh\par Current exercise: none recently\par Daily water intake: "not much"\par Daily caffeine intake: soda - few cups\par OTC medications: folic acid, vit D, probiotic, vit C, vit D\par Family hx: father AS/CHERRIE (completely occluded)/CAD s/p PCI " maker" at age 85\par Previous cardiac testing: as mentioned\par Previous hospitalizations: breast augmentation, R knee surgery, partial thyroid resection - no problems with anesthesia\par no children\par

## 2022-12-30 ENCOUNTER — APPOINTMENT (OUTPATIENT)
Dept: HEMATOLOGY ONCOLOGY | Facility: CLINIC | Age: 57
End: 2022-12-30
Payer: COMMERCIAL

## 2022-12-30 LAB
ALBUMIN MFR SERPL ELPH: 60.9 %
ALBUMIN SERPL-MCNC: 4.3 G/DL
ALBUMIN/GLOB SERPL: 1.5 RATIO
ALPHA1 GLOB MFR SERPL ELPH: 4.3 %
ALPHA1 GLOB SERPL ELPH-MCNC: 0.3 G/DL
ALPHA2 GLOB MFR SERPL ELPH: 9.7 %
ALPHA2 GLOB SERPL ELPH-MCNC: 0.7 G/DL
B-GLOBULIN MFR SERPL ELPH: 11.8 %
B-GLOBULIN SERPL ELPH-MCNC: 0.8 G/DL
GAMMA GLOB FLD ELPH-MCNC: 0.9 G/DL
GAMMA GLOB MFR SERPL ELPH: 13.3 %
INTERPRETATION SERPL IEP-IMP: NORMAL
M PROTEIN MFR SERPL ELPH: NORMAL
M PROTEIN SPEC IFE-MCNC: NORMAL
MONOCLON BAND OBS SERPL: NORMAL
PROT SERPL-MCNC: 7.1 G/DL
PROT SERPL-MCNC: 7.1 G/DL

## 2022-12-30 PROCEDURE — 99443: CPT

## 2022-12-30 NOTE — ASSESSMENT
[FreeTextEntry1] : 57 F here for evaluation of elevated IgM kappa.\par \par Pt has muscle and joint issues following up with neurology and rheum. Labs on 11/21/22 showed weak IgM kappa on LORNA.. Previous LORNA x 2did not show any monoclonal band. Labs reviewed showed pt is not anemic, not thrombocytopenic. Normal calcium lvl and normal renal functions. \par \par Pt feels extremely anxious and distressed about the findings. \par \par Medical history significant for surgery for thyroid cancer about a year ago, otherwise negative\par \par \par # IgM MGUS\par -IEP showed weak gamma migrating protein. LORNA with weak kappa band. Mspike unable to quantitate. UPEP was negative\par -no anemia, thrombocytopenia, hypercalcemia, renal dysfunction\par -Discussed with her that this could be very early MGUS. She has no CRAB symptoms. Her neurological symptoms of numbness/tingling of fingers, toes around the mouth are unlikely related. However, we can do a fat pad bx to r/o amyloidosis. Will arrange for bx with IR\par -repeat IEP in 3 months\par \par Lory is extremely distressed by the findings, at time tearful. however she denies that she have anxiety during normal times and does not want to take medication. Provided reassurance and emotional support\par \par \par

## 2022-12-30 NOTE — PHYSICAL EXAM
[Fully active, able to carry on all pre-disease performance without restriction] : Status 0 - Fully active, able to carry on all pre-disease performance without restriction [Normal] : grossly intact [de-identified] : very anxious, tearful at times, in distress about elvated IgM K

## 2022-12-30 NOTE — HISTORY OF PRESENT ILLNESS
[de-identified] : 57 F here for evaluation of elevated IgM kappa.\par \par Pt has muscle and joint issues following up with neurology and rheum. Labs in 11/21/22 showed weak IgM kappa on LORNA.. Previous LORNA x 2did not show any monoclonal band. Labs reviewed showed pt is not anemic, not thrombocytopenic. Normal calcium lvl and normal renal functions. \par \par Pt feels extremely anxious and distressed about the findings. \par \par Medical history significant for surgery for thyroid cancer about a year ago otherwise negative\par \par She is a   [de-identified] : 12/12/22: Lory is here to discuss immunoglobulin levels. She feels very anxious about polyclonal increase of IgM. Reassured her that polyclonal elevation can be seen in inflammatory reaction, infection. Unlikely to MM.\par \par 12/30/22: spoke with Lory via TTM. IEP showed weak gamma migrating protein. LORNA with weak kappa band. Mspike unable to quantitiate. Discussed with her that this could be very early MGUS. She has no CRAB symptoms. Her neurological symptoms -numbness/tingling of fingers, toes around the mouth are unlikely related. However, we can do a fat pad bx to r/o amyloidosis.

## 2023-01-04 ENCOUNTER — NON-APPOINTMENT (OUTPATIENT)
Age: 58
End: 2023-01-04

## 2023-01-05 ENCOUNTER — TRANSCRIPTION ENCOUNTER (OUTPATIENT)
Age: 58
End: 2023-01-05

## 2023-01-06 ENCOUNTER — APPOINTMENT (OUTPATIENT)
Dept: HEMATOLOGY ONCOLOGY | Facility: CLINIC | Age: 58
End: 2023-01-06

## 2023-01-06 ENCOUNTER — RESULT REVIEW (OUTPATIENT)
Age: 58
End: 2023-01-06

## 2023-01-06 LAB
BASOPHILS # BLD AUTO: 0.1 K/UL — SIGNIFICANT CHANGE UP (ref 0–0.2)
BASOPHILS NFR BLD AUTO: 1.1 % — SIGNIFICANT CHANGE UP (ref 0–2)
EOSINOPHIL # BLD AUTO: 0 K/UL — SIGNIFICANT CHANGE UP (ref 0–0.5)
EOSINOPHIL NFR BLD AUTO: 0.8 % — SIGNIFICANT CHANGE UP (ref 0–6)
HCT VFR BLD CALC: 39.7 % — SIGNIFICANT CHANGE UP (ref 34.5–45)
HGB BLD-MCNC: 13.3 G/DL — SIGNIFICANT CHANGE UP (ref 11.5–15.5)
LYMPHOCYTES # BLD AUTO: 1.3 K/UL — SIGNIFICANT CHANGE UP (ref 1–3.3)
LYMPHOCYTES # BLD AUTO: 25.5 % — SIGNIFICANT CHANGE UP (ref 13–44)
MCHC RBC-ENTMCNC: 29.8 PG — SIGNIFICANT CHANGE UP (ref 27–34)
MCHC RBC-ENTMCNC: 33.5 G/DL — SIGNIFICANT CHANGE UP (ref 32–36)
MCV RBC AUTO: 89.2 FL — SIGNIFICANT CHANGE UP (ref 80–100)
MONOCYTES # BLD AUTO: 0.3 K/UL — SIGNIFICANT CHANGE UP (ref 0–0.9)
MONOCYTES NFR BLD AUTO: 6.2 % — SIGNIFICANT CHANGE UP (ref 2–14)
NEUTROPHILS # BLD AUTO: 3.4 K/UL — SIGNIFICANT CHANGE UP (ref 1.8–7.4)
NEUTROPHILS NFR BLD AUTO: 66.4 % — SIGNIFICANT CHANGE UP (ref 43–77)
PLATELET # BLD AUTO: 213 K/UL — SIGNIFICANT CHANGE UP (ref 150–400)
RBC # BLD: 4.45 M/UL — SIGNIFICANT CHANGE UP (ref 3.8–5.2)
RBC # FLD: 11.4 % — SIGNIFICANT CHANGE UP (ref 10.3–14.5)
WBC # BLD: 5.1 K/UL — SIGNIFICANT CHANGE UP (ref 3.8–10.5)
WBC # FLD AUTO: 5.1 K/UL — SIGNIFICANT CHANGE UP (ref 3.8–10.5)

## 2023-01-10 ENCOUNTER — TRANSCRIPTION ENCOUNTER (OUTPATIENT)
Age: 58
End: 2023-01-10

## 2023-01-10 ENCOUNTER — APPOINTMENT (OUTPATIENT)
Dept: CARDIOLOGY | Facility: CLINIC | Age: 58
End: 2023-01-10
Payer: COMMERCIAL

## 2023-01-10 PROCEDURE — 93015 CV STRESS TEST SUPVJ I&R: CPT

## 2023-01-11 ENCOUNTER — TRANSCRIPTION ENCOUNTER (OUTPATIENT)
Age: 58
End: 2023-01-11

## 2023-01-12 ENCOUNTER — TRANSCRIPTION ENCOUNTER (OUTPATIENT)
Age: 58
End: 2023-01-12

## 2023-01-16 ENCOUNTER — TRANSCRIPTION ENCOUNTER (OUTPATIENT)
Age: 58
End: 2023-01-16

## 2023-01-16 RX ORDER — CHLORHEXIDINE GLUCONATE 4 %
400 (240 MG) LIQUID (ML) TOPICAL
Qty: 90 | Refills: 3 | Status: ACTIVE | COMMUNITY
Start: 2023-01-16

## 2023-01-19 LAB — MAG AB SER QL: NEGATIVE

## 2023-01-25 ENCOUNTER — APPOINTMENT (OUTPATIENT)
Dept: HEMATOLOGY ONCOLOGY | Facility: CLINIC | Age: 58
End: 2023-01-25
Payer: COMMERCIAL

## 2023-01-25 VITALS
WEIGHT: 103 LBS | DIASTOLIC BLOOD PRESSURE: 71 MMHG | HEART RATE: 85 BPM | SYSTOLIC BLOOD PRESSURE: 111 MMHG | BODY MASS INDEX: 18.25 KG/M2 | HEIGHT: 63 IN | OXYGEN SATURATION: 97 %

## 2023-01-25 PROCEDURE — 99214 OFFICE O/P EST MOD 30 MIN: CPT

## 2023-01-26 ENCOUNTER — TRANSCRIPTION ENCOUNTER (OUTPATIENT)
Age: 58
End: 2023-01-26

## 2023-01-26 NOTE — HISTORY OF PRESENT ILLNESS
[de-identified] : 57 F here for evaluation of elevated IgM kappa.\par \par Pt has muscle and joint issues following up with neurology and rheum. Labs in 11/21/22 showed weak IgM kappa on LORNA.. Previous LORNA x 2did not show any monoclonal band. Labs reviewed showed pt is not anemic, not thrombocytopenic. Normal calcium lvl and normal renal functions. \par \par Pt feels extremely anxious and distressed about the findings. \par \par Medical history significant for surgery for thyroid cancer about a year ago otherwise negative\par \par She is a   [de-identified] : 12/12/22: Lory is here to discuss immunoglobulin levels. She feels very anxious about polyclonal increase of IgM. Reassured her that polyclonal elevation can be seen in inflammatory reaction, infection. Unlikely to MM.\par \par 12/30/22: spoke with Lory via TTM. IEP showed weak gamma migrating protein. LORNA with weak kappa band. Mspike unable to quantitiate. Discussed with her that this could be very early MGUS. She has no CRAB symptoms. Her neurological symptoms -numbness/tingling of fingers, toes around the mouth are unlikely related. However, we can do a fat pad bx to r/o amyloidosis. \par \par 1/15/23 Returns for follow up. Notes she hurt her R thumb on 9/23/22, and then developed tingling in both hands. She now feels that she is distinctly "aware of fingers and toes".

## 2023-01-26 NOTE — ASSESSMENT
[FreeTextEntry1] : 57 female who is being seen for MGUS - IgM kappa, medical history significant for surgery for thyroid cancer about a year ago, otherwise negative\par She has no anemia, thrombocytopenia, hypercalcemia, renal dysfunction\par \par 11/21/22 LORNA - weak IgM kappa\par 11/22/22 LORNA - polyclonal increase detected in one or more immunoglobulins. \par 12/22/22 SPEP - M spike -unable to quantitate / weak gamma-migrating protein.  LORNA -weak IgM kappa\par UPEP - no monoclonal protein / urine LORNA - no monoclonal band\par FLC normal, FLC ratio - normal\par MAG - antibodies negative. \par \par \par She has ongoing paresthesias of fingertips / toes and right thumb pain. \par \par Extensive discussion today about her prior labs.  Diagnosis of MGUS with weak IgM kappa band, too small to quantify.   MGUS can be associated with other disorders such as autoimmune disorders, B cell LPD, plasma cell disorders, viral infections. \par \par Management of MGUS includes follow up labs every 6-12 months. \par A fatpad biopsy was ordered previously and is pending.\par She remains very anxious.\par \par Plan is for labs in 2 months with follow up after. \par \par

## 2023-01-26 NOTE — PHYSICAL EXAM
[Fully active, able to carry on all pre-disease performance without restriction] : Status 0 - Fully active, able to carry on all pre-disease performance without restriction [Normal] : grossly intact [de-identified] : supple [de-identified] : no edema [de-identified] : no cervical adenopathy [de-identified] : anxious

## 2023-01-30 ENCOUNTER — TRANSCRIPTION ENCOUNTER (OUTPATIENT)
Age: 58
End: 2023-01-30

## 2023-01-30 ENCOUNTER — RESULT REVIEW (OUTPATIENT)
Age: 58
End: 2023-01-30

## 2023-01-31 ENCOUNTER — TRANSCRIPTION ENCOUNTER (OUTPATIENT)
Age: 58
End: 2023-01-31

## 2023-01-31 ENCOUNTER — NON-APPOINTMENT (OUTPATIENT)
Age: 58
End: 2023-01-31

## 2023-02-01 ENCOUNTER — APPOINTMENT (OUTPATIENT)
Dept: RHEUMATOLOGY | Facility: CLINIC | Age: 58
End: 2023-02-01
Payer: COMMERCIAL

## 2023-02-01 VITALS
SYSTOLIC BLOOD PRESSURE: 119 MMHG | HEIGHT: 63 IN | WEIGHT: 103 LBS | HEART RATE: 90 BPM | DIASTOLIC BLOOD PRESSURE: 73 MMHG | OXYGEN SATURATION: 98 % | RESPIRATION RATE: 18 BRPM | BODY MASS INDEX: 18.25 KG/M2

## 2023-02-01 DIAGNOSIS — D89.2 HYPERGAMMAGLOBULINEMIA, UNSPECIFIED: ICD-10-CM

## 2023-02-01 PROCEDURE — 99205 OFFICE O/P NEW HI 60 MIN: CPT | Mod: 25

## 2023-02-01 PROCEDURE — 36415 COLL VENOUS BLD VENIPUNCTURE: CPT

## 2023-02-02 LAB
24R-OH-CALCIDIOL SERPL-MCNC: 53.2 PG/ML
25(OH)D3 SERPL-MCNC: 61.8 NG/ML
ALBUMIN SERPL ELPH-MCNC: 4.8 G/DL
ALP BLD-CCNC: 103 U/L
ALT SERPL-CCNC: 9 U/L
ANION GAP SERPL CALC-SCNC: 11 MMOL/L
AST SERPL-CCNC: 22 U/L
BASOPHILS # BLD AUTO: 0.07 K/UL
BASOPHILS NFR BLD AUTO: 1.5 %
BILIRUB SERPL-MCNC: 0.4 MG/DL
BUN SERPL-MCNC: 11 MG/DL
C3 SERPL-MCNC: 102 MG/DL
C4 SERPL-MCNC: 28 MG/DL
CALCIUM SERPL-MCNC: 9.9 MG/DL
CHLORIDE SERPL-SCNC: 101 MMOL/L
CK SERPL-CCNC: 84 U/L
CO2 SERPL-SCNC: 29 MMOL/L
CREAT SERPL-MCNC: 0.84 MG/DL
CRP SERPL-MCNC: <3 MG/L
EGFR: 82 ML/MIN/1.73M2
EOSINOPHIL # BLD AUTO: 0.03 K/UL
EOSINOPHIL NFR BLD AUTO: 0.6 %
ERYTHROCYTE [SEDIMENTATION RATE] IN BLOOD BY WESTERGREN METHOD: 15 MM/HR
FOLATE SERPL-MCNC: >20 NG/ML
GLUCOSE SERPL-MCNC: 91 MG/DL
HCT VFR BLD CALC: 41.4 %
HGB BLD-MCNC: 13.2 G/DL
HIV1+2 AB SPEC QL IA.RAPID: NONREACTIVE
IMM GRANULOCYTES NFR BLD AUTO: 0.2 %
LDH SERPL-CCNC: 177 U/L
LYMPHOCYTES # BLD AUTO: 1.18 K/UL
LYMPHOCYTES NFR BLD AUTO: 24.8 %
MAN DIFF?: NORMAL
MCHC RBC-ENTMCNC: 29.1 PG
MCHC RBC-ENTMCNC: 31.9 GM/DL
MCV RBC AUTO: 91.4 FL
MONOCYTES # BLD AUTO: 0.24 K/UL
MONOCYTES NFR BLD AUTO: 5.1 %
NEUTROPHILS # BLD AUTO: 3.22 K/UL
NEUTROPHILS NFR BLD AUTO: 67.8 %
PLATELET # BLD AUTO: 233 K/UL
POTASSIUM SERPL-SCNC: 4 MMOL/L
PROT SERPL-MCNC: 7.6 G/DL
RBC # BLD: 4.53 M/UL
RBC # FLD: 12.5 %
SODIUM SERPL-SCNC: 142 MMOL/L
VIT B12 SERPL-MCNC: 1221 PG/ML
WBC # FLD AUTO: 4.75 K/UL

## 2023-02-03 ENCOUNTER — TRANSCRIPTION ENCOUNTER (OUTPATIENT)
Age: 58
End: 2023-02-03

## 2023-02-03 LAB
ACE BLD-CCNC: 41 U/L
ALDOLASE SERPL-CCNC: 3.7 U/L
HBV CORE IGG+IGM SER QL: NONREACTIVE
HBV SURFACE AB SER QL: NONREACTIVE
HBV SURFACE AG SER QL: NONREACTIVE
HCV AB SER QL: NONREACTIVE
HCV S/CO RATIO: 0.15 S/CO
HETEROPH AB SER QL: NEGATIVE
RPR SER-TITR: NORMAL

## 2023-02-04 LAB — SARS-COV-2 N GENE NPH QL NAA+PROBE: NOT DETECTED

## 2023-02-06 LAB
M TB IFN-G BLD-IMP: NEGATIVE
QUANTIFERON TB PLUS MITOGEN MINUS NIL: 2.73 IU/ML
QUANTIFERON TB PLUS NIL: 0.01 IU/ML
QUANTIFERON TB PLUS TB1 MINUS NIL: 0 IU/ML
QUANTIFERON TB PLUS TB2 MINUS NIL: 0 IU/ML

## 2023-02-07 ENCOUNTER — OUTPATIENT (OUTPATIENT)
Dept: OUTPATIENT SERVICES | Facility: HOSPITAL | Age: 58
LOS: 1 days | End: 2023-02-07

## 2023-02-07 ENCOUNTER — RESULT REVIEW (OUTPATIENT)
Age: 58
End: 2023-02-07

## 2023-02-07 ENCOUNTER — APPOINTMENT (OUTPATIENT)
Dept: INTERVENTIONAL RADIOLOGY/VASCULAR | Facility: CLINIC | Age: 58
End: 2023-02-07
Payer: COMMERCIAL

## 2023-02-07 VITALS
TEMPERATURE: 98 F | SYSTOLIC BLOOD PRESSURE: 110 MMHG | RESPIRATION RATE: 16 BRPM | DIASTOLIC BLOOD PRESSURE: 42 MMHG | HEART RATE: 78 BPM | OXYGEN SATURATION: 100 %

## 2023-02-07 DIAGNOSIS — Z98.82 BREAST IMPLANT STATUS: Chronic | ICD-10-CM

## 2023-02-07 DIAGNOSIS — D47.2 MONOCLONAL GAMMOPATHY: ICD-10-CM

## 2023-02-07 DIAGNOSIS — Z98.890 OTHER SPECIFIED POSTPROCEDURAL STATES: Chronic | ICD-10-CM

## 2023-02-07 DIAGNOSIS — D27.1 BENIGN NEOPLASM OF LEFT OVARY: Chronic | ICD-10-CM

## 2023-02-07 PROCEDURE — 88305 TISSUE EXAM BY PATHOLOGIST: CPT | Mod: 26

## 2023-02-07 PROCEDURE — 20206 BIOPSY MUSCLE PERQ NEEDLE: CPT

## 2023-02-07 PROCEDURE — 76942 ECHO GUIDE FOR BIOPSY: CPT | Mod: 26

## 2023-02-07 PROCEDURE — 88313 SPECIAL STAINS GROUP 2: CPT | Mod: 26

## 2023-02-08 LAB
ARSENIC BLD-MCNC: 1 UG/L
CADMIUM SERPL-MCNC: <0.5 UG/L
LEAD BLD-MCNC: 1.3 UG/DL
MERCURY BLD-MCNC: <1 UG/L

## 2023-02-08 NOTE — HISTORY OF PRESENT ILLNESS
[FreeTextEntry1] : PRE CALL PRIOR TO APPOINTMENT:  \par \par  Phone Number:  [phone]\par \par  COVID-19 SWAB:  na\par \par  RX on file:  yes\par \par  Referring MD:  Franck\par \par  Appointment date:  2/7/23\par \par  Clotting or Bleeding disorders:  no\par \par  PPM / Defibrillator:  no\par \par  NPO status advised:  na\par \par  History of fall:     na\par \par  Assistant device for walking:  na\par \par   home:  na\par \par  Blood Thinners:      no                        \par \par  Prescribing MD agree to have blood thinner medication held:  na\par \par  Capacity to make decisions: yes\par \par  HCP:  no\par \par  DNR:  no\par \par  Person contact for Pre-Call:  pt by juan antonio\par \par \par  Ramona- Operative Assessment: (Day of Procedure) \par \par  NPO status: na\par \par  Accompanied by:  self\par \par  Falls risk:  na\par  \par Labs:  see attached\par \par  IVL:  na\par \par  R MD: Dr. Frank Rendon  \par \par  Urine Pregnancy: na\par \par  PRE-OP instructions: provided\par \par  POST-OP teaching initiated:  yes\par \par  Allergy bracelet on: na\par \par  Antibiotic given:na\par

## 2023-02-09 ENCOUNTER — TRANSCRIPTION ENCOUNTER (OUTPATIENT)
Age: 58
End: 2023-02-09

## 2023-02-09 LAB
14-3-3 ETA AG SER IA-MCNC: <0.2 NG/ML
IGG SUBSET TOTAL IGG: 932 MG/DL
IGG1 SER-MCNC: 376 MG/DL
IGG2 SER-MCNC: 412 MG/DL
IGG3 SER-MCNC: 18 MG/DL
IGG4 SER-MCNC: 48 MG/DL

## 2023-02-13 LAB
ANTI-BETA2 GLYCOPROTEIN 1 IGG CONCENTRATION: 1 U/ML
ANTI-BETA2 GLYCOPROTEIN 1 IGM CONCENTRATION: 6 U/ML
ANTI-CARDIOLIPIN IGG CONCENTRATION: 0 GPL
ANTI-CARDIOLIPIN IGM CONCENTRATION: 5 MPL
ANTI-CENP IGG CONCENTRATION: 8 U/ML
ANTI-CYCLIC CITRULLINATED PEPTIDE IGG CONCENTRATION: 2 U/ML
ANTI-DOUBLE-STRANDED DNA IGG CONCENTRATION: 33 IU/ML
ANTI-HISTONE IGG CONCENTRATION: 0 UNITS
ANTI-JO-1 IGG CONCENTRATION: <0.3 U/ML
ANTI-NUCLEAR ANTIBODIES - CYTOPLASMIC PATTERN: NORMAL
ANTI-NUCLEAR ANTIBODIES - PRIMARY NUCLEAR PATTERN: NORMAL
ANTI-NUCLEAR ANTIBODIES - PRIMARY PATTERN TITER: ABNORMAL
ANTI-NUCLEAR ANTIBODIES IGG CONCENTRATION: 6 UNITS
ANTI-RNA POL III IGG CONCENTRATION: <0.7 U/ML
ANTI-RNP70 IGG CONCENTRATION: 2 U/ML
ANTI-RO52 IGG CONCENTRATION: <0.3 U/ML
ANTI-RO60 IGG CONCENTRATION: <0.4 U/ML
ANTI-SCL-70 IGG CONCENTRATION: <0.6 U/ML
ANTI-SMITH IGG CONCENTRATION: <0.7 U/ML
ANTI-SS-B (LA) IGG CONCENTRATION: <0.4 U/ML
ANTI-THYROGLOBULIN IGG CONCENTRATION: 28 IU/ML
ANTI-THYROID PEROXIDASE IGG CONCENTRATION: <4 IU/ML
ANTI-U1RNP IGG CONCENTRATION: 2 U/ML
AVISE LUPUS INDEX: -2
AVISE LUPUS RESULT: NEGATIVE
B-LYMPHOCYTE-BOUND C4D (BC4D) LEVEL: 14
ERYTHROCYTE-BOUND C4D (EC4D) LEVEL: 3
RHEUMATOID FACTOR (IGA) CONCENTRATION: 6 IU/ML
RHEUMATOID FACTOR (IGM) CONCENTRATION: 2 IU/ML

## 2023-02-14 ENCOUNTER — NON-APPOINTMENT (OUTPATIENT)
Age: 58
End: 2023-02-14

## 2023-02-15 LAB — SURGICAL PATHOLOGY STUDY: SIGNIFICANT CHANGE UP

## 2023-02-16 ENCOUNTER — TRANSCRIPTION ENCOUNTER (OUTPATIENT)
Age: 58
End: 2023-02-16

## 2023-02-17 ENCOUNTER — NON-APPOINTMENT (OUTPATIENT)
Age: 58
End: 2023-02-17

## 2023-02-20 ENCOUNTER — TRANSCRIPTION ENCOUNTER (OUTPATIENT)
Age: 58
End: 2023-02-20

## 2023-02-21 ENCOUNTER — NON-APPOINTMENT (OUTPATIENT)
Age: 58
End: 2023-02-21

## 2023-02-21 ENCOUNTER — APPOINTMENT (OUTPATIENT)
Dept: ORTHOPEDIC SURGERY | Facility: CLINIC | Age: 58
End: 2023-02-21

## 2023-02-21 DIAGNOSIS — Z77.018 CONTACT WITH AND (SUSPECTED) EXPOSURE TO OTHER HAZARDOUS METALS: ICD-10-CM

## 2023-02-21 LAB
A PHAGOCYTOPH IGG TITR SER IF: NORMAL TITER
B BURGDOR AB SER QL IA: NEGATIVE
B MICROTI IGG TITR SER: NORMAL TITER
E CHAFFEENSIS IGG TITR SER IF: NORMAL TITER
EJ AB SER QL: NEGATIVE
ENA JO1 AB SER IA-ACNC: <20 UNITS
ENA PM/SCL AB SER-ACNC: <20 UNITS
ENA SM+RNP AB SER IA-ACNC: <20 UNITS
ENA SS-A IGG SER QL: <20 UNITS
FIBRILLARIN AB SER QL: NEGATIVE
KU AB SER QL: NEGATIVE
MDA-5 (P140)(CADM-140): <20 UNITS
MI2 AB SER QL: NEGATIVE
NXP-2 (P140): <20 UNITS
OJ AB SER QL: NEGATIVE
PL12 AB SER QL: NEGATIVE
PL7 AB SER QL: NEGATIVE
SRP AB SERPL QL: NEGATIVE
TIF GAMMA (P155/140): <20 UNITS
U2 SNRNP AB SER QL: NEGATIVE

## 2023-02-21 NOTE — HISTORY OF PRESENT ILLNESS
[FreeTextEntry1] : 58 y/o female () referred to rheumatology for muscle and joint pains. \par Pt had full autoimmune workup by Dr. Kleiner in 8/2021 in setting of L hand pain which showed RF borderline but rest of autoimmune workup negative. Pt was not deemed to have any signs of underlying autoimmune diseases at the time.\par Pt started to have R hand pains with trauma since 9/2022 that radiates to R elbow. Pt then had complete numbness of R hand. Pt over time reports recurrent numbness/tingling, pains of hands, legs, feet, mouth, tongue. Reports occasional cramping of calf and foot.\par Pt had workup by neurologist with MR brain, EMG/NCV with nonspecific hyperdensities (has been told age related and stable) and EMG/NCV showed mild R CTS. 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 Pt is pending MR of R hand. \par Pt follows with hem/onc for MGUS – pending fat pad biopsy to rule out amyloidosis. \par Pt exercises every day with improved symptoms during exercising.\par Pt has been taking turmuric, Mg for the last few weeks.\par \par WORKUP:\par MR R finger (2/2023): Mild OA changes of 1st IP and small joint effusion with soft tissue edema. Small effusion at 1st MP and CMC joints. Mild subcutaneous edema of radial margin of thumb at the MP joint.\par No evidence of ligament tear or erosions.\par

## 2023-02-21 NOTE — ASSESSMENT
[FreeTextEntry1] : 56 y/o female () referred to rheumatology for muscle and joint pains. \par Pt had full autoimmune workup by Dr. Kleiner in 8/2012 in setting of L hand pain which showed RF borderline but rest of autoimmune workup negative. Pt was not deemed to have any signs of underlying autoimmune diseases at the time.\par Pt started to have R hand pains with trauma since 9/2022 that radiates to R elbow. Pt then had complete numbness of R hand. Pt over time reports recurrent numbness/tingling, pains of hands, legs, feet, mouth, tongue. Reports occasional cramping of thighs.\par Pt had workup by neurologist with MR brain, EMG/NCV with nonspecific hyperdensities (has been told age related and stable) and EMG/NCV showed mild R CTS. 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 Pt is pending MR of R hand. \par Pt follows with hem/onc for MGUS – pending fat pad biopsy to rule out amyloidosis. \par Pt exercises every day with improved symptoms during exercising.\par Pt has been taking turmuric, Mg for the last few weeks.\par \par Patient has various symptoms, most significantly diffuse neuropathic symptoms. Pt symptoms are not consistent with diagnosis of any specific autoimmune rheum diseases. Workup done by Dr. Kleiner 1.5 years ago and more recently done by her PCP are unremarkable. Pt has had extensive workup showing MGUS which has been worked up by hem/onc but overall unrevealing.\par I discussed extensively with the patient that although it is reasonable to undergo further autoimmune workup, given negative results so far and unusual symptoms, systemic autoimmune rheum disease is not likely to be the cause of her symptoms. I can order labwork to evaluate for other causes, but pt has already had extensive testing done, and pt's main symptoms of diffuse neuropathy can only be comprehensively worked up by neurology.\par I also discussed that it is possible that further workup may not show clear underlying cause of her symptoms, in which case symptomatic therapy and watching for any evolution of the symptoms may be the next appropriate step. Fibromyalgia may be a diagnosis she may or may not end up with based on further workup and progression of symptoms, but treatment would be symptomatic anyways. Time course of pt's symptoms is highly unusual for fibromyalgia. CRPS less likely given (although triggered by trauma of R hand) diffuse nature of the symptoms.\par \par - Obtain labwork to evaluate for autoimmune, and other causes of pt's diffuse neuropathy\par - Advised to continue follow up with neurology\par - Will contact pt when lab results return - pt would like to  the results and call me to let me know about timing for the phone call prior to discussion. 1 month follow up scheduled in case it is needed.\par \par THE DURATION OF THE CLINIC VISIT WAS >60 MINUTES\par \par

## 2023-02-22 ENCOUNTER — TRANSCRIPTION ENCOUNTER (OUTPATIENT)
Age: 58
End: 2023-02-22

## 2023-02-23 ENCOUNTER — APPOINTMENT (OUTPATIENT)
Dept: ORTHOPEDIC SURGERY | Facility: CLINIC | Age: 58
End: 2023-02-23
Payer: COMMERCIAL

## 2023-02-23 ENCOUNTER — TRANSCRIPTION ENCOUNTER (OUTPATIENT)
Age: 58
End: 2023-02-23

## 2023-02-23 VITALS — WEIGHT: 103 LBS | HEIGHT: 63 IN | BODY MASS INDEX: 18.25 KG/M2

## 2023-02-23 PROCEDURE — 20550 NJX 1 TENDON SHEATH/LIGAMENT: CPT

## 2023-02-23 PROCEDURE — 99203 OFFICE O/P NEW LOW 30 MIN: CPT | Mod: 25

## 2023-02-23 PROCEDURE — L3809: CPT

## 2023-02-23 NOTE — HISTORY OF PRESENT ILLNESS
[8] : 8 [5] : 5 [Dull/Aching] : dull/aching [Sharp] : sharp [Intermittent] : intermittent [Nothing helps with pain getting better] : Nothing helps with pain getting better [de-identified] : Has burning pain right thumb/wrist. Has had xrays which were negative and a negative MRI(small joint effusion, no OA). No clicking, catching or locking.  [] : no [FreeTextEntry1] : RT Wrist  [FreeTextEntry5] : RT Wrist pain. Pt has Hx of D'Quervain's Tendonitis.

## 2023-02-23 NOTE — DATA REVIEWED
[MRI] : MRI [Outside X-rays] : outside x-rays [Right] : of the right [Hand] : hand [I reviewed the films/CD and agree] : I reviewed the films/CD and agree

## 2023-02-23 NOTE — PHYSICAL EXAM
[First Dorsal Compartment] : first dorsal compartment [1st] : 1st [CMC Joint] : CMC joint [] : negative triggering

## 2023-02-23 NOTE — ASSESSMENT
[FreeTextEntry1] : Will try CSI 1st dorsal compartment although her symptoms are not clear. Will give thumb spica splint as well

## 2023-02-23 NOTE — PROCEDURE
[Tendon Sheath] : tendon sheath [Right] : of the right [De Syl's] : stephany fatima's [Pain] : pain [Alcohol] : alcohol [Betadine] : betadine [Ethyl Chloride sprayed topically] : ethyl chloride sprayed topically [Sterile technique used] : sterile technique used [___ cc    6mg] :  Betamethasone (Celestone) ~Vcc of 6mg [___ cc    1%] : Lidocaine ~Vcc of 1%

## 2023-02-27 ENCOUNTER — TRANSCRIPTION ENCOUNTER (OUTPATIENT)
Age: 58
End: 2023-02-27

## 2023-03-02 ENCOUNTER — TRANSCRIPTION ENCOUNTER (OUTPATIENT)
Age: 58
End: 2023-03-02

## 2023-03-03 ENCOUNTER — APPOINTMENT (OUTPATIENT)
Dept: RHEUMATOLOGY | Facility: CLINIC | Age: 58
End: 2023-03-03
Payer: COMMERCIAL

## 2023-03-03 PROCEDURE — 99214 OFFICE O/P EST MOD 30 MIN: CPT

## 2023-03-03 NOTE — ASSESSMENT
[FreeTextEntry1] : 56 y/o female () presents as follow up for muscle and joint pains.  \par Pt had full autoimmune workup by Dr. Kleiner in 8/2012 in setting of L hand pain which showed RF borderline but rest of autoimmune workup negative. Pt was not deemed to have any signs of underlying autoimmune diseases at the time. \par Pt started to have R hand pains with trauma since 9/2022 that radiates to R elbow. Pt then had complete numbness of R hand. Pt over time reports recurrent numbness/tingling, pains of hands, legs, feet, mouth, tongue. Reports occasional cramping of thighs. \par Pt had workup by neurologist with MR brain, EMG/NCV with nonspecific hyperdensities (has been told age related and stable) and EMG/NCV showed mild R CTS. Pt had abnormal LORNA with polyclonal increase in immunoglobulins and was seen by hematologist and told likely inflammatory rather than malignancy. 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 Pt is pending MR of R hand.  \par Pt follows with hem/onc for MGUS – pending fat pad biopsy to rule out amyloidosis.  \par Pt exercises every day with improved symptoms during exercising. \par Pt has been taking turmuric, Mg\par \par Patient has various symptoms, most significantly diffuse neuropathic symptoms. Pt symptoms are not consistent with diagnosis of any specific autoimmune rheum diseases. Workup done by Dr. Kleiner 1.5 years ago and more recently done by her PCP are unremarkable. \par Extensive autoimmune workup by me negative except LAURA+, borderline centromere Ab. We discussed at length that overall no signs of autoimmune diseases and the abnormal labwork are non-specific and do not correspond to her symptoms. Pt has had extensive appropriate workup by neurology which was all negative.\par Pt reports improvement of her R thumb pain with deQuervein's injection and overall neuropathy with heat/ice alternating therapy.\par \par - Advised to continue follow up with neurology \par - Focus on symptomatic treatment, personally would not recommend further diagnostic testing unless symptoms worsen or new symptoms occur\par - RTC PRN\par

## 2023-03-03 NOTE — HISTORY OF PRESENT ILLNESS
[FreeTextEntry1] : HISTORY: \par 58 y/o female () presents as follow up for muscle and joint pains.  \par Pt had full autoimmune workup by Dr. Kleiner in 8/2012 in setting of L hand pain which showed RF borderline but rest of autoimmune workup negative. Pt was not deemed to have any signs of underlying autoimmune diseases at the time. \par Pt started to have R hand pains with trauma since 9/2022 that radiates to R elbow. Pt then had complete numbness of R hand. Pt over time reports recurrent numbness/tingling, pains of hands, legs, feet, mouth, tongue. Reports occasional cramping of thighs. \par Pt had workup by neurologist with MR brain, EMG/NCV with nonspecific hyperdensities (has been told age related and stable) and EMG/NCV showed mild R CTS. Pt had abnormal LORNA with polyclonal increase in immunoglobulins and was seen by hematologist and told likely inflammatory rather than malignancy. 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 Pt is pending MR of R hand.  \par Pt follows with hem/onc for MGUS – pending fat pad biopsy to rule out amyloidosis.  \par Pt exercises every day with improved symptoms during exercising. \par Pt has been taking turmuric, Mg\par \par INTERVAL HISTORY: \par I discussed with pt at length about extensive negative autoimmune results.\par Pt had steroid injection for DeQuervein’s 8 days ago and has been wearing thumb splints since then.\par \par WORKUP: \par Remarkable for (2/2023): LAURA 1:160 DFS, borderline centromere Ab (Rest of AVISE-CTD negative) \par Normal/neg (2/2023): CBC, CMP, ESR, CRP, C3, C4, Myomarker panel, 14.3.3 eta protein, IgG subsets, ACE level, CPK, aldolase, LDH, Vit D 25-OH, Vit D 1,25-OH, B12, folate, heavy metal screen, Tick-borne diseases, RPR, HIV, EBV, Hep B/C, Quantiferon TB \par MR R finger (2/2023): Mild OA changes of 1st IP and small joint effusion with soft tissue edema. Small effusion at 1st MP and CMC joints. Mild subcutaneous edema of radial margin of thumb at the MP joint. \par No evidence of ligament tear or erosions.\par

## 2023-03-13 ENCOUNTER — APPOINTMENT (OUTPATIENT)
Dept: NEUROLOGY | Facility: CLINIC | Age: 58
End: 2023-03-13

## 2023-03-13 VITALS
DIASTOLIC BLOOD PRESSURE: 70 MMHG | SYSTOLIC BLOOD PRESSURE: 108 MMHG | BODY MASS INDEX: 18.25 KG/M2 | HEIGHT: 63 IN | WEIGHT: 103 LBS

## 2023-03-15 ENCOUNTER — LABORATORY RESULT (OUTPATIENT)
Age: 58
End: 2023-03-15

## 2023-03-21 ENCOUNTER — OUTPATIENT (OUTPATIENT)
Dept: OUTPATIENT SERVICES | Facility: HOSPITAL | Age: 58
LOS: 1 days | Discharge: HOME | End: 2023-03-21

## 2023-03-21 DIAGNOSIS — D47.2 MONOCLONAL GAMMOPATHY: ICD-10-CM

## 2023-03-21 DIAGNOSIS — D27.1 BENIGN NEOPLASM OF LEFT OVARY: Chronic | ICD-10-CM

## 2023-03-21 DIAGNOSIS — Z98.82 BREAST IMPLANT STATUS: Chronic | ICD-10-CM

## 2023-03-21 DIAGNOSIS — Z98.890 OTHER SPECIFIED POSTPROCEDURAL STATES: Chronic | ICD-10-CM

## 2023-03-22 ENCOUNTER — APPOINTMENT (OUTPATIENT)
Dept: DERMATOLOGY | Facility: CLINIC | Age: 58
End: 2023-03-22
Payer: COMMERCIAL

## 2023-03-22 PROCEDURE — 17000 DESTRUCT PREMALG LESION: CPT

## 2023-03-22 PROCEDURE — 40490 BIOPSY OF LIP: CPT | Mod: 59

## 2023-03-22 PROCEDURE — 99213 OFFICE O/P EST LOW 20 MIN: CPT | Mod: 25

## 2023-03-23 ENCOUNTER — NON-APPOINTMENT (OUTPATIENT)
Age: 58
End: 2023-03-23

## 2023-03-23 ENCOUNTER — APPOINTMENT (OUTPATIENT)
Dept: HEMATOLOGY ONCOLOGY | Facility: CLINIC | Age: 58
End: 2023-03-23
Payer: COMMERCIAL

## 2023-03-23 VITALS
DIASTOLIC BLOOD PRESSURE: 69 MMHG | HEIGHT: 63 IN | SYSTOLIC BLOOD PRESSURE: 103 MMHG | OXYGEN SATURATION: 97 % | HEART RATE: 79 BPM

## 2023-03-23 PROCEDURE — 99214 OFFICE O/P EST MOD 30 MIN: CPT

## 2023-03-27 ENCOUNTER — TRANSCRIPTION ENCOUNTER (OUTPATIENT)
Age: 58
End: 2023-03-27

## 2023-03-28 ENCOUNTER — TRANSCRIPTION ENCOUNTER (OUTPATIENT)
Age: 58
End: 2023-03-28

## 2023-03-29 ENCOUNTER — APPOINTMENT (OUTPATIENT)
Dept: INTERNAL MEDICINE | Facility: CLINIC | Age: 58
End: 2023-03-29

## 2023-03-30 ENCOUNTER — APPOINTMENT (OUTPATIENT)
Dept: NEUROLOGY | Facility: CLINIC | Age: 58
End: 2023-03-30
Payer: COMMERCIAL

## 2023-03-30 ENCOUNTER — TRANSCRIPTION ENCOUNTER (OUTPATIENT)
Age: 58
End: 2023-03-30

## 2023-03-30 VITALS
BODY MASS INDEX: 18.25 KG/M2 | HEART RATE: 93 BPM | SYSTOLIC BLOOD PRESSURE: 110 MMHG | DIASTOLIC BLOOD PRESSURE: 78 MMHG | OXYGEN SATURATION: 99 % | WEIGHT: 103 LBS | HEIGHT: 63 IN

## 2023-03-30 PROCEDURE — 99215 OFFICE O/P EST HI 40 MIN: CPT

## 2023-03-31 ENCOUNTER — TRANSCRIPTION ENCOUNTER (OUTPATIENT)
Age: 58
End: 2023-03-31

## 2023-03-31 RX ORDER — CHLORHEXIDINE GLUCONATE 4 %
400 LIQUID (ML) TOPICAL DAILY
Refills: 0 | Status: COMPLETED | COMMUNITY
End: 2023-03-31

## 2023-04-03 ENCOUNTER — TRANSCRIPTION ENCOUNTER (OUTPATIENT)
Age: 58
End: 2023-04-03

## 2023-04-04 ENCOUNTER — NON-APPOINTMENT (OUTPATIENT)
Age: 58
End: 2023-04-04

## 2023-04-04 ENCOUNTER — APPOINTMENT (OUTPATIENT)
Dept: OPHTHALMOLOGY | Facility: CLINIC | Age: 58
End: 2023-04-04
Payer: COMMERCIAL

## 2023-04-04 PROCEDURE — 92014 COMPRE OPH EXAM EST PT 1/>: CPT

## 2023-04-05 ENCOUNTER — APPOINTMENT (OUTPATIENT)
Dept: INFECTIOUS DISEASE | Facility: CLINIC | Age: 58
End: 2023-04-05
Payer: COMMERCIAL

## 2023-04-05 VITALS
DIASTOLIC BLOOD PRESSURE: 64 MMHG | BODY MASS INDEX: 18.25 KG/M2 | WEIGHT: 103 LBS | SYSTOLIC BLOOD PRESSURE: 112 MMHG | TEMPERATURE: 98.7 F | HEIGHT: 63 IN

## 2023-04-05 PROCEDURE — 99205 OFFICE O/P NEW HI 60 MIN: CPT

## 2023-04-05 NOTE — REASON FOR VISIT
[Consultation] : a consultation visit [FreeTextEntry1] : multiple neurologic complaints, referred by Dr Michael Valle

## 2023-04-05 NOTE — PHYSICAL EXAM
[General Appearance - Alert] : alert [General Appearance - In No Acute Distress] : in no acute distress [General Appearance - Well-Appearing] : healthy appearing [Sclera] : the sclera and conjunctiva were normal [Hearing Threshold Finger Rub Not Garza] : hearing was normal [Neck Appearance] : the appearance of the neck was normal [Edema] : there was no peripheral edema [FreeTextEntry1] : deferred [Skin Color & Pigmentation] : normal skin color and pigmentation [] : no rash [No Focal Deficits] : no focal deficits [Affect] : the affect was normal

## 2023-04-05 NOTE — REVIEW OF SYSTEMS
[Fever] : no fever [Chills] : no chills [Body Aches] : no body aches [Eye Pain] : no eye pain [Earache] : no earache [Chest Pain] : no chest pain [Shortness Of Breath] : no shortness of breath [Abdominal Pain] : no abdominal pain [Dysuria] : no dysuria [Joint Pain] : no joint pain [Skin Lesions] : no skin lesions [Easy Bleeding] : no tendency for easy bleeding [de-identified] : Electric shock pain sensation paresthesia

## 2023-04-05 NOTE — HISTORY OF PRESENT ILLNESS
[FreeTextEntry1] : 58-year-old female presents to the ID office for evaluation.  She says her symptoms began on September 23, 2022 she hurt her right thumb while doing a specific hand movement after that she started experiencing numbness and tingling she describes this as a pins-and-needles sensation especially during sleep and then this also extended to her other hand.  This sensation then also went to her legs and also to her face.  She now describes electric shock type pains sporadically.  She also has cramping of her toes.  Extensive work-up has been done by multiple specialist.  She has seen neurology rheumatology primary care and hematology.  Also ENT.  She says multiple tests including MRI brain MRI spine CT sinuses have all been okay.  She had an M spike that was polyclonal the reason is not known.  She had an abdominal fat pad biopsy for potential amyloidosis this was also unrevealing.  She saw a rheumatology and was told all work-up was unrevealing.  She is here because she is concerned this may be a Bartonella that has not been picked out or perhaps an other infectious disease.  She is a  and treats dogs and cats only.  She has had multiple scratches from both dogs and cats.  She brought some literature with her.  She says an infectious disease specialist who is also a  has told her that the Bartonella tests available for humans are not all very sensitive or specific.  She has had no fevers or night sweats.\par \par Social history\par Works as a  with cats and dogs including feral cats\par She is born in [place] and grew up on [place].  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  Following that she has been practicing on [place].  Other travel history includes [place] and the [place]. [de-identified] : Says that she has been tested for sexually transmitted infections including HIV and the tests have been negative.  Last HIV testing was done on February 1 and it was negative.  RPR titer was also negative.

## 2023-04-05 NOTE — ASSESSMENT
[FreeTextEntry1] : 58-year-old female presents for evaluation for an neurologic complaints of paresthesias electric shock type sensation and polyclonal M spike.  Given her work as a  and history of cat scratches we will rule out zoonoses.  I discussed with her that 16 S ribosomal RNA testing may have been helpful but it is not performed on blood.  We will look for both PCR antigens and antibodies.  I discussed with her if all testing is negative we will talk about a potential trial of antibiotics to target Bartonella.  I also discussed with her that usually Bartonella leads to neuroretinitis and that she has recently been been to an ophthalmologist and had no evidence of this.\par \par #Paresthesia, electric shock type pain, cat scratch\par – Order Bartonella PCR and antibodies (discussed with her I cannot order through [facility])\par – Ordered toxoplasma PCR and antibodies, discussed with her antibodies may be positive given her line of work\par – Order Q fever antibodies\par – Order Brucella antibodies and culture\par – Order Aspergillus antigen and antibody test\par – Follow-up first week of May to discuss results [Treatment Education] : treatment education [Medical Care Issues] : medical care issues [Disclosure of Diagnosis] : disclosure of diagnosis [Anticipatory Guidance] : anticipatory guidance

## 2023-04-07 ENCOUNTER — LABORATORY RESULT (OUTPATIENT)
Age: 58
End: 2023-04-07

## 2023-04-07 NOTE — DATA REVIEWED
[de-identified] : 1. MRI brain pre and post contrast November 18, 2022 (  Comparison to MRI performed in October 14, 2021)\par  stable nonspecific FLAIR hyperintense lesions which can be seen with migraines, small vessel ischemic disease or inflammatory demyelination.\par  no acute changes\par \par  2. MRI brain October 14, 2021  Pre and post contrast\par  scattered foci of white matter signal abnormality, nonspecific, but most likely reflecting chronic microvascular ischemic changes.\par  [de-identified] :  1.MRI right thumb  February 8, 2023\par  mild osteoarthritic changes at the interphalangeal joint of the thumb and small joint effusion.  Mild surrounding soft tissue edema.\par   Small effusion at the 1st MP and CMC  joints\par  mild subcutaneous edema also seen at the radial margin of the thumb were at the level of MP joint\par  no focal soft tissue mass\par \par  2. MRI without contrast TMJ October 14, 2021- normal\par \par  3. EMG and nerve conduction study December 2, 2022\par  abnormal study.  Electrophysiological evidence for mild right medial neuropathy at the wrist suggestive of clinical diagnosis of mild right carpal tunnel syndrome.\par   No electrophysiological evidence for myopathy, large fiber polyneuropathy or motor neuron disease.\par \par   4. Carotid Doppler ultrasound March 8, 2013\par  minimal intimal plaque without evidence of hemodynamically significant stenosis\par \par   5. MRI cervical spine  November 18, 2022\par  degenerative changes with foraminal narrowing.  No abnormal enhancement\par  degenerative disc disease with disc space narrowing most prominent at C4-5 and C5-6\par  disc osteophyte complex without significant central canal stenosis, uncovertebral arthropathy result in mild right foraminal narrowing at C4-5\par  disc osteophyte complex without significant central canal stenosis.  Uncovertebral arthropathy results in moderate right mild left foraminal narrowing at C5-6\par  C6-7 there is a broad-based disc herniation without central canal stenosis\par \par 6.  MRI thoracic spine with and without contrast November 18, 2022\par  mild T3-T4 foraminal narrowing\par  no evidence for demyelinating lesions in the thoracic spine cord\par  mild degenerative changes without significant spinal canal stenosis or nerve root compression.\par

## 2023-04-07 NOTE — REVIEW OF SYSTEMS
[Numbness] : numbness [Tingling] : tingling [Abnormal Sensation] : an abnormal sensation [Sleep Disturbances] : sleep disturbances [Joint Pain] : joint pain [Negative] : Heme/Lymph [Suicidal] : not suicidal [de-identified] :  headaches, under control at present time [FreeTextEntry1] :  paresthesia

## 2023-04-07 NOTE — DISCUSSION/SUMMARY
[FreeTextEntry1] :  58 years old right-handed female with\par 1.  paresthesia\par  involving upper and lower extremities, intermittent, random\par \par  patient underwent multiple diagnostic testing\par  follows with rheumatologist\par  as per patient  autoimmune condition ruled out\par ?MGUS  monoclonal gammopathy \par \par Clinical presentation, not typical for polyneuropathy despite neuropathic pain\par  location of paresthesia, multifocal, random\par \par  EMG/nerve conduction study of upper extremities performed, personally reviewed in detail, abnormal due to the presence of  right  mild carpal tunnel syndrome\par \par  \par II. right thumb  pain forearm pain\par  based on clinical presentation the etiology  most likely multifactorial due to combination of\par  1. inflammatory etiology\par MRI right thumb  February 8, 2023 showed osteoarthritic changes at the interphalangeal joint of the thumb and small joint effusion.  Mild surrounding soft tissue edema.  Small effusion at the 1st MP and CMC  joints\par  mild subcutaneous edema also seen at the radial margin of the thumb\par  2.  carpal tunnel syndrome\par EMG and nerve conduction study December 2, 2022\par  abnormal study.  Electrophysiological evidence for mild right medial neuropathy at the wrist suggestive of clinical diagnosis of mild right carpal tunnel syndrome.\par   No electrophysiological evidence for myopathy, large fiber polyneuropathy or motor neuron disease.\par  3. spinal etiology, degenerative changes seen on MRI cervical spine\par MRI cervical spine  November 18, 2022 showed degenerative changes with foraminal narrowing.    degenerative disc disease with disc space narrowing most prominent at C4-5 and C5-6\par  disc osteophyte complex without significant central canal stenosis, uncovertebral arthropathy result in mild right foraminal narrowing at C4-5\par  disc osteophyte complex without significant central canal stenosis.  Uncovertebral arthropathy results in moderate right mild left foraminal narrowing at C5-6\par  C6-7 there is a broad-based disc herniation without central canal stenosis\par \par \par Based on complete neurological exam performed today there is no evidence of upper motor neuron signs, myelopathic signs, no focal motor, sensory or cranial nerve deficits.\par \par   MRI brain, thoracic spine showed no evidence for demyelination.\par   MRI brain discussed with the patient in detail, chronic changes seen on MRI explained to patient\par *MRI brain pre and post contrast November 18, 2022 (  Comparison to MRI performed in October 14, 2021)\par  stable nonspecific FLAIR hyperintense lesions which can be seen with migraines, small vessel ischemic disease or inflammatory demyelination.\par  no acute changes.  no evidence for acute inflammatory demyelination based on neurological exam and imaging studies report.\par \par   I discussed with the patient the possibility of functional neurological system disorder, condition in which person experiences physical and sensory problems with no underlying neurologic pathology.\par  the reason for functional neurological system disorder could be stress, emotional or physical trauma.\par The patient became a little aggravated  When I mention psychotherapy evaluation and treatment\par \par  discussed in detail\par  healthy lifestyle\par  healthy nutrition\par  Physical activity\par  proper hydration\par  stroke risk factors modification\par

## 2023-04-07 NOTE — ASSESSMENT
[FreeTextEntry1] :  58 years old right-handed female with\par \par 1. Paresthesia\par  continue treatment with B complex 1 tablet daily ( includes folic acid 400 mg)\par  continue vitamin B12  200 mcg daily\par  continue  turmeric 720 mg twice a day\par  CONSIDER Mediterranean  antiinflammatory nutrition, nerve healthy micro elements\par - Omega- 3 360 mg twice a day\par  recommend add coenzyme Q10 100 mg daily, ginger QD\par \par  2.  neuropathic pain, management as for paresthesia\par  consider gabapentin  slow tapering up dose IF ANY worsening of pt condition, paresthesia\par (usually recommend  to start 100 mg 3 times a day with slow increasing 100 mg weekly, maximum dosage 1200 mg 3 times a day)\par  to discussed with the patient on next follow-up visit\par \par  3. muscle cramping\par  no fasciculations noted on exam\par  EMG nerve conduction study showed no evidence for MND\par  recommendations\par 1.  proper hydration, Tonic water,\par 2. Stretching exercise,\par 3. B12 continue 200 mcg QD\par 4. Vit. D 2000 IU daily, \par 5.  leg cramp reliever OTC  every 6 hours as needed during the day,\par  leg cramp reliever p.m. if nocturnal cramping occurs.\par \par  all recommendation to discussed with the patient in detail in follow-up visit in 2 weeks or earlier as needed\par \par  discussed with the patient in detail neurological exam findings, provided reassurance\par  spent 60 minutes with evaluation, counseling, answering multiple questions, reassurance and education

## 2023-04-07 NOTE — ASSESSMENT
[FreeTextEntry1] : 58 female who is being seen for MGUS - IgM kappa, medical history significant for surgery for thyroid cancer about a year ago, otherwise negative\par She has no anemia, thrombocytopenia, hypercalcemia, renal dysfunction\par \par 11/21/22 LORNA - weak IgM kappa\par 11/22/22 LORNA - polyclonal increase detected in one or more immunoglobulins. \par 12/22/22 SPEP - M spike -unable to quantitate / weak gamma-migrating protein.  LORNA -weak IgM kappa\par UPEP - no monoclonal protein / urine LORNA - no monoclonal band\par FLC normal, FLC ratio - normal\par MAG - antibodies negative. \par \par Previously, reviewed with patient the negative soft tissue biopsy from 2/7/23. No evidence of amyloidosis. \par \par Today, reviewed: \par 3/15/23\par SPEP -  weak IgM kappa band identified / M-spike:  Weak Gamma-Migrating Paraprotein Identified\par LORNA - weak IgM kappa\par FLC normal, FLC ratio - normal\par 03/23/2023 SpO2 94-95%, feels cold fingertips. Repeat O2 sat after warming the hand was 97% RA. \par \par \par \par Plan:\par Immunoelectrophoresis is unchanged. \par Repeat labs in 4 months, follow up visit in 8 months\par \par

## 2023-04-07 NOTE — HISTORY OF PRESENT ILLNESS
[de-identified] : 57 F here for evaluation of elevated IgM kappa.\par \par Pt has muscle and joint issues following up with neurology and rheum. Labs in 11/21/22 showed weak IgM kappa on LORNA.. Previous LORNA x 2did not show any monoclonal band. Labs reviewed showed pt is not anemic, not thrombocytopenic. Normal calcium lvl and normal renal functions. \par \par Pt feels extremely anxious and distressed about the findings. \par \par Medical history significant for surgery for thyroid cancer about a year ago otherwise negative\par \par She is a  \par \par  [de-identified] : 12/12/22: Lory is here to discuss immunoglobulin levels. She feels very anxious about polyclonal increase of IgM. Reassured her that polyclonal elevation can be seen in inflammatory reaction, infection. Unlikely to MM.\par \par 12/30/22: spoke with Lory via TTM. IEP showed weak gamma migrating protein. LORNA with weak kappa band. Mspike unable to quantitiate. Discussed with her that this could be very early MGUS. She has no CRAB symptoms. Her neurological symptoms -numbness/tingling of fingers, toes around the mouth are unlikely related. However, we can do a fat pad bx to r/o amyloidosis. \par \par 1/15/23 Returns for follow up. Notes she hurt her R thumb on 9/23/22, and then developed tingling in both hands. She now feels that she is distinctly "aware of fingers and toes". \par \par 03/23/2023 Returns for follow up visit. S/p steroids injection to right wrist.  In office, SpO2 94-95%, feels cold fingertips. No cough, SOB, or fever. \par \par \par \par

## 2023-04-07 NOTE — HISTORY OF PRESENT ILLNESS
[FreeTextEntry1] : March 30, 2023\par Initial neurological evaluation\par \par  The patient is a 58 years old  right-handed female presents for initial neurological evaluation with multiple complaints.\par   Patient states her symptoms started in September 23, 2022\par At that time she developed sudden onset of right thumb   Burning pain and numbness.  On next day she developed bilateral hands tingling, evaluated by hand Specialist, diagnosed with tendinitis of right thumb, treated with injections.\par Symptom persisted,  following by tingling sensation that occurred randomly involving lower extremities, face muscles.\par She was evaluated by neurologist and underwent multiple diagnostic testing including EMG and nerve conduction studies, brain and cervical, thoracic and lumbar MRIs.\par  According to patient all results became normal,\par  later in January 2023 she develops bilateral toes and soles pain, pain from soles radiated to toes, increase in intensity, occurs intermittently, describes as neuropathic pain.\par   She describes feeling of on comfort under the skin, states she is supposed to bend forward to release the pain.\par   Also she reports cramping in muscles, maximum in left calf,  right foot,  cramping occurs in toes as well,  at times severe.\par -random muscle jumping,  cramping.  Describes no fasciculation\par - intermittent low back pain, not radiating at present time, no exacerbation at present time\par \par  patient states she was evaluated by rheumatologist and autoimmune conditions ruled out\par Diagnosed with MGUS \par  she denies frequent headache, dizziness at rest, visual changes, diplopia, dysarthria, dysphagia, new focal weakness.\par -  tingling sensation with burning component on sides of tights\par - reports in 2014 she had severe low back pain and was not able to ambulate\par -   history of headache,  prominent in the past, no   Frequent headache lately\par \par .\par   She denies urinary dysfunction, bowel dysfunction, perineal sensory loss, confusion, unsteady gait.\par   Patient states that she is physically active and she works up to 2 miles every day.\par   She denies history of Lyme disease, sexually transmitted disease, history of traumatic brain injury, history of CNS infection.\par   Patient denies family history of similar conditions, denies family history of neurological condition\par  patient states she has to be positive that she does not have "  serious neurological condition as MS"\par \par \par  Diagnostic workup performed in the past reviewed in detail\par \par 1. MRI right thumb  February 8, 2023\par  mild osteoarthritic changes at the interphalangeal joint of the thumb and small joint effusion.  Mild surrounding soft tissue edema.\par   Small effusion at the 1st MP and CMC  joints\par  mild subcutaneous edema also seen at the radial margin of the thumb were at the level of MP joint\par  no focal soft tissue mass\par \par 2.  MRI cervical spine  November 18, 2022\par  degenerative changes with foraminal narrowing.  No abnormal enhancement\par  degenerative disc disease with disc space narrowing most prominent at C4-5 and C5-6\par  disc osteophyte complex without significant central canal stenosis, uncovertebral arthropathy result in mild right foraminal narrowing at C4-5\par  disc osteophyte complex without significant central canal stenosis.  Uncovertebral arthropathy results in moderate right mild left foraminal narrowing at C5-6\par  C6-7 there is a broad-based disc herniation without central canal stenosis\par \par 3.  MRI thoracic spine with and without contrast November 18, 2022\par  mild T3-T4 foraminal narrowing\par  no evidence for demyelinating lesions in the thoracic spine cord\par  mild degenerative changes without significant spinal canal stenosis or nerve root compression.\par \par  4. Laboratory data  march-February 2023 personally reviewed in detail\par  vitamin-D 53.2\par  B12 1221,  folate more than 20, \par  liver functional test, ALT 9 lobe normal, AST 22, alkaline phosphatase 103\par  lactate 177\par   electrophoresis serum, within normal limits, see full report\par  C-reactive protein less than 3, ESR 15\par  heavy metal screening, negative lead 1.3, mercury less than 1, CADmin less than 0.5\par \par  additionally\par COVID-19 positive in  April 2020, \par \par Supplement list reviewed in detail\par  calcium 600 mg twice a day\par  magnesium 400 mg once a day\par  fish oil 1200 mg twice a day\par  B complex daily\par  vitamin B12 1000 mcg daily\par  daily vitamin D3 25 mcg daily\par  turmeric 720 mg twice a day \par  probiotics  twice a day\par  500 mg daily vitamin-C\par

## 2023-04-07 NOTE — ADDENDUM
[FreeTextEntry1] : Documented by Ting Rondon acting as scribe for Dr. Rankin on 03/23/2023.\par \par All Medical record entries made by the Scribe were at my, Dr. Rankin, direction and personally dictated by me on 03/23/2023. I have reviewed the chart and agree that the record accurately reflects my personal performance of the history, physical exam, assessment and plan. I have also personally directed, reviewed, and agreed with the discharge instructions. \par \par 4/6/23 Note edited to reflect updated ACTIVE PROBLEM LIST which previously showed an incorrect diagnosis, which has since been removed.

## 2023-04-07 NOTE — PHYSICAL EXAM
[General Appearance - Alert] : alert [General Appearance - In No Acute Distress] : in no acute distress [General Appearance - Well Nourished] : well nourished [General Appearance - Well Developed] : well developed [Oriented To Time, Place, And Person] : oriented to person, place, and time [Impaired Insight] : insight and judgment were intact [Affect] : the affect was normal [Memory Recent] : recent memory was not impaired [Memory Remote] : remote memory was not impaired [Person] : oriented to person [Place] : oriented to place [Time] : oriented to time [Short Term Intact] : short term memory intact [Remote Intact] : remote memory intact [Span Intact] : the attention span was normal [Concentration Intact] : normal concentrating ability [Visual Intact] : visual attention was ~T not ~L decreased [Naming Objects] : no difficulty naming common objects [Repeating Phrases] : no difficulty repeating a phrase [Writing A Sentence] : no difficulty writing a sentence [Fluency] : fluency intact [Comprehension] : comprehension intact [Reading] : reading intact [Current Events] : adequate knowledge of current events [Past History] : adequate knowledge of personal past history [Cranial Nerves Optic (II)] : visual acuity intact bilaterally,  visual fields full to confrontation, pupils equal round and reactive to light [Cranial Nerves Oculomotor (III)] : extraocular motion intact [Cranial Nerves Trigeminal (V)] : facial sensation intact symmetrically [Cranial Nerves Facial (VII)] : face symmetrical [Cranial Nerves Vestibulocochlear (VIII)] : hearing was intact bilaterally [Cranial Nerves Glossopharyngeal (IX)] : tongue and palate midline [Cranial Nerves Hypoglossal (XII)] : there was no tongue deviation with protrusion [Cranial Nerves Accessory (XI - Cranial And Spinal)] : head turning and shoulder shrug symmetric [Motor Strength] : muscle strength was normal in all four extremities [Involuntary Movements] : no involuntary movements were seen [No Muscle Atrophy] : normal bulk in all four extremities [Motor Handedness Right-Handed] : the patient is right hand dominant [Sensation Tactile Decrease] : light touch was intact [Sensation Vibration Decrease] : vibration was intact [Proprioception] : proprioception was intact [Balance] : balance was intact [2+] : Ankle jerk left 2+ [Sclera] : the sclera and conjunctiva were normal [PERRL With Normal Accommodation] : pupils were equal in size, round, reactive to light, with normal accommodation [Extraocular Movements] : extraocular movements were intact [Optic Disc Abnormality] : the optic disc were normal in size and color [No APD] : no afferent pupillary defect [No OJ] : no internuclear ophthalmoplegia [Full Visual Field] : full visual field [Outer Ear] : the ears and nose were normal in appearance [Oropharynx] : the oropharynx was normal [Neck Appearance] : the appearance of the neck was normal [Neck Cervical Mass (___cm)] : no neck mass was observed [Jugular Venous Distention Increased] : there was no jugular-venous distention [Thyroid Diffuse Enlargement] : the thyroid was not enlarged [Thyroid Nodule] : there were no palpable thyroid nodules [Auscultation Breath Sounds / Voice Sounds] : lungs were clear to auscultation bilaterally [Heart Rate And Rhythm] : heart rate was normal and rhythm regular [Heart Sounds] : normal S1 and S2 [Heart Sounds Gallop] : no gallops [Murmurs] : no murmurs [Heart Sounds Pericardial Friction Rub] : no pericardial rub [Full Pulse] : the pedal pulses are present [Edema] : there was no peripheral edema [Bowel Sounds] : normal bowel sounds [Abdomen Soft] : soft [Abdomen Tenderness] : non-tender [Abdomen Mass (___ Cm)] : no abdominal mass palpated [No CVA Tenderness] : no ~M costovertebral angle tenderness [No Spinal Tenderness] : no spinal tenderness [Abnormal Walk] : normal gait [Nail Clubbing] : no clubbing  or cyanosis of the fingernails [Musculoskeletal - Swelling] : no joint swelling seen [Motor Tone] : muscle strength and tone were normal [Skin Color & Pigmentation] : normal skin color and pigmentation [Skin Turgor] : normal skin turgor [] : no rash [FreeTextEntry1] :  anxious during interview [Allodynia] : no ~T allodynia present [Dysesthesia] : no dysesthesia [Hyperesthesia] : no hyperesthesia [Past-pointing] : there was no past-pointing [Tremor] : no tremor present [Dysdiadochokinesia Bilaterally] : not present [Plantar Reflex Right Only] : normal on the right [Plantar Reflex Left Only] : normal on the left [FreeTextEntry4] :  mini-mental status exam 30/2 [FreeTextEntry6] :  5/5 in all muscle groups [FreeTextEntry9] :  symmetrical deep tendon reflexes, no pathological signs,  plantar downgoing bilaterally

## 2023-04-17 ENCOUNTER — TRANSCRIPTION ENCOUNTER (OUTPATIENT)
Age: 58
End: 2023-04-17

## 2023-04-24 LAB
T3 SERPL-MCNC: 145 NG/DL
T4 FREE SERPL-MCNC: 1.1 NG/DL
T4 SERPL-MCNC: 7.9 UG/DL
TSH SERPL-ACNC: 1.45 UIU/ML

## 2023-04-26 ENCOUNTER — APPOINTMENT (OUTPATIENT)
Dept: ENDOCRINOLOGY | Facility: CLINIC | Age: 58
End: 2023-04-26
Payer: COMMERCIAL

## 2023-04-26 VITALS
SYSTOLIC BLOOD PRESSURE: 112 MMHG | OXYGEN SATURATION: 99 % | HEIGHT: 63 IN | HEART RATE: 78 BPM | BODY MASS INDEX: 18.25 KG/M2 | WEIGHT: 103 LBS | DIASTOLIC BLOOD PRESSURE: 72 MMHG

## 2023-04-26 PROCEDURE — 99214 OFFICE O/P EST MOD 30 MIN: CPT

## 2023-04-27 ENCOUNTER — TRANSCRIPTION ENCOUNTER (OUTPATIENT)
Age: 58
End: 2023-04-27

## 2023-05-02 LAB
BRUCELLA AB IGG, EIA: NEGATIVE
BRUCELLA AB IGM, EIA: NEGATIVE

## 2023-05-03 ENCOUNTER — APPOINTMENT (OUTPATIENT)
Dept: INFECTIOUS DISEASE | Facility: CLINIC | Age: 58
End: 2023-05-03
Payer: COMMERCIAL

## 2023-05-03 VITALS
OXYGEN SATURATION: 98 % | BODY MASS INDEX: 18.25 KG/M2 | RESPIRATION RATE: 18 BRPM | WEIGHT: 103 LBS | DIASTOLIC BLOOD PRESSURE: 68 MMHG | SYSTOLIC BLOOD PRESSURE: 106 MMHG | TEMPERATURE: 98.9 F | HEART RATE: 76 BPM

## 2023-05-03 LAB
A FLAVUS AB FLD QL: NEGATIVE
A FUMIGATUS AB FLD QL: NEGATIVE
A NIGER AB FLD QL: NEGATIVE
A NIGER IGE QN: <0.1 KUA/L
B HENSELAE IGG SER-ACNC: NEGATIVE TITER
B HENSELAE IGM SER QL: NEGATIVE TITER
B QUINTANA IGG SER QL: NEGATIVE TITER
B QUINTANA IGM SER QL: NEGATIVE TITER
BARTONELLA PCR: NEGATIVE
C BURNET PH1 + PH2 IGG+IGM SER-IMP: NORMAL
C BURNET PH1 IGG TITR FLD: NORMAL
C BURNET PH1 IGG TITR FLD: NORMAL
C BURNET PH1 IGM TITR FLD: NORMAL
C BURNET PH1 IGM TITR FLD: NORMAL
DEPRECATED A NIGER IGE RAST QL: 0
GALACTOMANNAN AG SERPL QL IA: 0.09 INDEX
T GONDII AB SER-IMP: NEGATIVE
T GONDII IGG SER QL: <3 IU/ML

## 2023-05-03 PROCEDURE — 99215 OFFICE O/P EST HI 40 MIN: CPT

## 2023-05-03 NOTE — PHYSICAL EXAM
[General Appearance - Alert] : alert [General Appearance - In No Acute Distress] : in no acute distress [General Appearance - Well-Appearing] : healthy appearing [Sclera] : the sclera and conjunctiva were normal [Hearing Threshold Finger Rub Not [place]] : hearing was normal [Neck Appearance] : the appearance of the neck was normal [Edema] : there was no peripheral edema [Abdomen Soft] : soft [FreeTextEntry1] : deferred [Musculoskeletal - Swelling] : no joint swelling [Skin Color & Pigmentation] : normal skin color and pigmentation [] : no rash [No Focal Deficits] : no focal deficits [Affect] : the affect was normal

## 2023-05-03 NOTE — ASSESSMENT
[FreeTextEntry1] : 58-year-old female presents for evaluation for an neurologic complaints of paresthesias electric shock type sensation and polyclonal M spike. Given her work as a  and history of cat scratches we ruled out potential zoonoses. I discussed with her that 16 S ribosomal RNA testing may have been helpful but it is not performed on blood. . I discussed with her since all testing is negative we could do a potential trial of antibiotics to target Bartonella and tickborne disease, she will think about it. I also discussed with her that usually Bartonella leads to neuroretinitis and that she has recently been been to an ophthalmologist and had no evidence of this.\par \par Discussed with her in extensive detail and went over her supplement list with her as well.  I discussed with her that it is possible a vitamin deficiency may be causing the cramping that she is experiencing especially as her symptoms have improved with introduction of the supplements.  I advised that with her next blood work she could check levels of magnesium with it and that she should also check vitamin A level as she is taking exogenous vitamin A.  I also recommended she visit the vitamin store and see if some of these supplements can be consolidated.  She is also taking fish oil and turmeric and I advised her that both have blood thinning capability so they should not be taken more than directed.\par \par Based on the extensive testing there does not appear to be an infectious etiology to her symptoms at this time.\par \par #Paresthesia, electric shock type pain, cat scratch\par –  Bartonella PCR and antibodies (discussed with her I cannot order through [employer]) negative\par – toxoplasma PCR and antibodies, negative\par – Q fever antibodies negative\par –  Brucella antibodies and culture negative\par - Aspergillus antigen and antibody test negative\par – Offered her empiric course of doxycycline to target Bartonella and tickborne disease it would be 100 every 12 hours for 14 days she says she will think about it and is not sure at this time, I agree as there is no specific target based on the blood work, she will call office if she wishes to trial this\par – She will ask PCP to check her levels of magnesium and vitamin A\par – Regarding area of unilateral shoulder pain I advised she can ask her PCP for potential x-ray\par – Return to office if needed currently no further work-up\par \par \par \par  [Treatment Education] : treatment education [Treatment Adherence] : treatment adherence [Medical Care Issues] : medical care issues [Drug Interactions / Side Effects] : drug interactions/side effects [Anticipatory Guidance] : anticipatory guidance

## 2023-05-03 NOTE — REVIEW OF SYSTEMS
[Fever] : no fever [Eye Pain] : no eye pain [Chills] : no chills [Chest Pain] : no chest pain [Earache] : no earache [Shortness Of Breath] : no shortness of breath [Abdominal Pain] : no abdominal pain [Dysuria] : no dysuria [Joint Pain] : no joint pain [Skin Lesions] : no skin lesions [FreeTextEntry9] : muscle cramping [de-identified] : numbness/tingling intermittent hands

## 2023-05-03 NOTE — DATA REVIEWED
[FreeTextEntry1] : Studies are scanned in summarized as below\par Brucella antibodies and culture, Q fever antibody, Bartonella antibody, Aspergillus antibodies, toxoplasma PCR, Bartonella PCR all negative

## 2023-05-03 NOTE — HISTORY OF PRESENT ILLNESS
[FreeTextEntry1] : In today for follow-up for results review.  Over the past couple of weeks patient has contacted office a few times initially because some blood work was not done by the lab Dr. Cabello rerobertoed.  She also contacted the lab and also the microbiology  as she was concerned that the blood culture did not specifically say Brucella blood culture the lab discussed with her that the blood culture was sufficient to look for Brucella.  I told her that I did agree with this.  She also contacted the  to discuss the lab issues as well.  She also says that she called the office and many times and sometimes she did not receive an answer another time she wanted to speak to one specific person only, the .\par \par At appointment she asked staff to print out all the labs so that she could follow along during the appointment.\par \par She said the tingling feeling that you had been getting in her legs and in her face has actually resolved, but she is still getting cramping intermittently in her legs and in her feet and she also has numbness and tingling at night in her hands.  She says she was diagnosed with carpal tunnel but she does not believe that that is the true culprit for the hand issues.  She says it is because she feels that everything started at 1 systematically.  She also says she has pain near her shoulder and that she has talked to Derm and neurology about this without an answer.\par \par I reviewed all the test results with her they were all negative.  I discussed with her that we could trial doxycycline but she is hesitant to do so and would like to think about it I said that that is reasonable.\par \par She also brought a list of her supplements and we went over that also.\par \par \par Initial evaluation 4/5/23\par 58-year-old female presents to the ID office for evaluation.  She says her symptoms began on September 23, 2022 she hurt her right thumb while doing a specific hand movement after that she started experiencing numbness and tingling she describes this as a pins-and-needles sensation especially during sleep and then this also extended to her other hand.  This sensation then also went to her legs and also to her face.  She now describes electric shock type pains sporadically.  She also has cramping of her toes.  Extensive work-up has been done by multiple specialist.  She has seen neurology rheumatology primary care and hematology.  Also ENT.  She says multiple tests including MRI brain MRI spine CT sinuses have all been okay.  She had an M spike that was polyclonal the reason is not known.  She had an abdominal fat pad biopsy for potential amyloidosis this was also unrevealing.  She saw a rheumatology and was told all work-up was unrevealing.  She is here because she is concerned this may be a Bartonella that has not been picked out or perhaps an other infectious disease.  She is a  and treats dogs and cats only.  She has had multiple scratches from both dogs and cats.  She brought some literature with her.  She says an infectious disease specialist who is also a  has told her that the Bartonella tests available for humans are not all very sensitive or specific.  She has had no fevers or night sweats.\par \par Social history\par Works as a  with cats and dogs including feral cats\par She is born in [place] and grew up on [place].  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  Following that she has been practicing on [place].  Other travel history includes [place] and the [place].

## 2023-05-04 ENCOUNTER — TRANSCRIPTION ENCOUNTER (OUTPATIENT)
Age: 58
End: 2023-05-04

## 2023-05-04 LAB — BACTERIA BLD CULT: NORMAL

## 2023-05-11 ENCOUNTER — APPOINTMENT (OUTPATIENT)
Dept: NEUROLOGY | Facility: CLINIC | Age: 58
End: 2023-05-11
Payer: COMMERCIAL

## 2023-05-11 VITALS
WEIGHT: 103 LBS | HEIGHT: 63 IN | OXYGEN SATURATION: 98 % | HEART RATE: 76 BPM | BODY MASS INDEX: 18.25 KG/M2 | DIASTOLIC BLOOD PRESSURE: 70 MMHG | SYSTOLIC BLOOD PRESSURE: 110 MMHG

## 2023-05-11 PROCEDURE — XXXXX: CPT | Mod: 1L

## 2023-05-12 ENCOUNTER — LABORATORY RESULT (OUTPATIENT)
Age: 58
End: 2023-05-12

## 2023-05-15 LAB
25(OH)D3 SERPL-MCNC: 57.8 NG/ML
ALBUMIN SERPL ELPH-MCNC: 4.6 G/DL
ALP BLD-CCNC: 90 U/L
ALT SERPL-CCNC: 10 U/L
AMYLASE/CREAT SERPL: 99 U/L
ANION GAP SERPL CALC-SCNC: 13 MMOL/L
APPEARANCE: ABNORMAL
AST SERPL-CCNC: 25 U/L
BACTERIA UR CULT: NORMAL
BASOPHILS # BLD AUTO: 0.07 K/UL
BASOPHILS NFR BLD AUTO: 1.5 %
BILIRUB SERPL-MCNC: 0.4 MG/DL
BILIRUBIN URINE: NEGATIVE
BLOOD URINE: NEGATIVE
BUN SERPL-MCNC: 12 MG/DL
CALCIUM SERPL-MCNC: 9.8 MG/DL
CHLORIDE SERPL-SCNC: 100 MMOL/L
CHOLEST SERPL-MCNC: 219 MG/DL
CO2 SERPL-SCNC: 27 MMOL/L
COLOR: YELLOW
CORTIS SERPL-MCNC: 14.9 UG/DL
CREAT SERPL-MCNC: 0.83 MG/DL
CRP SERPL-MCNC: <3 MG/L
EGFR: 82 ML/MIN/1.73M2
EOSINOPHIL # BLD AUTO: 0.08 K/UL
EOSINOPHIL NFR BLD AUTO: 1.7 %
ERYTHROCYTE [SEDIMENTATION RATE] IN BLOOD BY WESTERGREN METHOD: 18 MM/HR
ESTIMATED AVERAGE GLUCOSE: 97 MG/DL
ESTRADIOL SERPL-MCNC: 12 PG/ML
FERRITIN SERPL-MCNC: 33 NG/ML
FOLATE SERPL-MCNC: >20 NG/ML
FSH SERPL-MCNC: 115 IU/L
GLUCOSE QUALITATIVE U: NEGATIVE MG/DL
GLUCOSE SERPL-MCNC: 88 MG/DL
HBA1C MFR BLD HPLC: 5 %
HCT VFR BLD CALC: 39.3 %
HDLC SERPL-MCNC: 88 MG/DL
HGB BLD-MCNC: 12.7 G/DL
IMM GRANULOCYTES NFR BLD AUTO: 0.2 %
INSULIN SERPL-MCNC: 5.7 UU/ML
IRON SATN MFR SERPL: 27 %
IRON SERPL-MCNC: 88 UG/DL
KETONES URINE: ABNORMAL MG/DL
LDLC SERPL CALC-MCNC: 118 MG/DL
LEUKOCYTE ESTERASE URINE: ABNORMAL
LH SERPL-ACNC: 70 IU/L
LPL SERPL-CCNC: 38 U/L
LYMPHOCYTES # BLD AUTO: 1.46 K/UL
LYMPHOCYTES NFR BLD AUTO: 31.3 %
MAGNESIUM SERPL-MCNC: 1.9 MG/DL
MAN DIFF?: NORMAL
MCHC RBC-ENTMCNC: 29.5 PG
MCHC RBC-ENTMCNC: 32.3 GM/DL
MCV RBC AUTO: 91.4 FL
MONOCYTES # BLD AUTO: 0.35 K/UL
MONOCYTES NFR BLD AUTO: 7.5 %
NEUTROPHILS # BLD AUTO: 2.7 K/UL
NEUTROPHILS NFR BLD AUTO: 57.8 %
NITRITE URINE: NEGATIVE
NONHDLC SERPL-MCNC: 131 MG/DL
PH URINE: 8
PLATELET # BLD AUTO: 197 K/UL
POTASSIUM SERPL-SCNC: 4.2 MMOL/L
PROGEST SERPL-MCNC: 0.3 NG/ML
PROT SERPL-MCNC: 7.1 G/DL
PROTEIN URINE: NORMAL MG/DL
RBC # BLD: 4.3 M/UL
RBC # FLD: 12.8 %
SHBG SERPL-SCNC: 140 NMOL/L
SODIUM SERPL-SCNC: 139 MMOL/L
SPECIFIC GRAVITY URINE: 1.02
TESTOST SERPL-MCNC: 21.7 NG/DL
TIBC SERPL-MCNC: 326 UG/DL
TRIGL SERPL-MCNC: 65 MG/DL
UIBC SERPL-MCNC: 239 UG/DL
UROBILINOGEN URINE: 0.2 MG/DL
VIT B12 SERPL-MCNC: 1724 PG/ML
WBC # FLD AUTO: 4.67 K/UL

## 2023-05-18 ENCOUNTER — APPOINTMENT (OUTPATIENT)
Dept: FAMILY MEDICINE | Facility: CLINIC | Age: 58
End: 2023-05-18
Payer: COMMERCIAL

## 2023-05-18 VITALS
HEIGHT: 63 IN | TEMPERATURE: 98 F | WEIGHT: 103 LBS | BODY MASS INDEX: 18.25 KG/M2 | OXYGEN SATURATION: 99 % | HEART RATE: 74 BPM | SYSTOLIC BLOOD PRESSURE: 106 MMHG | DIASTOLIC BLOOD PRESSURE: 67 MMHG

## 2023-05-18 DIAGNOSIS — Z13.0 ENCOUNTER FOR SCREENING FOR OTHER SUSPECTED ENDOCRINE DISORDER: ICD-10-CM

## 2023-05-18 DIAGNOSIS — F44.7 CONVERSION DISORDER WITH MIXED SYMPTOM PRESENTATION: ICD-10-CM

## 2023-05-18 DIAGNOSIS — Z12.39 ENCOUNTER FOR OTHER SCREENING FOR MALIGNANT NEOPLASM OF BREAST: ICD-10-CM

## 2023-05-18 DIAGNOSIS — Z13.1 ENCOUNTER FOR SCREENING FOR DIABETES MELLITUS: ICD-10-CM

## 2023-05-18 DIAGNOSIS — Z13.88 ENCOUNTER FOR SCREENING FOR DISORDER DUE TO EXPOSURE TO CONTAMINANTS: ICD-10-CM

## 2023-05-18 DIAGNOSIS — M25.532 PAIN IN LEFT WRIST: ICD-10-CM

## 2023-05-18 DIAGNOSIS — Z13.228 ENCOUNTER FOR SCREENING FOR OTHER SUSPECTED ENDOCRINE DISORDER: ICD-10-CM

## 2023-05-18 DIAGNOSIS — M72.0 PALMAR FASCIAL FIBROMATOSIS [DUPUYTREN]: ICD-10-CM

## 2023-05-18 DIAGNOSIS — Z23 ENCOUNTER FOR IMMUNIZATION: ICD-10-CM

## 2023-05-18 DIAGNOSIS — Z13.21 ENCOUNTER FOR SCREENING FOR NUTRITIONAL DISORDER: ICD-10-CM

## 2023-05-18 DIAGNOSIS — M25.531 PAIN IN RIGHT WRIST: ICD-10-CM

## 2023-05-18 DIAGNOSIS — M79.642 PAIN IN LEFT HAND: ICD-10-CM

## 2023-05-18 DIAGNOSIS — Z13.220 ENCOUNTER FOR SCREENING FOR LIPOID DISORDERS: ICD-10-CM

## 2023-05-18 DIAGNOSIS — M85.80 OTHER SPECIFIED DISORDERS OF BONE DENSITY AND STRUCTURE, UNSPECIFIED SITE: ICD-10-CM
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
## 2023-05-18 DIAGNOSIS — Z12.83 ENCOUNTER FOR SCREENING FOR MALIGNANT NEOPLASM OF SKIN: ICD-10-CM

## 2023-05-18 DIAGNOSIS — M25.50 PAIN IN UNSPECIFIED JOINT: ICD-10-CM

## 2023-05-18 DIAGNOSIS — Z13.29 ENCOUNTER FOR SCREENING FOR OTHER SUSPECTED ENDOCRINE DISORDER: ICD-10-CM

## 2023-05-18 DIAGNOSIS — Z20.822 CONTACT WITH AND (SUSPECTED) EXPOSURE TO COVID-19: ICD-10-CM

## 2023-05-18 DIAGNOSIS — M18.10 UNILATERAL PRIMARY OSTEOARTHRITIS OF FIRST CARPOMETACARPAL JOINT, UNSPECIFIED HAND: ICD-10-CM

## 2023-05-18 DIAGNOSIS — R52 PAIN, UNSPECIFIED: ICD-10-CM

## 2023-05-18 LAB
ALUMINUM SERPL-MCNC: 6 UG/L
MERCURY BLD-MCNC: <1 UG/L
VIT A SERPL-MCNC: 31.6 UG/DL

## 2023-05-18 PROCEDURE — 99396 PREV VISIT EST AGE 40-64: CPT

## 2023-05-18 NOTE — ASSESSMENT
[FreeTextEntry1] : HCM:\par - Discussed recent blood work\par - low normal Mg - will monitor\par - B12 high - reduce supplement \par - Mammogram: breast MRI 9/2021 - normal.\par - PAP Smear: 11/2/2021. Patient reported PAP Smear was normal.\par - Bone Density: 8/2021. Patient reported bone density results were abnormal. osteopenia.\par - Colonoscopy: 5/2017. Patient reported colonoscopy was normal\par - follows with cardiology so no need for EKG in office today\par \par # Paresthesia, electric shock type pain\par - all improving \par \par Thumb pain \par - improved with injection by ortho\par

## 2023-05-18 NOTE — PHYSICAL EXAM
[No Acute Distress] : no acute distress [Well Nourished] : well nourished [Well Developed] : well developed [Well-Appearing] : well-appearing [Normal Sclera/Conjunctiva] : normal sclera/conjunctiva [PERRL] : pupils equal round and reactive to light [Normal Outer Ear/Nose] : the outer ears and nose were normal in appearance [Normal Oropharynx] : the oropharynx was normal [No Lymphadenopathy] : no lymphadenopathy [Supple] : supple [No Respiratory Distress] : no respiratory distress  [No Accessory Muscle Use] : no accessory muscle use [Clear to Auscultation] : lungs were clear to auscultation bilaterally [Normal Rate] : normal rate  [Regular Rhythm] : with a regular rhythm [Normal S1, S2] : normal S1 and S2 [No Murmur] : no murmur heard [Pedal Pulses Present] : the pedal pulses are present [No Edema] : there was no peripheral edema [Soft] : abdomen soft [Non Tender] : non-tender [Non-distended] : non-distended [Normal Bowel Sounds] : normal bowel sounds [Normal Anterior Cervical Nodes] : no anterior cervical lymphadenopathy [Normal Posterior Cervical Nodes] : no posterior cervical lymphadenopathy [Grossly Normal Strength/Tone] : grossly normal strength/tone [No Rash] : no rash [No Focal Deficits] : no focal deficits [Normal Gait] : normal gait [Normal Affect] : the affect was normal [Normal Insight/Judgement] : insight and judgment were intact [Normal TMs] : both tympanic membranes were normal [Alert and Oriented x3] : oriented to person, place, and time [de-identified] : hypertonicity of left sided mid thoracic paraspinal muscle

## 2023-05-18 NOTE — HEALTH RISK ASSESSMENT
[Good] : ~his/her~  mood as  good [No falls in past year] : Patient reported no falls in the past year [0] : 2) Feeling down, depressed, or hopeless: Not at all (0) [PHQ-2 Negative - No further assessment needed] : PHQ-2 Negative - No further assessment needed [Patient reported mammogram was normal] : Patient reported mammogram was normal [Patient reported PAP Smear was normal] : Patient reported PAP Smear was normal [Patient reported bone density results were normal] : Patient reported bone density results were normal [Patient reported colonoscopy was normal] : Patient reported colonoscopy was normal [Fully functional (bathing, dressing, toileting, transferring, walking, feeding)] : Fully functional (bathing, dressing, toileting, transferring, walking, feeding) [Fully functional (using the telephone, shopping, preparing meals, housekeeping, doing laundry, using] : Fully functional and needs no help or supervision to perform IADLs (using the telephone, shopping, preparing meals, housekeeping, doing laundry, using transportation, managing medications and managing finances) [Seat Belt] :  uses seat belt [Never] : Never [No] : In the past 12 months have you used drugs other than those required for medical reasons? No [Employed] : employed [Patient/Caregiver not ready to engage] : , patient/caregiver not ready to engage [[id number]] : 0 [Change in mental status noted] : No change in mental status noted [Reports changes in hearing] : Reports no changes in hearing [Reports changes in vision] : Reports no changes in vision [Reports normal functional visual acuity (ie: able to read med bottle)] : Reports poor functional visual acuity.  [Reports changes in dental health] : Reports no changes in dental health [MammogramDate] : breast MRI 9/2021 . [MammogramComments] : Mammogram: breast MRI 9/2021 .  [PapSmearDate] : 11/2/2021 [PapSmearComments] : normal  [BoneDensityDate] :  8/2021. [BoneDensityComments] : abnormal, osteopenia  [ColonoscopyDate] : 5/2017

## 2023-05-18 NOTE — HISTORY OF PRESENT ILLNESS
[FreeTextEntry1] : annual physical  [de-identified] : Patient is here today for a physical. Overall she is doing well. PMHx of partial thyroidectomy. (left upper lobe removed) . Reports she is here to discuss her recent blood work results. No acute complaint\par \par HCM:Mammogram: breast MRI 9/2021 - normal.\par PAP Smear: 11/2/2021. Patient reported PAP Smear was normal.\par Bone Density: 8/2021. Patient reported bone density results were abnormal. osteopenia.\par Colonoscopy: 5/2017. Patient reported colonoscopy was normal.

## 2023-05-22 ENCOUNTER — TRANSCRIPTION ENCOUNTER (OUTPATIENT)
Age: 58
End: 2023-05-22

## 2023-05-22 LAB — DHEA-SULFATE, SERUM: 48 UG/DL

## 2023-05-25 ENCOUNTER — APPOINTMENT (OUTPATIENT)
Dept: NEUROLOGY | Facility: CLINIC | Age: 58
End: 2023-05-25

## 2023-05-30 ENCOUNTER — TRANSCRIPTION ENCOUNTER (OUTPATIENT)
Age: 58
End: 2023-05-30

## 2023-05-30 DIAGNOSIS — R92.2 INCONCLUSIVE MAMMOGRAM: ICD-10-CM

## 2023-05-30 DIAGNOSIS — Z98.82 BREAST IMPLANT STATUS: ICD-10-CM

## 2023-05-30 DIAGNOSIS — N63.0 UNSPECIFIED LUMP IN UNSPECIFIED BREAST: ICD-10-CM

## 2023-06-23 ENCOUNTER — NON-APPOINTMENT (OUTPATIENT)
Age: 58
End: 2023-06-23

## 2023-06-26 ENCOUNTER — TRANSCRIPTION ENCOUNTER (OUTPATIENT)
Age: 58
End: 2023-06-26

## 2023-06-27 ENCOUNTER — TRANSCRIPTION ENCOUNTER (OUTPATIENT)
Age: 58
End: 2023-06-27

## 2023-06-28 ENCOUNTER — TRANSCRIPTION ENCOUNTER (OUTPATIENT)
Age: 58
End: 2023-06-28

## 2023-06-29 ENCOUNTER — TRANSCRIPTION ENCOUNTER (OUTPATIENT)
Age: 58
End: 2023-06-29

## 2023-07-12 ENCOUNTER — TRANSCRIPTION ENCOUNTER (OUTPATIENT)
Age: 58
End: 2023-07-12

## 2023-07-13 ENCOUNTER — TRANSCRIPTION ENCOUNTER (OUTPATIENT)
Age: 58
End: 2023-07-13

## 2023-07-14 ENCOUNTER — LABORATORY RESULT (OUTPATIENT)
Age: 58
End: 2023-07-14

## 2023-07-17 LAB — B2 MICROGLOB SERPL-MCNC: 1.9 MG/L

## 2023-07-20 ENCOUNTER — NON-APPOINTMENT (OUTPATIENT)
Age: 58
End: 2023-07-20

## 2023-07-20 ENCOUNTER — APPOINTMENT (OUTPATIENT)
Dept: FAMILY MEDICINE | Facility: CLINIC | Age: 58
End: 2023-07-20
Payer: COMMERCIAL

## 2023-07-20 VITALS
DIASTOLIC BLOOD PRESSURE: 62 MMHG | WEIGHT: 103 LBS | BODY MASS INDEX: 18.25 KG/M2 | SYSTOLIC BLOOD PRESSURE: 117 MMHG | TEMPERATURE: 99.3 F | OXYGEN SATURATION: 98 % | HEART RATE: 80 BPM

## 2023-07-20 DIAGNOSIS — Z13.21 ENCOUNTER FOR SCREENING FOR NUTRITIONAL DISORDER: ICD-10-CM

## 2023-07-20 PROCEDURE — 99214 OFFICE O/P EST MOD 30 MIN: CPT | Mod: 25

## 2023-07-20 PROCEDURE — 93000 ELECTROCARDIOGRAM COMPLETE: CPT

## 2023-07-20 NOTE — ASSESSMENT
[Patient Optimized for Surgery] : Patient optimized for surgery [No Further Testing Recommended] : no further testing recommended [As per surgery] : as per surgery [FreeTextEntry4] : METS >4. Patient is medically optimized for low risk procedure with routine hemodynamic. EKG within normal limits, NSR, no significant change from prior.

## 2023-07-20 NOTE — PHYSICAL EXAM
[No Acute Distress] : no acute distress [Well Nourished] : well nourished [Well Developed] : well developed [Well-Appearing] : well-appearing [Normal Sclera/Conjunctiva] : normal sclera/conjunctiva [PERRL] : pupils equal round and reactive to light [EOMI] : extraocular movements intact [No Respiratory Distress] : no respiratory distress  [No Accessory Muscle Use] : no accessory muscle use [Clear to Auscultation] : lungs were clear to auscultation bilaterally [Normal Rate] : normal rate  [Regular Rhythm] : with a regular rhythm [Normal S1, S2] : normal S1 and S2 [No Murmur] : no murmur heard [No Edema] : there was no peripheral edema [No Extremity Clubbing/Cyanosis] : no extremity clubbing/cyanosis [Soft] : abdomen soft [Non Tender] : non-tender [Non-distended] : non-distended [Normal Bowel Sounds] : normal bowel sounds [Grossly Normal Strength/Tone] : grossly normal strength/tone [No Rash] : no rash [No Focal Deficits] : no focal deficits [Normal Gait] : normal gait [Normal Affect] : the affect was normal [Normal Insight/Judgement] : insight and judgment were intact [Alert and Oriented x3] : oriented to person, place, and time

## 2023-07-20 NOTE — HISTORY OF PRESENT ILLNESS
[No Pertinent Cardiac History] : no history of aortic stenosis, atrial fibrillation, coronary artery disease, recent myocardial infarction, or implantable device/pacemaker [No Pertinent Pulmonary History] : no history of asthma, COPD, sleep apnea, or smoking [No Adverse Anesthesia Reaction] : no adverse anesthesia reaction in self or family member [(Patient denies any chest pain, claudication, dyspnea on exertion, orthopnea, palpitations or syncope)] : Patient denies any chest pain, claudication, dyspnea on exertion, orthopnea, palpitations or syncope [Excellent (>10 METs)] : Excellent (>10 METs) [Chronic Anticoagulation] : no chronic anticoagulation [Chronic Kidney Disease] : no chronic kidney disease [Diabetes] : no diabetes [FreeTextEntry1] : colonoscopy/endoscopy [FreeTextEntry2] : August [FreeTextEntry4] : The patient is a 57 y/o F with no pertinent pmhx who presents for medical optimization prior to surgery. The patient is scheduled for colonoscopy/endoscopy in August. The patient denies chest pain, shortness of breath or palpitations. METS > 4. No prior anesthesia action. Patient takes supplements, gastroenterologist has given patient list of supplemental that need to be stopped 1 week prior and she is aware and will do stop taking such medications.

## 2023-07-21 ENCOUNTER — TRANSCRIPTION ENCOUNTER (OUTPATIENT)
Age: 58
End: 2023-07-21

## 2023-07-21 ENCOUNTER — NON-APPOINTMENT (OUTPATIENT)
Age: 58
End: 2023-07-21

## 2023-08-02 ENCOUNTER — TRANSCRIPTION ENCOUNTER (OUTPATIENT)
Age: 58
End: 2023-08-02

## 2023-08-23 NOTE — H&P PST ADULT - CONSTITUTIONAL
Evan Gerardo,     This is Ez Crook and I am a PA who is covering for Dr. Claudio who is currently out of the office. I had a chance to review your recent ECHO results and am happy to report that you have a normal ejection fraction.  Normal ventricular size.  Small amount of mitral valve regurgitation as well as aortic sclerosis.  Something you and Dr. Claudio can keep an eye on going forward.  Not likely the cause for your shortness of breath or hypertension.    Let me know if you have any questions or concerns,     Ez Crook PA-C on 8/23/2023 at 12:58 PM   detailed exam

## 2023-08-25 ENCOUNTER — NON-APPOINTMENT (OUTPATIENT)
Age: 58
End: 2023-08-25

## 2023-08-25 ENCOUNTER — APPOINTMENT (OUTPATIENT)
Dept: OPHTHALMOLOGY | Facility: CLINIC | Age: 58
End: 2023-08-25
Payer: COMMERCIAL

## 2023-08-25 PROCEDURE — 99214 OFFICE O/P EST MOD 30 MIN: CPT

## 2023-08-25 PROCEDURE — 92285 EXTERNAL OCULAR PHOTOGRAPHY: CPT

## 2023-08-29 ENCOUNTER — APPOINTMENT (OUTPATIENT)
Dept: OPHTHALMOLOGY | Facility: CLINIC | Age: 58
End: 2023-08-29

## 2023-09-01 ENCOUNTER — NON-APPOINTMENT (OUTPATIENT)
Age: 58
End: 2023-09-01

## 2023-09-01 ENCOUNTER — APPOINTMENT (OUTPATIENT)
Dept: OPHTHALMOLOGY | Facility: CLINIC | Age: 58
End: 2023-09-01
Payer: COMMERCIAL

## 2023-09-01 PROCEDURE — 92285 EXTERNAL OCULAR PHOTOGRAPHY: CPT

## 2023-09-01 PROCEDURE — 68115 EXC LES CONJUNCTIVA >1 CM: CPT | Mod: LT

## 2023-09-06 ENCOUNTER — TRANSCRIPTION ENCOUNTER (OUTPATIENT)
Age: 58
End: 2023-09-06

## 2023-09-12 ENCOUNTER — APPOINTMENT (OUTPATIENT)
Dept: CARDIOLOGY | Facility: CLINIC | Age: 58
End: 2023-09-12

## 2023-09-12 ENCOUNTER — APPOINTMENT (OUTPATIENT)
Dept: OPHTHALMOLOGY | Facility: CLINIC | Age: 58
End: 2023-09-12
Payer: COMMERCIAL

## 2023-09-12 ENCOUNTER — NON-APPOINTMENT (OUTPATIENT)
Age: 58
End: 2023-09-12

## 2023-09-12 PROCEDURE — 99213 OFFICE O/P EST LOW 20 MIN: CPT

## 2023-09-29 ENCOUNTER — APPOINTMENT (OUTPATIENT)
Dept: OBGYN | Facility: CLINIC | Age: 58
End: 2023-09-29
Payer: COMMERCIAL

## 2023-09-29 VITALS
DIASTOLIC BLOOD PRESSURE: 66 MMHG | SYSTOLIC BLOOD PRESSURE: 116 MMHG | BODY MASS INDEX: 18.25 KG/M2 | WEIGHT: 103 LBS | HEIGHT: 63 IN

## 2023-09-29 PROCEDURE — 36415 COLL VENOUS BLD VENIPUNCTURE: CPT

## 2023-09-29 PROCEDURE — 99396 PREV VISIT EST AGE 40-64: CPT

## 2023-10-02 LAB
B-HEM STREP SPEC QL CULT: NORMAL
BACTERIA UR CULT: NORMAL
C TRACH RRNA SPEC QL NAA+PROBE: NOT DETECTED
CANDIDA VAG CYTO: NOT DETECTED
G VAGINALIS+PREV SP MTYP VAG QL MICRO: NOT DETECTED
HBV SURFACE AG SER QL: NONREACTIVE
HCV AB SER QL: NONREACTIVE
HCV S/CO RATIO: 0.2 S/CO
HIV1+2 AB SPEC QL IA.RAPID: NONREACTIVE
HPV HIGH+LOW RISK DNA PNL CVX: NOT DETECTED
N GONORRHOEA RRNA SPEC QL NAA+PROBE: NOT DETECTED
SOURCE AMPLIFICATION: NORMAL
T PALLIDUM AB SER QL IA: NEGATIVE
T VAGINALIS VAG QL WET PREP: NOT DETECTED

## 2023-10-03 ENCOUNTER — TRANSCRIPTION ENCOUNTER (OUTPATIENT)
Age: 58
End: 2023-10-03

## 2023-10-04 LAB
APPEARANCE: CLEAR
BACTERIA: NEGATIVE /HPF
BILIRUBIN URINE: NEGATIVE
BLOOD URINE: NEGATIVE
CAST: 0 /LPF
COLOR: YELLOW
EPITHELIAL CELLS: 0 /HPF
GLUCOSE QUALITATIVE U: NEGATIVE MG/DL
KETONES URINE: NEGATIVE MG/DL
LEUKOCYTE ESTERASE URINE: NEGATIVE
MICROSCOPIC-UA: NORMAL
NITRITE URINE: NEGATIVE
PH URINE: 8
PROTEIN URINE: NEGATIVE MG/DL
RED BLOOD CELLS URINE: 0 /HPF
SPECIFIC GRAVITY URINE: 1
UROBILINOGEN URINE: 0.2 MG/DL
WHITE BLOOD CELLS URINE: 0 /HPF

## 2023-10-05 LAB — CYTOLOGY CVX/VAG DOC THIN PREP: ABNORMAL

## 2023-10-06 ENCOUNTER — APPOINTMENT (OUTPATIENT)
Dept: OPHTHALMOLOGY | Facility: CLINIC | Age: 58
End: 2023-10-06
Payer: COMMERCIAL

## 2023-10-06 ENCOUNTER — NON-APPOINTMENT (OUTPATIENT)
Age: 58
End: 2023-10-06

## 2023-10-06 PROCEDURE — 99199 UNLISTED SPECIAL SVC PX/RPRT: CPT | Mod: NC

## 2023-10-09 LAB
MYCOPLASMA HOMINIS CULTURE: NEGATIVE
UREAPLASMA CULTURE: NEGATIVE

## 2023-10-10 ENCOUNTER — TRANSCRIPTION ENCOUNTER (OUTPATIENT)
Age: 58
End: 2023-10-10

## 2023-10-13 ENCOUNTER — TRANSCRIPTION ENCOUNTER (OUTPATIENT)
Age: 58
End: 2023-10-13

## 2023-10-19 ENCOUNTER — APPOINTMENT (OUTPATIENT)
Dept: DERMATOLOGY | Facility: CLINIC | Age: 58
End: 2023-10-19
Payer: COMMERCIAL

## 2023-10-19 PROCEDURE — 11102 TANGNTL BX SKIN SINGLE LES: CPT

## 2023-10-19 PROCEDURE — 99213 OFFICE O/P EST LOW 20 MIN: CPT | Mod: 25

## 2023-10-19 PROCEDURE — 11103 TANGNTL BX SKIN EA SEP/ADDL: CPT | Mod: 59

## 2023-11-06 ENCOUNTER — LABORATORY RESULT (OUTPATIENT)
Age: 58
End: 2023-11-06

## 2023-11-07 ENCOUNTER — OUTPATIENT (OUTPATIENT)
Dept: OUTPATIENT SERVICES | Facility: HOSPITAL | Age: 58
LOS: 1 days | Discharge: HOME | End: 2023-11-07

## 2023-11-07 DIAGNOSIS — D47.2 MONOCLONAL GAMMOPATHY: ICD-10-CM

## 2023-11-07 DIAGNOSIS — D27.1 BENIGN NEOPLASM OF LEFT OVARY: Chronic | ICD-10-CM

## 2023-11-07 DIAGNOSIS — Z98.890 OTHER SPECIFIED POSTPROCEDURAL STATES: Chronic | ICD-10-CM

## 2023-11-07 DIAGNOSIS — Z98.82 BREAST IMPLANT STATUS: Chronic | ICD-10-CM

## 2023-11-07 LAB
T3 SERPL-MCNC: 126 NG/DL
T4 FREE SERPL-MCNC: 1.1 NG/DL
T4 SERPL-MCNC: 7.5 UG/DL
TSH SERPL-ACNC: 1.16 UIU/ML

## 2023-11-08 ENCOUNTER — TRANSCRIPTION ENCOUNTER (OUTPATIENT)
Age: 58
End: 2023-11-08

## 2023-11-09 ENCOUNTER — TRANSCRIPTION ENCOUNTER (OUTPATIENT)
Age: 58
End: 2023-11-09

## 2023-11-09 LAB
ALBUMIN MFR SERPL ELPH: 61.1 %
ALBUMIN SERPL ELPH-MCNC: 4.7 G/DL
ALBUMIN SERPL-MCNC: 4.3 G/DL
ALBUMIN/GLOB SERPL: 1.5 RATIO
ALP BLD-CCNC: 99 U/L
ALPHA1 GLOB MFR SERPL ELPH: 4.1 %
ALPHA1 GLOB SERPL ELPH-MCNC: 0.3 G/DL
ALPHA2 GLOB MFR SERPL ELPH: 9.5 %
ALPHA2 GLOB SERPL ELPH-MCNC: 0.7 G/DL
ALT SERPL-CCNC: 8 U/L
ANION GAP SERPL CALC-SCNC: 16 MMOL/L
AST SERPL-CCNC: 19 U/L
B-GLOBULIN MFR SERPL ELPH: 11.9 %
B-GLOBULIN SERPL ELPH-MCNC: 0.8 G/DL
BILIRUB SERPL-MCNC: 0.3 MG/DL
BUN SERPL-MCNC: 8 MG/DL
CALCIUM SERPL-MCNC: 9.5 MG/DL
CHLORIDE SERPL-SCNC: 103 MMOL/L
CO2 SERPL-SCNC: 24 MMOL/L
CREAT SERPL-MCNC: 0.76 MG/DL
DEPRECATED KAPPA LC FREE/LAMBDA SER: 1.36 RATIO
EGFR: 91 ML/MIN/1.73M2
GAMMA GLOB FLD ELPH-MCNC: 1 G/DL
GAMMA GLOB MFR SERPL ELPH: 13.4 %
IGA SER QL IEP: 361 MG/DL
IGG SER QL IEP: 923 MG/DL
IGM SER QL IEP: 239 MG/DL
INTERPRETATION SERPL IEP-IMP: NORMAL
KAPPA LC CSF-MCNC: 1.24 MG/DL
KAPPA LC SERPL-MCNC: 1.69 MG/DL
M PROTEIN MFR SERPL ELPH: NORMAL
M PROTEIN SPEC IFE-MCNC: NORMAL
MONOCLON BAND OBS SERPL: NORMAL
POTASSIUM SERPL-SCNC: 4.3 MMOL/L
PROT SERPL-MCNC: 7.1 G/DL
SODIUM SERPL-SCNC: 144 MMOL/L

## 2023-11-13 ENCOUNTER — APPOINTMENT (OUTPATIENT)
Dept: HEMATOLOGY ONCOLOGY | Facility: CLINIC | Age: 58
End: 2023-11-13
Payer: COMMERCIAL

## 2023-11-13 VITALS
OXYGEN SATURATION: 96 % | SYSTOLIC BLOOD PRESSURE: 119 MMHG | BODY MASS INDEX: 18.43 KG/M2 | DIASTOLIC BLOOD PRESSURE: 80 MMHG | TEMPERATURE: 98 F | HEART RATE: 75 BPM | WEIGHT: 104 LBS | HEIGHT: 63 IN

## 2023-11-13 PROCEDURE — 99214 OFFICE O/P EST MOD 30 MIN: CPT

## 2023-11-28 ENCOUNTER — APPOINTMENT (OUTPATIENT)
Dept: PLASTIC SURGERY | Facility: CLINIC | Age: 58
End: 2023-11-28

## 2023-11-28 ENCOUNTER — APPOINTMENT (OUTPATIENT)
Dept: DERMATOLOGY | Facility: CLINIC | Age: 58
End: 2023-11-28
Payer: COMMERCIAL

## 2023-11-28 PROCEDURE — 99214 OFFICE O/P EST MOD 30 MIN: CPT

## 2023-11-29 ENCOUNTER — APPOINTMENT (OUTPATIENT)
Dept: ENDOCRINOLOGY | Facility: CLINIC | Age: 58
End: 2023-11-29
Payer: COMMERCIAL

## 2023-11-29 VITALS
WEIGHT: 105 LBS | HEART RATE: 91 BPM | OXYGEN SATURATION: 99 % | SYSTOLIC BLOOD PRESSURE: 112 MMHG | HEIGHT: 63 IN | DIASTOLIC BLOOD PRESSURE: 72 MMHG | BODY MASS INDEX: 18.61 KG/M2

## 2023-11-29 PROCEDURE — 99214 OFFICE O/P EST MOD 30 MIN: CPT

## 2023-12-14 ENCOUNTER — APPOINTMENT (OUTPATIENT)
Dept: DERMATOLOGY | Facility: CLINIC | Age: 58
End: 2023-12-14
Payer: COMMERCIAL

## 2023-12-14 PROCEDURE — 17311 MOHS 1 STAGE H/N/HF/G: CPT

## 2023-12-14 PROCEDURE — 17312 MOHS ADDL STAGE: CPT

## 2023-12-31 PROBLEM — Z86.19 HISTORY OF SEXUALLY TRANSMITTED DISEASE: Status: RESOLVED | Noted: 2021-09-22 | Resolved: 2023-12-31

## 2023-12-31 PROBLEM — Z11.3 ENCOUNTER FOR SCREENING EXAMINATION FOR SEXUALLY TRANSMITTED DISEASE: Status: RESOLVED | Noted: 2021-11-01 | Resolved: 2021-11-15

## 2024-01-04 ENCOUNTER — APPOINTMENT (OUTPATIENT)
Dept: DERMATOLOGY | Facility: CLINIC | Age: 59
End: 2024-01-04
Payer: COMMERCIAL

## 2024-01-04 PROCEDURE — 99213 OFFICE O/P EST LOW 20 MIN: CPT

## 2024-01-05 NOTE — HISTORY OF PRESENT ILLNESS
[FreeTextEntry1] : s/p Mohs surgery for a superficial and infiltrative BCC on the left nasal ala [de-identified] : 11/28/2023 - Mohs surgery consultation for a superficial and infiltrative BCC on the left nasal ala 12/14/2023 - Mohs surgery for a superficial and infiltrative BCC on the left nasal ala 01/04/2024 - Follow-up  Referred by: Dr. Martell  Ms. GABRIELLE GODINEZ is a 58 year old F who presents for follow-up wound check after Mohs surgery consultation for a superficial and infiltrative BCC on the left nasal ala. The lesion was present as a small pink looking pore.  s/p 3 stages of Mohs surgery She had repair with a full thickness skin graft from the left pre-auricular cheek with Dr. Medina in plastic surgery as arranged by Jessica prior to my consultation.  SH: Works as a

## 2024-01-05 NOTE — PHYSICAL EXAM
[Alert] : alert [Oriented x 3] : ~L oriented x 3 [Well Nourished] : well nourished [Conjunctiva Non-injected] : conjunctiva non-injected [No Visual Lymphadenopathy] : no visual  lymphadenopathy [No Clubbing] : no clubbing [No Edema] : no edema [No Bromhidrosis] : no bromhidrosis [No Chromhidrosis] : no chromhidrosis [FreeTextEntry3] : -- Left nasal ala with a well-healing skin graft site, post-op swelling present

## 2024-01-18 LAB
ANION GAP SERPL CALC-SCNC: 14 MMOL/L
APPEARANCE: CLEAR
BILIRUBIN URINE: NEGATIVE
BLOOD URINE: NEGATIVE
BUN SERPL-MCNC: 7 MG/DL
CALCIUM SERPL-MCNC: 9.3 MG/DL
CHLORIDE SERPL-SCNC: 100 MMOL/L
CO2 SERPL-SCNC: 26 MMOL/L
COLOR: YELLOW
CREAT SERPL-MCNC: 0.72 MG/DL
EGFR: 97 ML/MIN/1.73M2
FERRITIN SERPL-MCNC: 52 NG/ML
GLUCOSE QUALITATIVE U: NEGATIVE MG/DL
GLUCOSE SERPL-MCNC: 93 MG/DL
KETONES URINE: NEGATIVE MG/DL
LEUKOCYTE ESTERASE URINE: NEGATIVE
MAGNESIUM SERPL-MCNC: 2 MG/DL
NITRITE URINE: NEGATIVE
PH URINE: 8.5
PHOSPHATE SERPL-MCNC: 3.4 MG/DL
POTASSIUM SERPL-SCNC: 4.2 MMOL/L
PROTEIN URINE: NORMAL MG/DL
SODIUM SERPL-SCNC: 140 MMOL/L
SPECIFIC GRAVITY URINE: 1.01
UROBILINOGEN URINE: 0.2 MG/DL
VIT B12 SERPL-MCNC: 1220 PG/ML

## 2024-01-22 LAB
VIT B1 SERPL-MCNC: 162 NMOL/L
VIT B6 SERPL-MCNC: 14.7 UG/L

## 2024-02-07 ENCOUNTER — APPOINTMENT (OUTPATIENT)
Dept: DERMATOLOGY | Facility: CLINIC | Age: 59
End: 2024-02-07
Payer: COMMERCIAL

## 2024-02-07 PROCEDURE — 17000 DESTRUCT PREMALG LESION: CPT | Mod: 59

## 2024-02-07 PROCEDURE — 99213 OFFICE O/P EST LOW 20 MIN: CPT | Mod: 25

## 2024-02-07 PROCEDURE — 11102 TANGNTL BX SKIN SINGLE LES: CPT | Mod: 59

## 2024-02-07 PROCEDURE — 17110 DESTRUCTION B9 LES UP TO 14: CPT

## 2024-02-12 ENCOUNTER — APPOINTMENT (OUTPATIENT)
Dept: OTOLARYNGOLOGY | Facility: CLINIC | Age: 59
End: 2024-02-12
Payer: COMMERCIAL

## 2024-02-12 VITALS — HEIGHT: 63 IN | WEIGHT: 105 LBS | BODY MASS INDEX: 18.61 KG/M2

## 2024-02-12 PROCEDURE — 99205 OFFICE O/P NEW HI 60 MIN: CPT

## 2024-02-13 NOTE — HISTORY OF PRESENT ILLNESS
[de-identified] : 58 year old female presents for mohs scar revision  s/p 12/14/2023 - Mohs surgery for a superficial and infiltrative BCC on the left nasal ala She had repair with a full thickness skin graft from the left pre-auricular cheek with Dr. Medina in plastic surgery States she cannot breath out of left nostril, cannot flare to bring air in  No recent fevers or infections

## 2024-02-13 NOTE — REASON FOR VISIT
[Initial Evaluation] : an initial evaluation for [FreeTextEntry2] : scarring of left nostril post MOHS recon

## 2024-02-13 NOTE — PHYSICAL EXAM
[FreeTextEntry1] : collapse of left side nasal valve external nasal ala scarring from graft left preauricular incision healing well alar rim asymmetric [Midline] : trachea located in midline position [Normal] : no rashes

## 2024-02-14 ENCOUNTER — APPOINTMENT (OUTPATIENT)
Dept: DERMATOLOGY | Facility: CLINIC | Age: 59
End: 2024-02-14
Payer: COMMERCIAL

## 2024-02-14 PROCEDURE — 99212 OFFICE O/P EST SF 10 MIN: CPT | Mod: 24,25

## 2024-02-14 PROCEDURE — 11102 TANGNTL BX SKIN SINGLE LES: CPT | Mod: 79

## 2024-02-14 PROCEDURE — 11103 TANGNTL BX SKIN EA SEP/ADDL: CPT

## 2024-02-27 ENCOUNTER — APPOINTMENT (OUTPATIENT)
Dept: DERMATOLOGY | Facility: CLINIC | Age: 59
End: 2024-02-27
Payer: COMMERCIAL

## 2024-02-27 PROCEDURE — 99214 OFFICE O/P EST MOD 30 MIN: CPT

## 2024-02-29 NOTE — PHYSICAL EXAM
[Alert] : alert [Oriented x 3] : ~L oriented x 3 [Well Nourished] : well nourished [Conjunctiva Non-injected] : conjunctiva non-injected [No Visual Lymphadenopathy] : no visual  lymphadenopathy [No Clubbing] : no clubbing [No Edema] : no edema [No Bromhidrosis] : no bromhidrosis [No Chromhidrosis] : no chromhidrosis [Hair] : Hair [Scalp] : Scalp [Nose] : Nose [Face] : Face [Eyelids] : Eyelids [Ears] : Ears [Lips] : Lips [Neck] : Neck [R Arm] : R Arm [L Arm] : L Arm [FreeTextEntry3] : -right forearm with 0.6 cm x 0.5 cm pink scar

## 2024-02-29 NOTE — ASSESSMENT
[FreeTextEntry1] : Dermatologic surgery consultation for BCC Right Proximal Dorsal Foream  -- I explained the treatment options of topical immunomodulatory or chemotherapy, electrodessication and curettage, radiation therapy, excision and Mohs surgery.  I recommended excision with 4 mm margins due to the tumor type, location, and size of the skin cancer.  Mohs Appropriate Use Criteria (AUC) Score: 3 (INAPPROPRIATE)  I explained that following extirpation there will be a full thickness defect of the involved area. The reconstructive options will be based on the defect size and surrounding tissue laxity of the involved area and I anticipated linear closure in this case.  -I explained that the patient will need to abstain from physical activity for 1-2 weeks following the surgery, that they would heal with a scar, and also discussed the chances of infection, bleeding, potential sensory or motor nerve damage, and recurrence.   -The patient repeatedly stated that she wanted to have Mohs on this lesion instead of a standard excision due to wanting to confirm clear margins at time of procedure instead of waiting for pathology. -I explained in detail all of the factors contributing to the AUC scoring system to assess whether Mohs surgery is indicated for skin cancer, and the AUC score if this lesion is a 3 (Inappropriate for Mohs) based on the location, small size, primary nature of tumor, lack of aggressive features and no underlying immunosuppression.  -I advised the patient that she is welcome to seek a second opinion from another Mohs surgeon, but that I would recommend standard excision with 4 mm margins for this lesion. -patient became upset and terminated the visit. -I advised that our office is happy to provide contact information for other Mohs surgeons should she wish to seek a second opinion.   Thank you for this dermatologic surgery referral.   Bashir Farfan MD Physician, Dermatology & Dermatologic Surgery [facility]

## 2024-02-29 NOTE — HISTORY OF PRESENT ILLNESS
[FreeTextEntry1] : Early Nodular BCC Right Proximal Dorsal Forearm [de-identified] : Mohs Surgery Consultation  02/27/2024   Referred by: Dr. Martell  We had the pleasure of seeing your patient in consultation for Mohs Micrographic Surgery.  Ms. GABRIELLE GODINEZ is a 58 year old F who presents for evaluation of a recently biopsied BCC on the right proximal dorsal forearm. She was initially concerned that saw a dark spot, and the biopsy showed an early nodular BCC as well as background solar lentigo.  -hx of previous BCC on the left nasal ala s/p Mohs with Dr. Aguiar and repair with Dr. Medina in Plastic Surgery  Pertinent positives noted below  History of HIV or hepatitis: No Blood thinners: none Antibiotic Prophylaxis: None  Medical implants: None  Social History: no tobacco use   The patient's review of systems questionnaire was reviewed. Education needs were identified. There were no barriers to learning.

## 2024-03-04 ENCOUNTER — NON-APPOINTMENT (OUTPATIENT)
Age: 59
End: 2024-03-04

## 2024-03-04 ENCOUNTER — APPOINTMENT (OUTPATIENT)
Dept: OPHTHALMOLOGY | Facility: CLINIC | Age: 59
End: 2024-03-04
Payer: COMMERCIAL

## 2024-03-04 PROCEDURE — 92014 COMPRE OPH EXAM EST PT 1/>: CPT

## 2024-03-06 ENCOUNTER — APPOINTMENT (OUTPATIENT)
Dept: OTOLARYNGOLOGY | Facility: CLINIC | Age: 59
End: 2024-03-06
Payer: COMMERCIAL

## 2024-03-06 VITALS
SYSTOLIC BLOOD PRESSURE: 116 MMHG | WEIGHT: 105 LBS | HEIGHT: 64 IN | DIASTOLIC BLOOD PRESSURE: 60 MMHG | BODY MASS INDEX: 17.93 KG/M2 | HEART RATE: 77 BPM

## 2024-03-06 DIAGNOSIS — Z85.828 PERSONAL HISTORY OF OTHER MALIGNANT NEOPLASM OF SKIN: ICD-10-CM

## 2024-03-06 DIAGNOSIS — E04.2 NONTOXIC MULTINODULAR GOITER: ICD-10-CM

## 2024-03-06 DIAGNOSIS — Z85.850 PERSONAL HISTORY OF MALIGNANT NEOPLASM OF THYROID: ICD-10-CM

## 2024-03-06 PROCEDURE — 99203 OFFICE O/P NEW LOW 30 MIN: CPT

## 2024-03-06 NOTE — HISTORY OF PRESENT ILLNESS
[de-identified] : 58 year old female here for f/u Mohs surgery on L nose for BCCA 12/14/23 with Dr. Aguiar. Pt reports difficulty breathing through left nostril since surgery and states she needs further surgery. Denies nasal congestion, sinus pain and pressure, anterior rhinorrhea, epistaxis, PND.

## 2024-03-06 NOTE — PHYSICAL EXAM
[de-identified] : Anterior neck surgical wound healing well, without keloid formation   [de-identified] : L alar skin graft scar with collapse medially into nasal passage. [Midline] : trachea located in midline position [Normal] : no rashes

## 2024-03-06 NOTE — REASON FOR VISIT
[Subsequent Evaluation] : a subsequent evaluation for [FreeTextEntry2] : s/p Mohs surgery of L alar region.

## 2024-03-06 NOTE — ASSESSMENT
[FreeTextEntry1] : Pt remains SURINDER with respect to the thyroid cancer.     Patients nasal issue will require management by a skilled rhinoplasty surgeon. Pt was advised to f/u with Dr. Shirley.  She requested other options.  She was also given the names of Dr. Espinoza and Dr. Leong.

## 2024-03-06 NOTE — CONSULT LETTER
[Dear  ___] : Dear  [unfilled], [Please see my note below.] : Please see my note below. [Sincerely,] : Sincerely, [Consult Letter:] : I had the pleasure of evaluating your patient, [unfilled]. [Consult Closing:] : Thank you very much for allowing me to participate in the care of this patient.  If you have any questions, please do not hesitate to contact me. [FreeTextEntry3] : Baron Delacruz MD, FACS Chief of Otolaryngology at [facility]  Dept. of Otolaryngology [facility]  [FreeTextEntry2] : Amparo Landin, DO

## 2024-03-07 ENCOUNTER — APPOINTMENT (OUTPATIENT)
Dept: CARDIOLOGY | Facility: CLINIC | Age: 59
End: 2024-03-07
Payer: COMMERCIAL

## 2024-03-07 VITALS
HEART RATE: 91 BPM | WEIGHT: 105 LBS | OXYGEN SATURATION: 98 % | BODY MASS INDEX: 18.02 KG/M2 | SYSTOLIC BLOOD PRESSURE: 110 MMHG | DIASTOLIC BLOOD PRESSURE: 70 MMHG

## 2024-03-07 DIAGNOSIS — E78.5 HYPERLIPIDEMIA, UNSPECIFIED: ICD-10-CM

## 2024-03-07 PROCEDURE — 99204 OFFICE O/P NEW MOD 45 MIN: CPT

## 2024-03-07 PROCEDURE — G2211 COMPLEX E/M VISIT ADD ON: CPT | Mod: NC,1L

## 2024-03-10 ENCOUNTER — NON-APPOINTMENT (OUTPATIENT)
Age: 59
End: 2024-03-10

## 2024-03-11 ENCOUNTER — APPOINTMENT (OUTPATIENT)
Dept: DERMATOLOGY | Facility: CLINIC | Age: 59
End: 2024-03-11
Payer: COMMERCIAL

## 2024-03-11 PROCEDURE — 11103 TANGNTL BX SKIN EA SEP/ADDL: CPT | Mod: 59

## 2024-03-11 PROCEDURE — 99215 OFFICE O/P EST HI 40 MIN: CPT | Mod: 25

## 2024-03-11 PROCEDURE — 17003 DESTRUCT PREMALG LES 2-14: CPT | Mod: 59

## 2024-03-11 PROCEDURE — 17000 DESTRUCT PREMALG LESION: CPT | Mod: 59

## 2024-03-11 PROCEDURE — 11102 TANGNTL BX SKIN SINGLE LES: CPT | Mod: 59

## 2024-03-11 PROCEDURE — 17110 DESTRUCTION B9 LES UP TO 14: CPT

## 2024-03-14 ENCOUNTER — TRANSCRIPTION ENCOUNTER (OUTPATIENT)
Age: 59
End: 2024-03-14

## 2024-03-18 ENCOUNTER — NON-APPOINTMENT (OUTPATIENT)
Age: 59
End: 2024-03-18

## 2024-03-18 ENCOUNTER — TRANSCRIPTION ENCOUNTER (OUTPATIENT)
Age: 59
End: 2024-03-18

## 2024-03-18 ENCOUNTER — APPOINTMENT (OUTPATIENT)
Dept: PLASTIC SURGERY | Facility: CLINIC | Age: 59
End: 2024-03-18

## 2024-03-21 ENCOUNTER — APPOINTMENT (OUTPATIENT)
Dept: DERMATOLOGY | Facility: CLINIC | Age: 59
End: 2024-03-21
Payer: COMMERCIAL

## 2024-03-21 PROCEDURE — 99024 POSTOP FOLLOW-UP VISIT: CPT

## 2024-03-25 PROBLEM — E78.5 HYPERLIPIDEMIA, MILD: Status: ACTIVE | Noted: 2024-03-25

## 2024-03-26 ENCOUNTER — TRANSCRIPTION ENCOUNTER (OUTPATIENT)
Age: 59
End: 2024-03-26

## 2024-03-26 DIAGNOSIS — R92.0 MAMMOGRAPHIC MICROCALCIFICATION FOUND ON DIAGNOSTIC IMAGING OF BREAST: ICD-10-CM

## 2024-03-27 ENCOUNTER — APPOINTMENT (OUTPATIENT)
Dept: PLASTIC SURGERY | Facility: CLINIC | Age: 59
End: 2024-03-27
Payer: COMMERCIAL

## 2024-03-27 ENCOUNTER — NON-APPOINTMENT (OUTPATIENT)
Age: 59
End: 2024-03-27

## 2024-03-27 VITALS
HEIGHT: 64 IN | BODY MASS INDEX: 17.93 KG/M2 | OXYGEN SATURATION: 98 % | TEMPERATURE: 98 F | HEART RATE: 86 BPM | DIASTOLIC BLOOD PRESSURE: 66 MMHG | WEIGHT: 105 LBS | SYSTOLIC BLOOD PRESSURE: 108 MMHG

## 2024-03-27 DIAGNOSIS — Z12.11 ENCOUNTER FOR SCREENING FOR MALIGNANT NEOPLASM OF COLON: ICD-10-CM

## 2024-03-27 DIAGNOSIS — Z01.419 ENCOUNTER FOR GYNECOLOGICAL EXAMINATION (GENERAL) (ROUTINE) W/OUT ABNORMAL FINDINGS: ICD-10-CM

## 2024-03-27 DIAGNOSIS — Z01.818 ENCOUNTER FOR OTHER PREPROCEDURAL EXAMINATION: ICD-10-CM

## 2024-03-27 DIAGNOSIS — Z51.89 ENCOUNTER FOR OTHER SPECIFIED AFTERCARE: ICD-10-CM

## 2024-03-27 PROCEDURE — 99203 OFFICE O/P NEW LOW 30 MIN: CPT

## 2024-03-27 NOTE — ASSESSMENT
[FreeTextEntry1] : Right forearm basal cell carcinoma. We discussed the risks and benefits of direct excision of the lesion. For direct excision, the procedure is quick and the closure can be done right away, however, the pathology report will not be available for about two weeks. At that point, if the margins are positive, the area would need to be re-excised.  There will be a scar, and the scar appearance can be unpredictable. Hopefully, it will be similar to the prior scars on her forearms. I reassured her that these are slow-growing lesions and not likely to spread widely or metastasize. She asked for a referral for evaluation of the left external nasal valve, and I gave her Dr. Sandoval Best's name as he specializes in nasal surgery. I also reassured her that skin grafting in that area (Mohs defect on the nasal ala) was very appropriate, and in many ways better than a local flap or secondary intention healing. I advised her to be patient with the scar maturing process and that it usually takes many months to a year for the thickening in the area to resolve. She was given an opportunity to ask questions and all of her questions were answered. She wishes to proceed.

## 2024-03-27 NOTE — HISTORY OF PRESENT ILLNESS
[FreeTextEntry1] : This is a 60 yo woman with a history of BCCa on the left nasal ala, treated with Mohs, then FTSG from the left preauricular fold on 12/14/23. She complains of external valve collapse and difficulty flaring her nostrils since that procedure. She is expressing a great deal of anxiety over this. She presents now for evaluation for complete excision of a right forearm basal cell carcinoma. She has been watching a small skin lesion for years, and asking her dermatologist (Dr. Martell) to biopsy it.

## 2024-03-27 NOTE — REASON FOR VISIT
[Consultation] : a consultation visit [FreeTextEntry1] : patient states she is here regarding a basal cell.

## 2024-03-27 NOTE — PHYSICAL EXAM
[NI] : Normal [de-identified] : Left nasal ala graft is healed. The external contour is good.  The internal aspect is thickened and protrudes into the nasal airway. Nasal flaring is intact. Muscle function is intact. [de-identified] : right forearm pink biopsy scar noted, about 5-6 mm in diameter Skin is mobile with respect to the underlying subcutaneous tissue. The lesion is on the dorsal surface, on the radial aspect. There is a 2 mm pigmented lesion adjacent to it, on the ulnar aspect.

## 2024-03-28 ENCOUNTER — TRANSCRIPTION ENCOUNTER (OUTPATIENT)
Age: 59
End: 2024-03-28

## 2024-04-01 ENCOUNTER — TRANSCRIPTION ENCOUNTER (OUTPATIENT)
Age: 59
End: 2024-04-01

## 2024-04-01 ENCOUNTER — APPOINTMENT (OUTPATIENT)
Dept: DERMATOLOGY | Facility: CLINIC | Age: 59
End: 2024-04-01
Payer: COMMERCIAL

## 2024-04-01 DIAGNOSIS — Z86.018 PERSONAL HISTORY OF OTHER BENIGN NEOPLASM: ICD-10-CM

## 2024-04-01 PROCEDURE — 99214 OFFICE O/P EST MOD 30 MIN: CPT | Mod: 25

## 2024-04-01 PROCEDURE — 17003 DESTRUCT PREMALG LES 2-14: CPT | Mod: 59

## 2024-04-01 PROCEDURE — 17110 DESTRUCTION B9 LES UP TO 14: CPT | Mod: 59

## 2024-04-01 PROCEDURE — 17260 DSTRJ MAL LES T/A/L 0.5 CM/<: CPT | Mod: 59

## 2024-04-01 PROCEDURE — 17000 DESTRUCT PREMALG LESION: CPT | Mod: 59

## 2024-04-02 ENCOUNTER — TRANSCRIPTION ENCOUNTER (OUTPATIENT)
Age: 59
End: 2024-04-02

## 2024-04-03 ENCOUNTER — TRANSCRIPTION ENCOUNTER (OUTPATIENT)
Age: 59
End: 2024-04-03

## 2024-04-04 ENCOUNTER — TRANSCRIPTION ENCOUNTER (OUTPATIENT)
Age: 59
End: 2024-04-04

## 2024-04-05 ENCOUNTER — TRANSCRIPTION ENCOUNTER (OUTPATIENT)
Age: 59
End: 2024-04-05

## 2024-04-07 ENCOUNTER — NON-APPOINTMENT (OUTPATIENT)
Age: 59
End: 2024-04-07

## 2024-04-08 ENCOUNTER — TRANSCRIPTION ENCOUNTER (OUTPATIENT)
Age: 59
End: 2024-04-08

## 2024-04-09 ENCOUNTER — TRANSCRIPTION ENCOUNTER (OUTPATIENT)
Age: 59
End: 2024-04-09

## 2024-04-12 ENCOUNTER — TRANSCRIPTION ENCOUNTER (OUTPATIENT)
Age: 59
End: 2024-04-12

## 2024-04-15 ENCOUNTER — TRANSCRIPTION ENCOUNTER (OUTPATIENT)
Age: 59
End: 2024-04-15

## 2024-04-17 ENCOUNTER — LABORATORY RESULT (OUTPATIENT)
Age: 59
End: 2024-04-17

## 2024-04-18 ENCOUNTER — APPOINTMENT (OUTPATIENT)
Dept: PLASTIC SURGERY | Facility: CLINIC | Age: 59
End: 2024-04-18
Payer: COMMERCIAL

## 2024-04-18 VITALS
DIASTOLIC BLOOD PRESSURE: 66 MMHG | OXYGEN SATURATION: 98 % | BODY MASS INDEX: 18.1 KG/M2 | HEART RATE: 76 BPM | SYSTOLIC BLOOD PRESSURE: 108 MMHG | HEIGHT: 64 IN | WEIGHT: 106 LBS | TEMPERATURE: 97.8 F

## 2024-04-18 LAB
25(OH)D3 SERPL-MCNC: 55 NG/ML
ALBUMIN SERPL ELPH-MCNC: 4.8 G/DL
ALP BLD-CCNC: 81 U/L
ALT SERPL-CCNC: 10 U/L
AMYLASE/CREAT SERPL: 93 U/L
ANION GAP SERPL CALC-SCNC: 14 MMOL/L
APPEARANCE: ABNORMAL
AST SERPL-CCNC: 19 U/L
BASOPHILS # BLD AUTO: 0.06 K/UL
BASOPHILS NFR BLD AUTO: 1.2 %
BILIRUB SERPL-MCNC: 0.4 MG/DL
BILIRUBIN URINE: NEGATIVE
BLOOD URINE: NEGATIVE
BUN SERPL-MCNC: 9 MG/DL
CALCIUM SERPL-MCNC: 9.5 MG/DL
CHLORIDE SERPL-SCNC: 99 MMOL/L
CHOLEST SERPL-MCNC: 222 MG/DL
CO2 SERPL-SCNC: 27 MMOL/L
COLOR: YELLOW
CORTIS SERPL-MCNC: 9.9 UG/DL
CREAT SERPL-MCNC: 0.76 MG/DL
CRP SERPL-MCNC: <3 MG/L
EGFR: 91 ML/MIN/1.73M2
EOSINOPHIL # BLD AUTO: 0.05 K/UL
EOSINOPHIL NFR BLD AUTO: 1 %
ERYTHROCYTE [SEDIMENTATION RATE] IN BLOOD BY WESTERGREN METHOD: 21 MM/HR
ESTIMATED AVERAGE GLUCOSE: 97 MG/DL
ESTRADIOL SERPL-MCNC: 13 PG/ML
FERRITIN SERPL-MCNC: 33 NG/ML
FOLATE SERPL-MCNC: >20 NG/ML
FSH SERPL-MCNC: 128 IU/L
GLUCOSE QUALITATIVE U: NEGATIVE MG/DL
GLUCOSE SERPL-MCNC: 91 MG/DL
HBA1C MFR BLD HPLC: 5 %
HCT VFR BLD CALC: 41.4 %
HDLC SERPL-MCNC: 89 MG/DL
HGB BLD-MCNC: 13.4 G/DL
IMM GRANULOCYTES NFR BLD AUTO: 0.2 %
INSULIN SERPL-MCNC: 6.6 UU/ML
IRON SATN MFR SERPL: 24 %
IRON SERPL-MCNC: 75 UG/DL
KETONES URINE: NEGATIVE MG/DL
LDLC SERPL CALC-MCNC: 116 MG/DL
LEUKOCYTE ESTERASE URINE: NEGATIVE
LH SERPL-ACNC: 53 IU/L
LPL SERPL-CCNC: 44 U/L
LYMPHOCYTES # BLD AUTO: 1.48 K/UL
LYMPHOCYTES NFR BLD AUTO: 29 %
MAGNESIUM SERPL-MCNC: 2.1 MG/DL
MAN DIFF?: NORMAL
MCHC RBC-ENTMCNC: 29.6 PG
MCHC RBC-ENTMCNC: 32.4 GM/DL
MCV RBC AUTO: 91.4 FL
MONOCYTES # BLD AUTO: 0.28 K/UL
MONOCYTES NFR BLD AUTO: 5.5 %
NEUTROPHILS # BLD AUTO: 3.23 K/UL
NEUTROPHILS NFR BLD AUTO: 63.1 %
NITRITE URINE: NEGATIVE
NONHDLC SERPL-MCNC: 133 MG/DL
PH URINE: 8.5
PLATELET # BLD AUTO: 210 K/UL
POTASSIUM SERPL-SCNC: 4 MMOL/L
PROGEST SERPL-MCNC: 0.2 NG/ML
PROT SERPL-MCNC: 7.3 G/DL
PROTEIN URINE: NEGATIVE MG/DL
RBC # BLD: 4.53 M/UL
RBC # FLD: 12.2 %
SHBG SERPL-SCNC: 126 NMOL/L
SODIUM SERPL-SCNC: 140 MMOL/L
SPECIFIC GRAVITY URINE: 1.01
TESTOST SERPL-MCNC: 28.4 NG/DL
TIBC SERPL-MCNC: 306 UG/DL
TRIGL SERPL-MCNC: 95 MG/DL
UIBC SERPL-MCNC: 231 UG/DL
UROBILINOGEN URINE: 0.2 MG/DL
VIT B12 SERPL-MCNC: 1091 PG/ML
WBC # FLD AUTO: 5.11 K/UL

## 2024-04-18 PROCEDURE — 11401 EXC TR-EXT B9+MARG 0.6-1 CM: CPT

## 2024-04-18 NOTE — PROCEDURE
[Nl] : Local Anesthesia: (1% Lidocaine with 1:100,000 epinephrine) [FreeTextEntry1] : Basal cell carcinoma of right forearm [FreeTextEntry2] : Excision of right forearm basal cell carcinoma, 1cm with margins [FreeTextEntry4] : scant [FreeTextEntry5] : none [FreeTextEntry6] : Following informed consent and marking, the right forearm site was prepped with an alcohol swab and infiltrated with 1% lidocaine with epi. Time was allowed to pass for vasoconstriction.  A 3 cm incision was made with a 15-blade scalpel. This was carried through the dermis into the subcutaneous tissue. Blunt and sharp scissor dissection was used to excise the lesion and a pigmented lesion.  The wound was carefully examined for hemostasis.  The closure was then begun. The subcutaneous tissue was closed using interrupted 3-0 vicryl suture. The skin was then closed with a running intracuticular 4-0 Monocryl suture.  The incision was then dressed with steri-strips, and gaye wrap.  Wound care instructions were given. May shower in two days (Saturday). Leave steri-strips in place.  Follow up in one week. [FreeTextEntry7] : right forearm: short stitch proximal, long stitch radial

## 2024-04-18 NOTE — REASON FOR VISIT
[Procedure: _________] : a [unfilled] procedure visit [FreeTextEntry1] : patient is here for basal cell excision on her right arm.

## 2024-04-19 LAB
BACTERIA UR CULT: NORMAL
TESTOST FREE SERPL-MCNC: 0.6 PG/ML
TESTOST SERPL-MCNC: 28.3 NG/DL

## 2024-04-22 LAB
ALUMINUM SERPL-MCNC: 4 UG/L
B HENSELAE IGG SER-ACNC: NEGATIVE TITER
B HENSELAE IGM SER QL: NEGATIVE TITER
B QUINTANA IGG SER QL: NEGATIVE TITER
B QUINTANA IGM SER QL: NEGATIVE TITER
MERCURY BLD-MCNC: 1.3 UG/L
VIT A SERPL-MCNC: 37.7 UG/DL

## 2024-04-24 ENCOUNTER — APPOINTMENT (OUTPATIENT)
Dept: PLASTIC SURGERY | Facility: CLINIC | Age: 59
End: 2024-04-24
Payer: COMMERCIAL

## 2024-04-24 VITALS
OXYGEN SATURATION: 98 % | HEIGHT: 64 IN | BODY MASS INDEX: 18.27 KG/M2 | DIASTOLIC BLOOD PRESSURE: 62 MMHG | TEMPERATURE: 97.5 F | SYSTOLIC BLOOD PRESSURE: 108 MMHG | WEIGHT: 107 LBS | HEART RATE: 78 BPM

## 2024-04-24 PROCEDURE — 99024 POSTOP FOLLOW-UP VISIT: CPT

## 2024-04-25 ENCOUNTER — NON-APPOINTMENT (OUTPATIENT)
Age: 59
End: 2024-04-25

## 2024-04-25 ENCOUNTER — OUTPATIENT (OUTPATIENT)
Dept: OUTPATIENT SERVICES | Facility: HOSPITAL | Age: 59
LOS: 1 days | Discharge: HOME | End: 2024-04-25

## 2024-04-25 DIAGNOSIS — Z98.82 BREAST IMPLANT STATUS: Chronic | ICD-10-CM

## 2024-04-25 DIAGNOSIS — D47.2 MONOCLONAL GAMMOPATHY: ICD-10-CM

## 2024-04-25 DIAGNOSIS — D27.1 BENIGN NEOPLASM OF LEFT OVARY: Chronic | ICD-10-CM

## 2024-04-25 DIAGNOSIS — Z98.890 OTHER SPECIFIED POSTPROCEDURAL STATES: Chronic | ICD-10-CM

## 2024-04-26 ENCOUNTER — TRANSCRIPTION ENCOUNTER (OUTPATIENT)
Age: 59
End: 2024-04-26

## 2024-04-26 ENCOUNTER — APPOINTMENT (OUTPATIENT)
Dept: HEMATOLOGY ONCOLOGY | Facility: CLINIC | Age: 59
End: 2024-04-26

## 2024-04-26 DIAGNOSIS — D47.2 MONOCLONAL GAMMOPATHY: ICD-10-CM

## 2024-04-29 ENCOUNTER — TRANSCRIPTION ENCOUNTER (OUTPATIENT)
Age: 59
End: 2024-04-29

## 2024-04-29 LAB
DHEA-SULFATE, SERUM: 60 UG/DL
NICOTINAMIDE: 20.7 NG/ML
NICOTINIC ACID: <5 NG/ML

## 2024-05-01 ENCOUNTER — NON-APPOINTMENT (OUTPATIENT)
Age: 59
End: 2024-05-01

## 2024-05-01 LAB — CORE LAB BIOPSY: NORMAL

## 2024-05-02 ENCOUNTER — APPOINTMENT (OUTPATIENT)
Dept: FAMILY MEDICINE | Facility: CLINIC | Age: 59
End: 2024-05-02
Payer: COMMERCIAL

## 2024-05-02 VITALS
HEIGHT: 63.35 IN | HEART RATE: 77 BPM | WEIGHT: 105 LBS | BODY MASS INDEX: 18.38 KG/M2 | OXYGEN SATURATION: 98 % | TEMPERATURE: 98.8 F

## 2024-05-02 VITALS — DIASTOLIC BLOOD PRESSURE: 68 MMHG | SYSTOLIC BLOOD PRESSURE: 112 MMHG

## 2024-05-02 DIAGNOSIS — Z87.898 PERSONAL HISTORY OF OTHER SPECIFIED CONDITIONS: ICD-10-CM

## 2024-05-02 DIAGNOSIS — Z00.00 ENCOUNTER FOR GENERAL ADULT MEDICAL EXAMINATION W/OUT ABNORMAL FINDINGS: ICD-10-CM

## 2024-05-02 DIAGNOSIS — Z85.828 PERSONAL HISTORY OF OTHER MALIGNANT NEOPLASM OF SKIN: ICD-10-CM

## 2024-05-02 DIAGNOSIS — M65.4 RADIAL STYLOID TENOSYNOVITIS [DE QUERVAIN]: ICD-10-CM

## 2024-05-02 DIAGNOSIS — R20.0 ANESTHESIA OF SKIN: ICD-10-CM

## 2024-05-02 DIAGNOSIS — Z87.42 PERSONAL HISTORY OF OTHER DISEASES OF THE FEMALE GENITAL TRACT: ICD-10-CM

## 2024-05-02 DIAGNOSIS — M79.2 NEURALGIA AND NEURITIS, UNSPECIFIED: ICD-10-CM

## 2024-05-02 DIAGNOSIS — Z86.69 PERSONAL HISTORY OF OTHER DISEASES OF THE NERVOUS SYSTEM AND SENSE ORGANS: ICD-10-CM

## 2024-05-02 PROCEDURE — 99396 PREV VISIT EST AGE 40-64: CPT

## 2024-05-02 RX ORDER — VITAMIN A 2400 MCG
250 CAPSULE ORAL DAILY
Qty: 90 | Refills: 0 | Status: COMPLETED | COMMUNITY
Start: 2023-04-27 | End: 2024-05-02

## 2024-05-02 RX ORDER — IBUPROFEN 200 MG
600 CAPSULE ORAL TWICE DAILY
Refills: 0 | Status: COMPLETED | COMMUNITY
End: 2024-05-02

## 2024-05-02 RX ORDER — UBIDECARENONE 100 MG
100 CAPSULE ORAL DAILY
Qty: 90 | Refills: 0 | Status: COMPLETED | COMMUNITY
Start: 2023-04-27 | End: 2024-05-02

## 2024-05-02 RX ORDER — MULTIVIT-MIN/FOLIC/VIT K/LYCOP 400-300MCG
500 TABLET ORAL DAILY
Refills: 0 | Status: ACTIVE | COMMUNITY

## 2024-05-02 RX ORDER — BACILLUS COAGULANS/INULIN 1B-250 MG
CAPSULE ORAL DAILY
Refills: 0 | Status: ACTIVE | COMMUNITY

## 2024-05-02 RX ORDER — PNV NO.95/FERROUS FUM/FOLIC AC 28MG-0.8MG
100 TABLET ORAL DAILY
Refills: 0 | Status: ACTIVE | COMMUNITY
Start: 2020-06-09

## 2024-05-03 ENCOUNTER — TRANSCRIPTION ENCOUNTER (OUTPATIENT)
Age: 59
End: 2024-05-03

## 2024-05-03 NOTE — PLAN
[FreeTextEntry1] : # HCM - vitals stable, PE benign -  breast MRI 7/2023 - normal. Breast US scheduled next week - PAP Smear UTD - Last Bone Density: 8/2021, osteopenia. Pt declines repeat. - Colonoscopy: 8/2023. repeat in 5 years - routine Thyroid US scheduled next week 5/6/24 with hx partial thyroidectomy - Pt will do routine labs this fall

## 2024-05-03 NOTE — HISTORY OF PRESENT ILLNESS
[FreeTextEntry1] : CPE [de-identified] : 60 yo female with PMHx of partial thyroidectomy (left upper lobe removed), MGUS, HLD, presents for annual wellness physical. She has no complaints at this time. In 11/2023 she had basal cell surgery on left nostril. with plastic surgery that caused left nostril to collapse. This has impacted her life. She has plan for repeat plastic surgery this fall. She had right arm BCC (small), which was removed with shave biopsy.  9/27/2023: started supplements. She is increasing calcium intake in diet. Plan for: Thyroid US, breast US, and pelvic US 5/6/24 HCM: Mammogram: breast MRI 7/2023 - normal. Breast US twice per year. PAP Smear: 9/2023. Patient reported PAP Smear was normal. Bone Density: 8/2021. Patient reported bone density results were abnormal. osteopenia. Colonoscopy: 8/2023. repeat in 5 years  Her MGUS is stable, she follows with Heme/Onc She follows with Dr. Doyle with Cardio in 3/2024

## 2024-05-03 NOTE — ASSESSMENT
[FreeTextEntry1] : 58 yo female with PMHx of partial thyroidectomy (left upper lobe removed), MGUS, HLD, presents for annual wellness physical.

## 2024-05-03 NOTE — HEALTH RISK ASSESSMENT
[0] : 2) Feeling down, depressed, or hopeless: Not at all (0) [PHQ-2 Negative - No further assessment needed] : PHQ-2 Negative - No further assessment needed [Patient reported PAP Smear was normal] : Patient reported PAP Smear was normal [Patient reported bone density results were abnormal] : Patient reported bone density results were abnormal [Never] : Never [Very Good] : ~his/her~  mood as very good [No] : In the past 12 months have you used drugs other than those required for medical reasons? No [No falls in past year] : Patient reported no falls in the past year [Patient reported colonoscopy was normal] : Patient reported colonoscopy was normal [None] : None [Employed] : employed [Fully functional (bathing, dressing, toileting, transferring, walking, feeding)] : Fully functional (bathing, dressing, toileting, transferring, walking, feeding) [Fully functional (using the telephone, shopping, preparing meals, housekeeping, doing laundry, using] : Fully functional and needs no help or supervision to perform IADLs (using the telephone, shopping, preparing meals, housekeeping, doing laundry, using transportation, managing medications and managing finances) [Patient/Caregiver not ready to engage] : , patient/caregiver not ready to engage [[id number]] : 0 [Change in mental status noted] : No change in mental status noted [Language] : denies difficulty with language [Behavior] : denies difficulty with behavior [Reasoning] : denies difficulty with reasoning [Seat Belt] :  uses seat belt [PapSmearDate] : 09/23 [BoneDensityDate] : 08/21 [BoneDensityComments] : osteopenia [ColonoscopyDate] : 08/23 [ColonoscopyComments] : repeat in 5 years

## 2024-05-03 NOTE — PHYSICAL EXAM
[No Edema] : there was no peripheral edema [No Focal Deficits] : no focal deficits [Normal Gait] : normal gait [Normal] : affect was normal and insight and judgment were intact [de-identified] : + left nares collapse

## 2024-05-06 ENCOUNTER — TRANSCRIPTION ENCOUNTER (OUTPATIENT)
Age: 59
End: 2024-05-06

## 2024-05-06 NOTE — ADDENDUM
[FreeTextEntry1] : All medical record entries were at my direction and personally dictated by me (Dr. Melanie Hebert). I have reviewed the chart and agree that the record accurately reflects my personal performance of the history, physical exam, assessment and plan.

## 2024-05-06 NOTE — REASON FOR VISIT
[Follow-Up: _____] : a [unfilled] follow-up visit [FreeTextEntry1] : patient states she is doing well and the incision site seems to be healing well.

## 2024-05-06 NOTE — HISTORY OF PRESENT ILLNESS
[FreeTextEntry1] : "I feel great"  Patient presents for follow up of excision of BCC from right forearm one week ago.  She states there is some bruising, but she did not need to take anything for the soreness that she had experienced.  She is awaiting the pathology results.  She has no complaints.

## 2024-05-06 NOTE — PHYSICAL EXAM
[NI] : Normal [de-identified] : Right forearm with steri strips in place. C/D/I Mild ecchymosis surrounding the incision, no edema or oozing, nontender.

## 2024-05-06 NOTE — ASSESSMENT
[FreeTextEntry1] : Healing very well. Pt was given written scar care instructions that were reviewed verbally, as follows:  Scars tend to heal fairly quickly (in 4-14 days depending on the site), then mature slowly over time. The vast majority of improvement occurs in the first 2-3 months, with the pink/red scar becoming softer, more pliable and lighter in color (depending upon skin tone). At 6 months, the scar appears lighter and at approximately one year, the scar reaches maximum improvement (with a range from 9-18 months). Scar care should include skin moisturizer, sunscreen, and if desired, silicone gel.  Silicone gel use should begin after all scabs and crust are gone and the scars are smooth and dry, usually about 3 weeks. The silicone gel should be used twice a day for a treatment course of three months to achieve benefit. Sunscreen and/or makeup can be applied after the silicone gel has been applied and allowed to dry. If necessary, scar revision can be performed after 6 months (even better to wait 9-12 months). This can significantly improve some scars, but requires going through the healing and maturing process again from the beginning.  Pathology report reviewed with pt: no residual carcinoma in the specimen.

## 2024-05-07 ENCOUNTER — NON-APPOINTMENT (OUTPATIENT)
Age: 59
End: 2024-05-07

## 2024-05-09 ENCOUNTER — APPOINTMENT (OUTPATIENT)
Dept: PLASTIC SURGERY | Facility: CLINIC | Age: 59
End: 2024-05-09

## 2024-05-09 VITALS
OXYGEN SATURATION: 99 % | HEART RATE: 72 BPM | BODY MASS INDEX: 18.61 KG/M2 | HEIGHT: 63 IN | DIASTOLIC BLOOD PRESSURE: 72 MMHG | TEMPERATURE: 98 F | SYSTOLIC BLOOD PRESSURE: 109 MMHG | WEIGHT: 105 LBS

## 2024-05-09 DIAGNOSIS — Z09 ENCOUNTER FOR FOLLOW-UP EXAMINATION AFTER COMPLETED TREATMENT FOR CONDITIONS OTHER THAN MALIGNANT NEOPLASM: ICD-10-CM

## 2024-05-09 PROCEDURE — 99212 OFFICE O/P EST SF 10 MIN: CPT

## 2024-05-11 NOTE — HISTORY OF PRESENT ILLNESS
[FreeTextEntry1] : Pt reports that she had seen the two suture ends of the running stitch sticking out, but after she removed the steri-strips, she noticed that one of the suture ends was no longer there. She was very concerned that it had gone inside and was going to cause infection. She also noted a red spot on the suture line.  She states that the scar looks good and is such a fine line that she doesn't want to mess anything up.

## 2024-05-11 NOTE — ASSESSMENT
[FreeTextEntry1] : Doing well post-procedure. Pt was given written scar care instructions that were reviewed verbally, as follows:  Scars tend to heal fairly quickly (in 4-14 days depending on the site), then mature slowly over time. The vast majority of improvement occurs in the first 2-3 months, with the pink/red scar becoming softer, more pliable and lighter in color (depending upon skin tone). At 6 months, the scar appears lighter and at approximately one year, the scar reaches maximum improvement (with a range from 9-18 months). Scar care should include skin moisturizer, sunscreen, and if desired, silicone gel.  Silicone gel use should begin after all scabs and crust are gone and the scars are smooth and dry, usually about 3 weeks. The silicone gel should be used twice a day for a treatment course of three months to achieve benefit. Sunscreen and/or makeup can be applied after the silicone gel has been applied and allowed to dry. If necessary, scar revision can be performed after 6 months (even better to wait 9-12 months). This can significantly improve some scars, but requires going through the healing and maturing process again from the beginning.  I emphasized that she should not be concerned when the scar enters the inflammatory phase, that she should expect the scar to have a more pink/red color and still be a bit raised before it settles down, as noted above. Follow up in 3 months prn. Call for any questions or concerns.

## 2024-05-11 NOTE — REASON FOR VISIT
[Follow-Up: _____] : a [unfilled] follow-up visit [FreeTextEntry1] : Patient her for concerns with her sutures. Patient states they need to be removed.

## 2024-05-11 NOTE — PHYSICAL EXAM
[NI] : Normal [de-identified] : right forearm incision is well healed with on 2 mm area that is more pink, likely a suture granuloma. Remaining suture tail removed. No evidence of infection.

## 2024-05-13 PROBLEM — Z09 SURGICAL FOLLOW-UP CARE: Status: RESOLVED | Noted: 2024-05-06 | Resolved: 2024-05-13

## 2024-05-14 ENCOUNTER — NON-APPOINTMENT (OUTPATIENT)
Age: 59
End: 2024-05-14

## 2024-05-14 ENCOUNTER — TRANSCRIPTION ENCOUNTER (OUTPATIENT)
Age: 59
End: 2024-05-14

## 2024-05-15 ENCOUNTER — TRANSCRIPTION ENCOUNTER (OUTPATIENT)
Age: 59
End: 2024-05-15

## 2024-05-16 ENCOUNTER — TRANSCRIPTION ENCOUNTER (OUTPATIENT)
Age: 59
End: 2024-05-16

## 2024-05-17 ENCOUNTER — TRANSCRIPTION ENCOUNTER (OUTPATIENT)
Age: 59
End: 2024-05-17

## 2024-05-20 NOTE — ASSESSMENT
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    Per patient her PCP felt something in her neck in 4/ 2015, got thyroid ultrasound done, was found to have multiple nodules,2 cm complex cystic, 7 mm and 5 mm complex cystic in right lobe and 3 mm in left lobe , the then had FNA of all the nodules, all were benign , but the 3 mm on left came back atypia , further genetic testing then came back negative.  Then on recent US in 11/21 , all nodules stable, but the left one now is 6 mm with micro calcifications, repeat FNA on 11/9/21 of that nodule came back as atypia of undetermined sig, and thyroseq was positive with BRAF mutation.  The nodule is only 6 mm ,but since the risk of malignancy is very high with BRAF mutation, so she will need surgery.  She then had left superior partial lobectomy on 1/5/22.  Final path showed:   6mm micropapillary carcinomata and a a small 0.3mm micropapillary carcinomata.T1a,N1M1.  No lymph nodes found or submitted.  Postop course was fine, no complications  She had some voice changes, now better.  Energy is fine, weight stable.  Discussed with her that the risks of recurrence is very low is Papillary micro carcinoma, so goal TSH is close to normal.  TSH normal in 12/22, in 4/23 and now in 11/23.  US done in 7/22 shows stable right sided nodules, no concerning features.  then repeat US done in 12/22and now in 11/23, shows stable post left thyroid surgery, stable nodules.  Then repeat thyroid functions and neck US next visit.    will continue to monitor.    rtc in 6  months   .

## 2024-05-20 NOTE — REVIEW OF SYSTEMS
[Decreased Appetite] : appetite not decreased [Recent Weight Gain (___ Lbs)] : no recent weight gain [Recent Weight Loss (___ Lbs)] : no recent weight loss [Visual Field Defect] : no visual field defect [Dry Eyes] : no dryness [Eye Pain] : no pain [Dysphagia] : no dysphagia [Neck Pain] : no neck pain [Dysphonia] : no dysphonia [Chest Pain] : no chest pain [Palpitations] : no palpitations [Lower Ext Edema] : no lower extremity edema [Shortness Of Breath] : no shortness of breath [Cough] : no cough [Orthopnea] : no orthopnea [Nausea] : no nausea [Constipation] : no constipation [Abdominal Pain] : no abdominal pain [Vomiting] : no vomiting [Diarrhea] : no diarrhea [Polyuria] : no polyuria [Dysuria] : no dysuria [Joint Pain] : no joint pain [Muscle Weakness] : no muscle weakness [Myalgia] : no myalgia  [Acanthosis] : no acanthosis  [Acne] : no acne [Dry Skin] : no dry skin [Headaches] : no headaches [Dizziness] : no dizziness [Tremors] : no tremors [Depression] : no depression [Insomnia] : no insomnia [Anxiety] : no anxiety [Polydipsia] : no polydipsia [Cold Intolerance] : no cold intolerance [Heat Intolerance] : no heat intolerance [Easy Bleeding] : no ~M tendency for easy bleeding [Easy Bruising] : no tendency for easy bruising

## 2024-06-25 ENCOUNTER — APPOINTMENT (OUTPATIENT)
Dept: ENDOCRINOLOGY | Facility: CLINIC | Age: 59
End: 2024-06-25

## 2024-08-12 ENCOUNTER — APPOINTMENT (OUTPATIENT)
Dept: DERMATOLOGY | Facility: CLINIC | Age: 59
End: 2024-08-12
Payer: COMMERCIAL

## 2024-08-12 PROCEDURE — 11103 TANGNTL BX SKIN EA SEP/ADDL: CPT | Mod: 59

## 2024-08-12 PROCEDURE — 11102 TANGNTL BX SKIN SINGLE LES: CPT | Mod: 59

## 2024-08-12 PROCEDURE — 17000 DESTRUCT PREMALG LESION: CPT | Mod: 59

## 2024-08-12 PROCEDURE — 17003 DESTRUCT PREMALG LES 2-14: CPT | Mod: 59

## 2024-08-12 PROCEDURE — 17110 DESTRUCTION B9 LES UP TO 14: CPT

## 2024-08-12 PROCEDURE — 99214 OFFICE O/P EST MOD 30 MIN: CPT | Mod: 25

## 2024-08-12 NOTE — ASU PATIENT PROFILE, ADULT - NSICDXPASTSURGICALHX_GEN_ALL_CORE_FT
Caller: Andrew Redmond    Relationship: Self    Best call back number: [phone]     What is the medical concern/diagnosis: EPILEPSY AND ALZHEIMERS    What specialty or service is being requested: NEURO    Any additional details: PATIENT IS WANTING A NEW REFERRAL TO NEUROLOGY. PLEASE CALL.    PAST SURGICAL HISTORY:  Cyst, ovary, dermoid, left 20 y/o    H/O breast augmentation 2007    S/P arthroscopic surgery of right knee 16 y/p

## 2024-08-14 ENCOUNTER — APPOINTMENT (OUTPATIENT)
Dept: PLASTIC SURGERY | Facility: CLINIC | Age: 59
End: 2024-08-14

## 2024-08-16 DIAGNOSIS — N64.4 MASTODYNIA: ICD-10-CM

## 2024-09-05 ENCOUNTER — APPOINTMENT (OUTPATIENT)
Dept: OTOLARYNGOLOGY | Facility: CLINIC | Age: 59
End: 2024-09-05

## 2024-09-30 ENCOUNTER — TRANSCRIPTION ENCOUNTER (OUTPATIENT)
Age: 59
End: 2024-09-30

## 2024-09-30 ENCOUNTER — APPOINTMENT (OUTPATIENT)
Dept: OBGYN | Facility: CLINIC | Age: 59
End: 2024-09-30
Payer: COMMERCIAL

## 2024-09-30 VITALS
BODY MASS INDEX: 18.61 KG/M2 | HEIGHT: 63 IN | SYSTOLIC BLOOD PRESSURE: 114 MMHG | DIASTOLIC BLOOD PRESSURE: 74 MMHG | WEIGHT: 105 LBS | HEART RATE: 83 BPM

## 2024-09-30 DIAGNOSIS — Z13.21 ENCOUNTER FOR SCREENING FOR NUTRITIONAL DISORDER: ICD-10-CM

## 2024-09-30 DIAGNOSIS — E34.9 ENDOCRINE DISORDER, UNSPECIFIED: ICD-10-CM

## 2024-09-30 DIAGNOSIS — Z01.419 ENCOUNTER FOR GYNECOLOGICAL EXAMINATION (GENERAL) (ROUTINE) W/OUT ABNORMAL FINDINGS: ICD-10-CM

## 2024-09-30 PROCEDURE — 99396 PREV VISIT EST AGE 40-64: CPT

## 2024-09-30 PROCEDURE — 36415 COLL VENOUS BLD VENIPUNCTURE: CPT

## 2024-09-30 NOTE — PLAN
[FreeTextEntry1] : 59 year old for an annual.  Health care maintenance -pap -std eval -vaginitis panel -vit D/exercise -breast mammo/sono -colon screening reviewed -f/u PCP for annual and appropriate immunizations -rto 1 year

## 2024-09-30 NOTE — END OF VISIT
[FreeTextEntry3] : I, Estela Madsen, acted as a scribe on behalf of Dr. Sindy Ram D.O. on 09/30/2024.   All medical entries made by the scribe were at my, Dr. Sindy Ram D.O., direction and personally dictated by me on 09/30/2024. I have reviewed the chart and agree that the record accurately reflects my personal performance of the history, physical exam, assessment and plan. I have also personally directed, reviewed, and agreed with the chart.

## 2024-10-01 LAB
APPEARANCE: CLEAR
BACTERIA: NEGATIVE /HPF
BILIRUBIN URINE: NEGATIVE
BLOOD URINE: NEGATIVE
BV BACTERIA RRNA VAG QL NAA+PROBE: NOT DETECTED
C GLABRATA RNA VAG QL NAA+PROBE: NOT DETECTED
C TRACH RRNA SPEC QL NAA+PROBE: NOT DETECTED
CANDIDA RRNA VAG QL PROBE: NOT DETECTED
CAST: 1 /LPF
COLOR: NORMAL
EPITHELIAL CELLS: 0 /HPF
GLUCOSE QUALITATIVE U: NEGATIVE MG/DL
HIV1+2 AB SPEC QL IA.RAPID: NONREACTIVE
HPV HIGH+LOW RISK DNA PNL CVX: NOT DETECTED
KETONES URINE: NEGATIVE MG/DL
LEUKOCYTE ESTERASE URINE: NEGATIVE
MICROSCOPIC-UA: NORMAL
N GONORRHOEA RRNA SPEC QL NAA+PROBE: NOT DETECTED
NITRITE URINE: NEGATIVE
PH URINE: 8
PROTEIN URINE: NEGATIVE MG/DL
RED BLOOD CELLS URINE: 0 /HPF
SPECIFIC GRAVITY URINE: 1.01
T PALLIDUM AB SER QL IA: NEGATIVE
T VAGINALIS RRNA SPEC QL NAA+PROBE: NOT DETECTED
UROBILINOGEN URINE: 0.2 MG/DL
WHITE BLOOD CELLS URINE: 0 /HPF

## 2024-10-02 LAB
BACTERIA UR CULT: NORMAL
HBV SURFACE AG SER QL: NONREACTIVE
HCV AB SER QL: NONREACTIVE
HCV S/CO RATIO: 0.28 S/CO

## 2024-10-03 ENCOUNTER — TRANSCRIPTION ENCOUNTER (OUTPATIENT)
Age: 59
End: 2024-10-03

## 2024-10-03 LAB — FERRITIN SERPL-MCNC: 42 NG/ML

## 2024-10-04 ENCOUNTER — TRANSCRIPTION ENCOUNTER (OUTPATIENT)
Age: 59
End: 2024-10-04

## 2024-10-04 LAB — B-HEM STREP SPEC QL CULT: NORMAL

## 2024-10-07 ENCOUNTER — TRANSCRIPTION ENCOUNTER (OUTPATIENT)
Age: 59
End: 2024-10-07

## 2024-10-08 DIAGNOSIS — D47.2 MONOCLONAL GAMMOPATHY: ICD-10-CM

## 2024-10-09 ENCOUNTER — TRANSCRIPTION ENCOUNTER (OUTPATIENT)
Age: 59
End: 2024-10-09

## 2024-10-11 ENCOUNTER — TRANSCRIPTION ENCOUNTER (OUTPATIENT)
Age: 59
End: 2024-10-11

## 2024-10-16 ENCOUNTER — LABORATORY RESULT (OUTPATIENT)
Age: 59
End: 2024-10-16

## 2024-10-17 ENCOUNTER — TRANSCRIPTION ENCOUNTER (OUTPATIENT)
Age: 59
End: 2024-10-17

## 2024-10-17 LAB
BASOPHILS # BLD AUTO: 0.06 K/UL
BASOPHILS NFR BLD AUTO: 1.1 %
EOSINOPHIL # BLD AUTO: 0.04 K/UL
EOSINOPHIL NFR BLD AUTO: 0.8 %
HCT VFR BLD CALC: 39 %
HGB BLD-MCNC: 12.6 G/DL
IMM GRANULOCYTES NFR BLD AUTO: 0 %
LYMPHOCYTES # BLD AUTO: 1.53 K/UL
LYMPHOCYTES NFR BLD AUTO: 29 %
MAN DIFF?: NORMAL
MCHC RBC-ENTMCNC: 29.3 PG
MCHC RBC-ENTMCNC: 32.3 GM/DL
MCV RBC AUTO: 90.7 FL
MONOCYTES # BLD AUTO: 0.31 K/UL
MONOCYTES NFR BLD AUTO: 5.9 %
NEUTROPHILS # BLD AUTO: 3.33 K/UL
NEUTROPHILS NFR BLD AUTO: 63.2 %
PLATELET # BLD AUTO: 212 K/UL
RBC # BLD: 4.3 M/UL
RBC # FLD: 12.5 %
WBC # FLD AUTO: 5.27 K/UL

## 2024-10-18 ENCOUNTER — TRANSCRIPTION ENCOUNTER (OUTPATIENT)
Age: 59
End: 2024-10-18

## 2024-10-21 ENCOUNTER — OUTPATIENT (OUTPATIENT)
Dept: OUTPATIENT SERVICES | Facility: HOSPITAL | Age: 59
LOS: 1 days | Discharge: HOME | End: 2024-10-21

## 2024-10-21 DIAGNOSIS — Z98.890 OTHER SPECIFIED POSTPROCEDURAL STATES: Chronic | ICD-10-CM

## 2024-10-21 DIAGNOSIS — D47.2 MONOCLONAL GAMMOPATHY: ICD-10-CM

## 2024-10-21 DIAGNOSIS — D27.1 BENIGN NEOPLASM OF LEFT OVARY: Chronic | ICD-10-CM

## 2024-10-21 DIAGNOSIS — Z98.82 BREAST IMPLANT STATUS: Chronic | ICD-10-CM

## 2024-10-28 ENCOUNTER — TRANSCRIPTION ENCOUNTER (OUTPATIENT)
Age: 59
End: 2024-10-28

## 2024-10-28 ENCOUNTER — NON-APPOINTMENT (OUTPATIENT)
Age: 59
End: 2024-10-28

## 2024-10-28 ENCOUNTER — APPOINTMENT (OUTPATIENT)
Dept: HEMATOLOGY ONCOLOGY | Facility: CLINIC | Age: 59
End: 2024-10-28
Payer: COMMERCIAL

## 2024-10-28 VITALS
OXYGEN SATURATION: 98 % | SYSTOLIC BLOOD PRESSURE: 115 MMHG | HEIGHT: 63 IN | DIASTOLIC BLOOD PRESSURE: 75 MMHG | TEMPERATURE: 97.8 F | WEIGHT: 105 LBS | BODY MASS INDEX: 18.61 KG/M2 | HEART RATE: 72 BPM

## 2024-10-28 DIAGNOSIS — D47.2 MONOCLONAL GAMMOPATHY: ICD-10-CM

## 2024-10-28 LAB — MERCURY BLD-MCNC: 2.6 UG/L

## 2024-10-28 PROCEDURE — 99214 OFFICE O/P EST MOD 30 MIN: CPT

## 2024-10-28 PROCEDURE — G2211 COMPLEX E/M VISIT ADD ON: CPT | Mod: NC

## 2024-10-30 ENCOUNTER — APPOINTMENT (OUTPATIENT)
Dept: ENDOCRINOLOGY | Facility: CLINIC | Age: 59
End: 2024-10-30
Payer: COMMERCIAL

## 2024-10-30 VITALS
HEART RATE: 73 BPM | HEIGHT: 63 IN | BODY MASS INDEX: 18.61 KG/M2 | SYSTOLIC BLOOD PRESSURE: 124 MMHG | WEIGHT: 105 LBS | DIASTOLIC BLOOD PRESSURE: 70 MMHG | OXYGEN SATURATION: 98 %

## 2024-10-30 DIAGNOSIS — E04.2 NONTOXIC MULTINODULAR GOITER: ICD-10-CM

## 2024-10-30 PROCEDURE — 99214 OFFICE O/P EST MOD 30 MIN: CPT

## 2024-10-31 ENCOUNTER — TRANSCRIPTION ENCOUNTER (OUTPATIENT)
Age: 59
End: 2024-10-31

## 2024-11-04 ENCOUNTER — TRANSCRIPTION ENCOUNTER (OUTPATIENT)
Age: 59
End: 2024-11-04

## 2024-11-05 ENCOUNTER — TRANSCRIPTION ENCOUNTER (OUTPATIENT)
Age: 59
End: 2024-11-05

## 2024-11-07 ENCOUNTER — TRANSCRIPTION ENCOUNTER (OUTPATIENT)
Age: 59
End: 2024-11-07

## 2024-11-12 DIAGNOSIS — Z98.82 BREAST IMPLANT STATUS: ICD-10-CM

## 2024-11-13 ENCOUNTER — TRANSCRIPTION ENCOUNTER (OUTPATIENT)
Age: 59
End: 2024-11-13

## 2024-11-14 ENCOUNTER — TRANSCRIPTION ENCOUNTER (OUTPATIENT)
Age: 59
End: 2024-11-14

## 2024-11-14 DIAGNOSIS — Z87.42 PERSONAL HISTORY OF OTHER DISEASES OF THE FEMALE GENITAL TRACT: ICD-10-CM

## 2024-11-21 ENCOUNTER — TRANSCRIPTION ENCOUNTER (OUTPATIENT)
Age: 59
End: 2024-11-21

## 2024-12-14 ENCOUNTER — NON-APPOINTMENT (OUTPATIENT)
Age: 59
End: 2024-12-14

## 2025-01-10 ENCOUNTER — TRANSCRIPTION ENCOUNTER (OUTPATIENT)
Age: 60
End: 2025-01-10

## 2025-01-13 ENCOUNTER — TRANSCRIPTION ENCOUNTER (OUTPATIENT)
Age: 60
End: 2025-01-13

## 2025-01-14 ENCOUNTER — TRANSCRIPTION ENCOUNTER (OUTPATIENT)
Age: 60
End: 2025-01-14

## 2025-01-15 ENCOUNTER — APPOINTMENT (OUTPATIENT)
Dept: OTOLARYNGOLOGY | Facility: CLINIC | Age: 60
End: 2025-01-15
Payer: COMMERCIAL

## 2025-01-15 VITALS
BODY MASS INDEX: 18.61 KG/M2 | WEIGHT: 105 LBS | DIASTOLIC BLOOD PRESSURE: 69 MMHG | HEIGHT: 63 IN | HEART RATE: 69 BPM | SYSTOLIC BLOOD PRESSURE: 113 MMHG

## 2025-01-15 VITALS — TEMPERATURE: 98.4 F

## 2025-01-15 DIAGNOSIS — R51.9 HEADACHE, UNSPECIFIED: ICD-10-CM

## 2025-01-15 DIAGNOSIS — J34.89 OTHER SPECIFIED DISORDERS OF NOSE AND NASAL SINUSES: ICD-10-CM

## 2025-01-15 PROCEDURE — 99213 OFFICE O/P EST LOW 20 MIN: CPT

## 2025-01-15 RX ORDER — AMOXICILLIN AND CLAVULANATE POTASSIUM 875; 125 MG/1; MG/1
875-125 TABLET, COATED ORAL TWICE DAILY
Qty: 20 | Refills: 0 | Status: ACTIVE | COMMUNITY
Start: 2025-01-15 | End: 1900-01-01

## 2025-01-16 ENCOUNTER — TRANSCRIPTION ENCOUNTER (OUTPATIENT)
Age: 60
End: 2025-01-16

## 2025-01-29 ENCOUNTER — APPOINTMENT (OUTPATIENT)
Dept: DERMATOLOGY | Facility: CLINIC | Age: 60
End: 2025-01-29
Payer: COMMERCIAL

## 2025-01-29 PROCEDURE — 17110 DESTRUCTION B9 LES UP TO 14: CPT

## 2025-01-29 PROCEDURE — 11102 TANGNTL BX SKIN SINGLE LES: CPT | Mod: 59

## 2025-01-29 PROCEDURE — 99215 OFFICE O/P EST HI 40 MIN: CPT | Mod: 25

## 2025-01-29 PROCEDURE — 11900 INJECT SKIN LESIONS </W 7: CPT | Mod: 59

## 2025-01-29 PROCEDURE — 11103 TANGNTL BX SKIN EA SEP/ADDL: CPT | Mod: 59

## 2025-02-10 NOTE — PHYSICAL EXAM
10-Feb-2025 15:06 [No Acute Distress] : no acute distress [Well Nourished] : well nourished [Well Developed] : well developed [Normal Sclera/Conjunctiva] : normal sclera/conjunctiva [Well-Appearing] : well-appearing [PERRL] : pupils equal round and reactive to light [EOMI] : extraocular movements intact [Normal Outer Ear/Nose] : the outer ears and nose were normal in appearance [Normal Oropharynx] : the oropharynx was normal [No Lymphadenopathy] : no lymphadenopathy [Supple] : supple [Thyroid Normal, No Nodules] : the thyroid was normal and there were no nodules present [No Respiratory Distress] : no respiratory distress  [No Accessory Muscle Use] : no accessory muscle use [Clear to Auscultation] : lungs were clear to auscultation bilaterally [Normal Rate] : normal rate  [Regular Rhythm] : with a regular rhythm [Normal S1, S2] : normal S1 and S2 [No Murmur] : no murmur heard [Pedal Pulses Present] : the pedal pulses are present [No Edema] : there was no peripheral edema [Soft] : abdomen soft [Non-distended] : non-distended [Non Tender] : non-tender [No Masses] : no abdominal mass palpated [No HSM] : no HSM [Normal Bowel Sounds] : normal bowel sounds [Normal Posterior Cervical Nodes] : no posterior cervical lymphadenopathy [Normal Anterior Cervical Nodes] : no anterior cervical lymphadenopathy [No CVA Tenderness] : no CVA  tenderness [No Spinal Tenderness] : no spinal tenderness [No Joint Swelling] : no joint swelling [Grossly Normal Strength/Tone] : grossly normal strength/tone [No Rash] : no rash [No Focal Deficits] : no focal deficits [Normal Gait] : normal gait [Normal Affect] : the affect was normal [Normal Insight/Judgement] : insight and judgment were intact [Normal TMs] : both tympanic membranes were normal [Alert and Oriented x3] : oriented to person, place, and time

## 2025-03-10 ENCOUNTER — NON-APPOINTMENT (OUTPATIENT)
Age: 60
End: 2025-03-10

## 2025-03-10 ENCOUNTER — TRANSCRIPTION ENCOUNTER (OUTPATIENT)
Age: 60
End: 2025-03-10

## 2025-03-10 ENCOUNTER — APPOINTMENT (OUTPATIENT)
Dept: OPHTHALMOLOGY | Facility: CLINIC | Age: 60
End: 2025-03-10
Payer: COMMERCIAL

## 2025-03-10 PROCEDURE — 92014 COMPRE OPH EXAM EST PT 1/>: CPT

## 2025-03-14 ENCOUNTER — TRANSCRIPTION ENCOUNTER (OUTPATIENT)
Age: 60
End: 2025-03-14

## 2025-04-23 ENCOUNTER — LABORATORY RESULT (OUTPATIENT)
Age: 60
End: 2025-04-23

## 2025-04-24 ENCOUNTER — TRANSCRIPTION ENCOUNTER (OUTPATIENT)
Age: 60
End: 2025-04-24

## 2025-04-25 ENCOUNTER — APPOINTMENT (OUTPATIENT)
Dept: OPHTHALMOLOGY | Facility: CLINIC | Age: 60
End: 2025-04-25

## 2025-04-28 ENCOUNTER — TRANSCRIPTION ENCOUNTER (OUTPATIENT)
Age: 60
End: 2025-04-28

## 2025-04-29 ENCOUNTER — TRANSCRIPTION ENCOUNTER (OUTPATIENT)
Age: 60
End: 2025-04-29

## 2025-05-02 ENCOUNTER — APPOINTMENT (OUTPATIENT)
Dept: FAMILY MEDICINE | Facility: CLINIC | Age: 60
End: 2025-05-02
Payer: COMMERCIAL

## 2025-05-02 ENCOUNTER — NON-APPOINTMENT (OUTPATIENT)
Age: 60
End: 2025-05-02

## 2025-05-02 VITALS
DIASTOLIC BLOOD PRESSURE: 74 MMHG | SYSTOLIC BLOOD PRESSURE: 115 MMHG | HEIGHT: 63 IN | BODY MASS INDEX: 18.61 KG/M2 | HEART RATE: 97 BPM | RESPIRATION RATE: 14 BRPM | WEIGHT: 105 LBS

## 2025-05-02 DIAGNOSIS — Z87.898 PERSONAL HISTORY OF OTHER SPECIFIED CONDITIONS: ICD-10-CM

## 2025-05-02 DIAGNOSIS — B96.89 ACUTE VAGINITIS: ICD-10-CM

## 2025-05-02 DIAGNOSIS — Z82.2 FAMILY HISTORY OF DEAFNESS AND HEARING LOSS: ICD-10-CM

## 2025-05-02 DIAGNOSIS — Z85.850 PERSONAL HISTORY OF MALIGNANT NEOPLASM OF THYROID: ICD-10-CM

## 2025-05-02 DIAGNOSIS — Z86.39 PERSONAL HISTORY OF OTHER ENDOCRINE, NUTRITIONAL AND METABOLIC DISEASE: ICD-10-CM

## 2025-05-02 DIAGNOSIS — Z87.2 PERSONAL HISTORY OF DISEASES OF THE SKIN AND SUBCUTANEOUS TISSUE: ICD-10-CM

## 2025-05-02 DIAGNOSIS — R49.0 DYSPHONIA: ICD-10-CM

## 2025-05-02 DIAGNOSIS — Z11.59 ENCOUNTER FOR SCREENING FOR OTHER VIRAL DISEASES: ICD-10-CM

## 2025-05-02 DIAGNOSIS — Z87.19 PERSONAL HISTORY OF OTHER DISEASES OF THE DIGESTIVE SYSTEM: ICD-10-CM

## 2025-05-02 DIAGNOSIS — Z86.018 PERSONAL HISTORY OF OTHER BENIGN NEOPLASM: ICD-10-CM

## 2025-05-02 DIAGNOSIS — K12.0 RECURRENT ORAL APHTHAE: ICD-10-CM

## 2025-05-02 DIAGNOSIS — R92.30 DENSE BREASTS, UNSPECIFIED: ICD-10-CM

## 2025-05-02 DIAGNOSIS — M18.10 UNILATERAL PRIMARY OSTEOARTHRITIS OF FIRST CARPOMETACARPAL JOINT, UNSPECIFIED HAND: ICD-10-CM

## 2025-05-02 DIAGNOSIS — Z20.822 CONTACT WITH AND (SUSPECTED) EXPOSURE TO COVID-19: ICD-10-CM

## 2025-05-02 DIAGNOSIS — Z83.719 FAMILY HISTORY OF COLON POLYPS, UNSPECIFIED: ICD-10-CM

## 2025-05-02 DIAGNOSIS — D89.2 HYPERGAMMAGLOBULINEMIA, UNSPECIFIED: ICD-10-CM

## 2025-05-02 DIAGNOSIS — N63.0 UNSPECIFIED LUMP IN UNSPECIFIED BREAST: ICD-10-CM

## 2025-05-02 DIAGNOSIS — Z85.828 PERSONAL HISTORY OF OTHER MALIGNANT NEOPLASM OF SKIN: ICD-10-CM

## 2025-05-02 DIAGNOSIS — R80.9 PROTEINURIA, UNSPECIFIED: ICD-10-CM

## 2025-05-02 DIAGNOSIS — J34.89 OTHER SPECIFIED DISORDERS OF NOSE AND NASAL SINUSES: ICD-10-CM

## 2025-05-02 DIAGNOSIS — Z87.09 PERSONAL HISTORY OF OTHER DISEASES OF THE RESPIRATORY SYSTEM: ICD-10-CM

## 2025-05-02 DIAGNOSIS — R13.14 DYSPHAGIA, PHARYNGOESOPHAGEAL PHASE: ICD-10-CM

## 2025-05-02 DIAGNOSIS — E04.2 NONTOXIC MULTINODULAR GOITER: ICD-10-CM

## 2025-05-02 DIAGNOSIS — R82.90 UNSPECIFIED ABNORMAL FINDINGS IN URINE: ICD-10-CM

## 2025-05-02 DIAGNOSIS — Z87.39 PERSONAL HISTORY OF OTHER DISEASES OF THE MUSCULOSKELETAL SYSTEM AND CONNECTIVE TISSUE: ICD-10-CM

## 2025-05-02 DIAGNOSIS — L82.0 INFLAMED SEBORRHEIC KERATOSIS: ICD-10-CM

## 2025-05-02 DIAGNOSIS — Z86.69 PERSONAL HISTORY OF OTHER DISEASES OF THE NERVOUS SYSTEM AND SENSE ORGANS: ICD-10-CM

## 2025-05-02 DIAGNOSIS — R92.0 MAMMOGRAPHIC MICROCALCIFICATION FOUND ON DIAGNOSTIC IMAGING OF BREAST: ICD-10-CM

## 2025-05-02 DIAGNOSIS — Z78.9 OTHER SPECIFIED HEALTH STATUS: ICD-10-CM

## 2025-05-02 DIAGNOSIS — H11.442 CONJUNCTIVAL CYSTS, LEFT EYE: ICD-10-CM

## 2025-05-02 DIAGNOSIS — N28.1 CYST OF KIDNEY, ACQUIRED: ICD-10-CM

## 2025-05-02 DIAGNOSIS — Z86.03 PERSONAL HISTORY OF NEOPLASM OF UNCERTAIN BEHAVIOR: ICD-10-CM

## 2025-05-02 DIAGNOSIS — S69.92XD UNSPECIFIED INJURY OF LEFT WRIST, HAND AND FINGER(S), SUBSEQUENT ENCOUNTER: ICD-10-CM

## 2025-05-02 DIAGNOSIS — Z87.42 PERSONAL HISTORY OF OTHER DISEASES OF THE FEMALE GENITAL TRACT: ICD-10-CM

## 2025-05-02 DIAGNOSIS — D47.2 MONOCLONAL GAMMOPATHY: ICD-10-CM

## 2025-05-02 DIAGNOSIS — M85.80 OTHER SPECIFIED DISORDERS OF BONE DENSITY AND STRUCTURE, UNSPECIFIED SITE: ICD-10-CM

## 2025-05-02 DIAGNOSIS — N39.0 URINARY TRACT INFECTION, SITE NOT SPECIFIED: ICD-10-CM

## 2025-05-02 DIAGNOSIS — Z80.8 FAMILY HISTORY OF MALIGNANT NEOPLASM OF OTHER ORGANS OR SYSTEMS: ICD-10-CM

## 2025-05-02 DIAGNOSIS — N76.0 ACUTE VAGINITIS: ICD-10-CM

## 2025-05-02 DIAGNOSIS — G50.1 ATYPICAL FACIAL PAIN: ICD-10-CM

## 2025-05-02 DIAGNOSIS — M65.4 RADIAL STYLOID TENOSYNOVITIS [DE QUERVAIN]: ICD-10-CM

## 2025-05-02 DIAGNOSIS — U07.1 COVID-19: ICD-10-CM

## 2025-05-02 DIAGNOSIS — R52 PAIN, UNSPECIFIED: ICD-10-CM

## 2025-05-02 DIAGNOSIS — E78.5 HYPERLIPIDEMIA, UNSPECIFIED: ICD-10-CM

## 2025-05-02 DIAGNOSIS — Z82.3 FAMILY HISTORY OF STROKE: ICD-10-CM

## 2025-05-02 DIAGNOSIS — R00.2 PALPITATIONS: ICD-10-CM

## 2025-05-02 DIAGNOSIS — K21.9 GASTRO-ESOPHAGEAL REFLUX DISEASE W/OUT ESOPHAGITIS: ICD-10-CM

## 2025-05-02 DIAGNOSIS — K76.89 OTHER SPECIFIED DISEASES OF LIVER: ICD-10-CM

## 2025-05-02 DIAGNOSIS — R20.0 ANESTHESIA OF SKIN: ICD-10-CM

## 2025-05-02 DIAGNOSIS — Z13.21 ENCOUNTER FOR SCREENING FOR NUTRITIONAL DISORDER: ICD-10-CM

## 2025-05-02 DIAGNOSIS — M25.50 PAIN IN UNSPECIFIED JOINT: ICD-10-CM

## 2025-05-02 DIAGNOSIS — Z82.0 FAMILY HISTORY OF EPILEPSY AND OTHER DISEASES OF THE NERVOUS SYSTEM: ICD-10-CM

## 2025-05-02 DIAGNOSIS — Z00.00 ENCOUNTER FOR GENERAL ADULT MEDICAL EXAMINATION W/OUT ABNORMAL FINDINGS: ICD-10-CM

## 2025-05-02 DIAGNOSIS — E34.9 ENDOCRINE DISORDER, UNSPECIFIED: ICD-10-CM

## 2025-05-02 DIAGNOSIS — Z13.31 ENCOUNTER FOR SCREENING FOR DEPRESSION: ICD-10-CM

## 2025-05-02 DIAGNOSIS — R79.89 OTHER SPECIFIED ABNORMAL FINDINGS OF BLOOD CHEMISTRY: ICD-10-CM

## 2025-05-02 DIAGNOSIS — Z98.82 BREAST IMPLANT STATUS: ICD-10-CM

## 2025-05-02 PROCEDURE — 99396 PREV VISIT EST AGE 40-64: CPT | Mod: GC

## 2025-05-05 ENCOUNTER — TRANSCRIPTION ENCOUNTER (OUTPATIENT)
Age: 60
End: 2025-05-05

## 2025-05-06 ENCOUNTER — TRANSCRIPTION ENCOUNTER (OUTPATIENT)
Age: 60
End: 2025-05-06

## 2025-05-12 ENCOUNTER — APPOINTMENT (OUTPATIENT)
Dept: OPHTHALMOLOGY | Facility: CLINIC | Age: 60
End: 2025-05-12
Payer: COMMERCIAL

## 2025-05-12 ENCOUNTER — NON-APPOINTMENT (OUTPATIENT)
Age: 60
End: 2025-05-12

## 2025-05-12 PROCEDURE — 92012 INTRM OPH EXAM EST PATIENT: CPT

## 2025-05-19 ENCOUNTER — TRANSCRIPTION ENCOUNTER (OUTPATIENT)
Age: 60
End: 2025-05-19

## 2025-05-19 ENCOUNTER — NON-APPOINTMENT (OUTPATIENT)
Age: 60
End: 2025-05-19

## 2025-05-19 ENCOUNTER — APPOINTMENT (OUTPATIENT)
Dept: OPHTHALMOLOGY | Facility: CLINIC | Age: 60
End: 2025-05-19
Payer: COMMERCIAL

## 2025-05-19 PROCEDURE — 68115 EXC LES CONJUNCTIVA >1 CM: CPT | Mod: RT

## 2025-05-19 PROCEDURE — 99213 OFFICE O/P EST LOW 20 MIN: CPT | Mod: 25,57

## 2025-05-27 ENCOUNTER — TRANSCRIPTION ENCOUNTER (OUTPATIENT)
Age: 60
End: 2025-05-27

## 2025-05-29 ENCOUNTER — TRANSCRIPTION ENCOUNTER (OUTPATIENT)
Age: 60
End: 2025-05-29

## 2025-05-30 ENCOUNTER — TRANSCRIPTION ENCOUNTER (OUTPATIENT)
Age: 60
End: 2025-05-30

## 2025-06-03 ENCOUNTER — NON-APPOINTMENT (OUTPATIENT)
Age: 60
End: 2025-06-03

## 2025-06-03 ENCOUNTER — APPOINTMENT (OUTPATIENT)
Dept: OPHTHALMOLOGY | Facility: CLINIC | Age: 60
End: 2025-06-03
Payer: COMMERCIAL

## 2025-06-03 PROCEDURE — 92012 INTRM OPH EXAM EST PATIENT: CPT

## 2025-06-20 PROBLEM — R51.9 LEFT FACIAL PAIN: Status: RESOLVED | Noted: 2025-01-15 | Resolved: 2025-06-20

## 2025-06-20 PROBLEM — Z87.42 HISTORY OF BREAST PAIN: Status: RESOLVED | Noted: 2024-08-16 | Resolved: 2025-06-20

## 2025-06-20 PROBLEM — J34.89 SINUS PRESSURE: Status: RESOLVED | Noted: 2025-01-15 | Resolved: 2025-06-20

## 2025-06-20 PROBLEM — E78.5 HYPERLIPIDEMIA, MILD: Status: RESOLVED | Noted: 2024-03-25 | Resolved: 2025-06-20

## 2025-07-17 ENCOUNTER — NON-APPOINTMENT (OUTPATIENT)
Age: 60
End: 2025-07-17

## 2025-07-23 ENCOUNTER — APPOINTMENT (OUTPATIENT)
Dept: PLASTIC SURGERY | Facility: CLINIC | Age: 60
End: 2025-07-23
Payer: COMMERCIAL

## 2025-07-23 VITALS
HEART RATE: 78 BPM | DIASTOLIC BLOOD PRESSURE: 72 MMHG | HEIGHT: 63 IN | OXYGEN SATURATION: 98 % | BODY MASS INDEX: 18.96 KG/M2 | SYSTOLIC BLOOD PRESSURE: 120 MMHG | TEMPERATURE: 98 F | WEIGHT: 107 LBS

## 2025-07-23 DIAGNOSIS — D22.61 MELANOCYTIC NEVI OF RIGHT UPPER LIMB, INCLUDING SHOULDER: ICD-10-CM

## 2025-07-23 DIAGNOSIS — D04.5 CARCINOMA IN SITU OF SKIN OF TRUNK: ICD-10-CM

## 2025-07-23 PROCEDURE — 99213 OFFICE O/P EST LOW 20 MIN: CPT

## 2025-08-04 ENCOUNTER — APPOINTMENT (OUTPATIENT)
Dept: DERMATOLOGY | Facility: CLINIC | Age: 60
End: 2025-08-04
Payer: COMMERCIAL

## 2025-08-04 PROCEDURE — 17003 DESTRUCT PREMALG LES 2-14: CPT | Mod: 59

## 2025-08-04 PROCEDURE — 17000 DESTRUCT PREMALG LESION: CPT | Mod: 59

## 2025-08-04 PROCEDURE — 99215 OFFICE O/P EST HI 40 MIN: CPT | Mod: 25

## 2025-08-04 PROCEDURE — 11105 PUNCH BX SKIN EA SEP/ADDL: CPT | Mod: 59

## 2025-08-04 PROCEDURE — 11104 PUNCH BX SKIN SINGLE LESION: CPT

## 2025-08-06 ENCOUNTER — APPOINTMENT (OUTPATIENT)
Dept: OBGYN | Facility: CLINIC | Age: 60
End: 2025-08-06
Payer: COMMERCIAL

## 2025-08-06 VITALS
BODY MASS INDEX: 18.96 KG/M2 | HEIGHT: 63 IN | DIASTOLIC BLOOD PRESSURE: 63 MMHG | WEIGHT: 107 LBS | SYSTOLIC BLOOD PRESSURE: 128 MMHG

## 2025-08-06 DIAGNOSIS — R10.2 PELVIC AND PERINEAL PAIN: ICD-10-CM

## 2025-08-06 PROCEDURE — 99213 OFFICE O/P EST LOW 20 MIN: CPT

## 2025-08-08 LAB
APPEARANCE: CLEAR
BACTERIA UR CULT: NORMAL
BACTERIA: NEGATIVE /HPF
BILIRUBIN URINE: NEGATIVE
BLOOD URINE: NEGATIVE
CALCIUM OXALATE CRYSTALS: PRESENT
CAST: 0 /LPF
COLOR: YELLOW
EPITHELIAL CELLS: 0 /HPF
GLUCOSE QUALITATIVE U: NEGATIVE MG/DL
KETONES URINE: NEGATIVE MG/DL
LEUKOCYTE ESTERASE URINE: NEGATIVE
MICROSCOPIC-UA: NORMAL
NITRITE URINE: NEGATIVE
PH URINE: 8
PROTEIN URINE: NORMAL MG/DL
RED BLOOD CELLS URINE: 2 /HPF
REVIEW: NORMAL
SPECIFIC GRAVITY URINE: 1.02
UROBILINOGEN URINE: 0.2 MG/DL
WHITE BLOOD CELLS URINE: 0 /HPF

## 2025-08-12 ENCOUNTER — LABORATORY RESULT (OUTPATIENT)
Age: 60
End: 2025-08-12

## 2025-08-12 ENCOUNTER — APPOINTMENT (OUTPATIENT)
Dept: PLASTIC SURGERY | Facility: CLINIC | Age: 60
End: 2025-08-12

## 2025-08-12 VITALS
HEIGHT: 63 IN | TEMPERATURE: 98 F | DIASTOLIC BLOOD PRESSURE: 66 MMHG | WEIGHT: 107 LBS | BODY MASS INDEX: 18.96 KG/M2 | OXYGEN SATURATION: 97 % | HEART RATE: 87 BPM | SYSTOLIC BLOOD PRESSURE: 108 MMHG

## 2025-08-12 PROCEDURE — 11403 EXC TR-EXT B9+MARG 2.1-3CM: CPT

## 2025-08-18 DIAGNOSIS — M18.10 UNILATERAL PRIMARY OSTEOARTHRITIS OF FIRST CARPOMETACARPAL JOINT, UNSPECIFIED HAND: ICD-10-CM

## 2025-08-20 ENCOUNTER — APPOINTMENT (OUTPATIENT)
Dept: VASCULAR SURGERY | Facility: CLINIC | Age: 60
End: 2025-08-20
Payer: COMMERCIAL

## 2025-08-20 ENCOUNTER — APPOINTMENT (OUTPATIENT)
Dept: PLASTIC SURGERY | Facility: CLINIC | Age: 60
End: 2025-08-20
Payer: COMMERCIAL

## 2025-08-20 VITALS
BODY MASS INDEX: 18.96 KG/M2 | TEMPERATURE: 98 F | DIASTOLIC BLOOD PRESSURE: 60 MMHG | SYSTOLIC BLOOD PRESSURE: 116 MMHG | WEIGHT: 107 LBS | HEART RATE: 65 BPM | HEIGHT: 63 IN | OXYGEN SATURATION: 97 %

## 2025-08-20 VITALS — HEIGHT: 63 IN | OXYGEN SATURATION: 98 % | BODY MASS INDEX: 18.95 KG/M2 | RESPIRATION RATE: 16 BRPM | HEART RATE: 69 BPM

## 2025-08-20 DIAGNOSIS — M79.604 PAIN IN RIGHT LEG: ICD-10-CM

## 2025-08-20 PROCEDURE — 99024 POSTOP FOLLOW-UP VISIT: CPT

## 2025-08-20 PROCEDURE — 99203 OFFICE O/P NEW LOW 30 MIN: CPT

## 2025-08-21 DIAGNOSIS — Z09 ENCOUNTER FOR FOLLOW-UP EXAMINATION AFTER COMPLETED TREATMENT FOR CONDITIONS OTHER THAN MALIGNANT NEOPLASM: ICD-10-CM

## 2025-08-21 DIAGNOSIS — Z86.018 PERSONAL HISTORY OF OTHER BENIGN NEOPLASM: ICD-10-CM

## 2025-08-23 LAB — CORE LAB BIOPSY: NORMAL

## 2025-09-04 ENCOUNTER — APPOINTMENT (OUTPATIENT)
Dept: OBGYN | Facility: CLINIC | Age: 60
End: 2025-09-04
Payer: COMMERCIAL

## 2025-09-04 DIAGNOSIS — Z01.419 ENCOUNTER FOR GYNECOLOGICAL EXAMINATION (GENERAL) (ROUTINE) W/OUT ABNORMAL FINDINGS: ICD-10-CM

## 2025-09-04 PROCEDURE — 99459 PELVIC EXAMINATION: CPT

## 2025-09-04 PROCEDURE — 99396 PREV VISIT EST AGE 40-64: CPT

## 2025-09-04 PROCEDURE — 36415 COLL VENOUS BLD VENIPUNCTURE: CPT

## 2025-09-05 LAB
APPEARANCE: CLEAR
BACTERIA: NEGATIVE /HPF
BILIRUBIN URINE: NEGATIVE
BLOOD URINE: NEGATIVE
CAST: 0 /LPF
COLOR: YELLOW
EPITHELIAL CELLS: 0 /HPF
GLUCOSE QUALITATIVE U: NEGATIVE MG/DL
HBV SURFACE AB SER QL: NONREACTIVE
HCV AB SER QL: NONREACTIVE
HCV S/CO RATIO: 0.1 S/CO
HIV1+2 AB SPEC QL IA.RAPID: NONREACTIVE
HPV 16 E6+E7 MRNA CVX QL NAA+PROBE: NOT DETECTED
HPV HIGH+LOW RISK DNA PNL CVX: NOT DETECTED
HPV18+45 E6+E7 MRNA CVX QL NAA+PROBE: NOT DETECTED
KETONES URINE: NEGATIVE MG/DL
LEUKOCYTE ESTERASE URINE: NEGATIVE
MICROSCOPIC-UA: NORMAL
NITRITE URINE: NEGATIVE
PH URINE: 8
PROTEIN URINE: NEGATIVE MG/DL
RED BLOOD CELLS URINE: 0 /HPF
SPECIFIC GRAVITY URINE: 1.01
T PALLIDUM AB SER QL IA: NEGATIVE
UROBILINOGEN URINE: 0.2 MG/DL
WHITE BLOOD CELLS URINE: 0 /HPF

## 2025-09-06 LAB
BV BACTERIA RRNA VAG QL NAA+PROBE: NOT DETECTED
C GLABRATA RNA VAG QL NAA+PROBE: NOT DETECTED
C TRACH RRNA SPEC QL NAA+PROBE: NOT DETECTED
CANDIDA RRNA VAG QL PROBE: NOT DETECTED
GP B STREP DNA SPEC QL NAA+PROBE: NOT DETECTED
N GONORRHOEA RRNA SPEC QL NAA+PROBE: NOT DETECTED
SOURCE GBS: NORMAL
T VAGINALIS RRNA SPEC QL NAA+PROBE: NOT DETECTED

## 2025-09-08 ENCOUNTER — TRANSCRIPTION ENCOUNTER (OUTPATIENT)
Age: 60
End: 2025-09-08

## 2025-09-10 LAB — CYTOLOGY CVX/VAG DOC THIN PREP: ABNORMAL

## 2025-09-11 ENCOUNTER — APPOINTMENT (OUTPATIENT)
Dept: DERMATOLOGY | Facility: CLINIC | Age: 60
End: 2025-09-11
Payer: COMMERCIAL

## 2025-09-11 LAB
MYCOPLASMA HOMINIS CULTURE: NEGATIVE
UREAPLASMA CULTURE: NEGATIVE

## 2025-09-11 PROCEDURE — 99213 OFFICE O/P EST LOW 20 MIN: CPT

## 2025-09-15 ENCOUNTER — TRANSCRIPTION ENCOUNTER (OUTPATIENT)
Age: 60
End: 2025-09-15

## 2025-09-18 ENCOUNTER — TRANSCRIPTION ENCOUNTER (OUTPATIENT)
Age: 60
End: 2025-09-18

## 2025-09-18 DIAGNOSIS — Z01.818 ENCOUNTER FOR OTHER PREPROCEDURAL EXAMINATION: ICD-10-CM

## 2025-09-25 LAB
ALBUMIN SERPL ELPH-MCNC: 4.3 G/DL
ALP BLD-CCNC: 72 U/L
ALT SERPL-CCNC: 15 U/L
ANION GAP SERPL CALC-SCNC: 14 MMOL/L
APTT BLD: 31.1 SEC
AST SERPL-CCNC: 18 U/L
BASOPHILS # BLD AUTO: 0.05 K/UL
BASOPHILS NFR BLD AUTO: 0.9 %
BILIRUB SERPL-MCNC: 0.5 MG/DL
BUN SERPL-MCNC: 10 MG/DL
CALCIUM SERPL-MCNC: 9.3 MG/DL
CHLORIDE SERPL-SCNC: 104 MMOL/L
CO2 SERPL-SCNC: 25 MMOL/L
CREAT SERPL-MCNC: 0.73 MG/DL
EGFRCR SERPLBLD CKD-EPI 2021: 95 ML/MIN/1.73M2
EOSINOPHIL # BLD AUTO: 0.05 K/UL
EOSINOPHIL NFR BLD AUTO: 0.9 %
GLUCOSE SERPL-MCNC: 85 MG/DL
HCT VFR BLD CALC: 42.4 %
HGB BLD-MCNC: 13.6 G/DL
IMM GRANULOCYTES NFR BLD AUTO: 0.2 %
INR PPP: 0.98 RATIO
LYMPHOCYTES # BLD AUTO: 1.51 K/UL
LYMPHOCYTES NFR BLD AUTO: 28.4 %
MAN DIFF?: NORMAL
MCHC RBC-ENTMCNC: 30.1 PG
MCHC RBC-ENTMCNC: 32.1 G/DL
MCV RBC AUTO: 93.8 FL
MONOCYTES # BLD AUTO: 0.39 K/UL
MONOCYTES NFR BLD AUTO: 7.3 %
NEUTROPHILS # BLD AUTO: 3.31 K/UL
NEUTROPHILS NFR BLD AUTO: 62.3 %
PLATELET # BLD AUTO: 231 K/UL
POTASSIUM SERPL-SCNC: 4.1 MMOL/L
PROT SERPL-MCNC: 6.9 G/DL
PT BLD: 11.4 SEC
RBC # BLD: 4.52 M/UL
RBC # FLD: 13.6 %
SODIUM SERPL-SCNC: 142 MMOL/L
WBC # FLD AUTO: 5.32 K/UL

## (undated) DEVICE — SUT CHROMIC 4-0 27" SH

## (undated) DEVICE — LONE START RETRACTOR RING 12MM BLUNT DISP

## (undated) DEVICE — SUT VICRYL 3-0 18" TIES UNDYED

## (undated) DEVICE — POSITIONER STRAP ARMBOARD VELCRO TS-30 12

## (undated) DEVICE — LABEL MED BLANK W PEN

## (undated) DEVICE — PACK HEAD & NECK

## (undated) DEVICE — SUT PROLENE 4-0 18" PS-2

## (undated) DEVICE — DRAPE SPLIT SHEETS 77X120"

## (undated) DEVICE — ELCTR GROUNDING PAD ADULT COVIDIEN

## (undated) DEVICE — CANISTER DISPOSABLE THIN WALL 3000CC

## (undated) DEVICE — LONE STAR ELASTIC STAYS 12MM BLUNT

## (undated) DEVICE — GLV 8 PROTEXIS

## (undated) DEVICE — BLANKET WARMER FULL ADULT

## (undated) DEVICE — DRAPE 3/4 SHEET 52X76"

## (undated) DEVICE — DRAIN JACKSON PRATT 7FR ROUND END NO TROCAR

## (undated) DEVICE — SUT VICRYL 2-0 18" TIES UNDYED

## (undated) DEVICE — DRAIN RESERVOIR FOR JACKSON PRATT 100CC CARDINAL

## (undated) DEVICE — WRAP COMPRESSION CALF MED

## (undated) DEVICE — SUT VICRYL 3-0 27" SH UNDYED

## (undated) DEVICE — SUT CHROMIC 3-0 27" SH

## (undated) DEVICE — GLV 7.5 PROTEXIS

## (undated) DEVICE — Device

## (undated) DEVICE — DRAPE MAGNETIC INSTRUMENT MEDIUM